# Patient Record
Sex: FEMALE | Race: AMERICAN INDIAN OR ALASKA NATIVE | NOT HISPANIC OR LATINO | Employment: OTHER | URBAN - METROPOLITAN AREA
[De-identification: names, ages, dates, MRNs, and addresses within clinical notes are randomized per-mention and may not be internally consistent; named-entity substitution may affect disease eponyms.]

---

## 2017-10-17 ENCOUNTER — HOSPITAL ENCOUNTER (OUTPATIENT)
Dept: RADIOLOGY | Facility: MEDICAL CENTER | Age: 72
End: 2017-10-17
Attending: INTERNAL MEDICINE
Payer: MEDICARE

## 2017-10-17 DIAGNOSIS — N18.30 CHRONIC KIDNEY DISEASE, STAGE III (MODERATE) (HCC): ICD-10-CM

## 2017-10-17 PROCEDURE — 76775 US EXAM ABDO BACK WALL LIM: CPT

## 2018-02-22 ENCOUNTER — HOSPITAL ENCOUNTER (OUTPATIENT)
Dept: RADIOLOGY | Facility: MEDICAL CENTER | Age: 73
End: 2018-02-22
Attending: FAMILY MEDICINE
Payer: MEDICARE

## 2018-02-22 DIAGNOSIS — M25.119: ICD-10-CM

## 2018-02-22 DIAGNOSIS — M54.2 CERVICALGIA: ICD-10-CM

## 2018-02-22 PROCEDURE — 73221 MRI JOINT UPR EXTREM W/O DYE: CPT | Mod: LT

## 2018-02-22 PROCEDURE — 72141 MRI NECK SPINE W/O DYE: CPT

## 2018-05-10 ENCOUNTER — HOSPITAL ENCOUNTER (OUTPATIENT)
Dept: RADIOLOGY | Facility: MEDICAL CENTER | Age: 73
End: 2018-05-10
Attending: FAMILY MEDICINE
Payer: MEDICARE

## 2018-05-10 DIAGNOSIS — R42 DIZZINESS AND GIDDINESS: ICD-10-CM

## 2018-05-10 PROCEDURE — 70551 MRI BRAIN STEM W/O DYE: CPT

## 2018-05-17 ENCOUNTER — PATIENT OUTREACH (OUTPATIENT)
Dept: HEALTH INFORMATION MANAGEMENT | Facility: OTHER | Age: 73
End: 2018-05-17

## 2018-05-17 NOTE — PROGRESS NOTES
Outcome: Patient has Non [facility] PCP    Please transfer to Patient Outreach Team at [phone] when patient returns call.    WebIZ Checked & Epic Updated:  yes    [facility] Verified: yes    Attempt # 1 / Final

## 2018-10-24 ENCOUNTER — PATIENT OUTREACH (OUTPATIENT)
Dept: HEALTH INFORMATION MANAGEMENT | Facility: OTHER | Age: 73
End: 2018-10-24

## 2018-11-07 ENCOUNTER — HOSPITAL ENCOUNTER (OUTPATIENT)
Dept: RADIOLOGY | Facility: MEDICAL CENTER | Age: 73
End: 2018-11-07
Payer: MEDICARE

## 2018-11-07 DIAGNOSIS — Z12.31 VISIT FOR SCREENING MAMMOGRAM: ICD-10-CM

## 2018-11-07 PROCEDURE — 77067 SCR MAMMO BI INCL CAD: CPT

## 2018-12-10 DIAGNOSIS — Z01.812 PRE-OPERATIVE LABORATORY EXAMINATION: ICD-10-CM

## 2018-12-10 DIAGNOSIS — Z01.810 PRE-OPERATIVE CARDIOVASCULAR EXAMINATION: ICD-10-CM

## 2018-12-10 LAB
ANION GAP SERPL CALC-SCNC: 6 MMOL/L (ref 0–11.9)
BUN SERPL-MCNC: 28 MG/DL (ref 8–22)
CALCIUM SERPL-MCNC: 9.4 MG/DL (ref 8.5–10.5)
CHLORIDE SERPL-SCNC: 109 MMOL/L (ref 96–112)
CO2 SERPL-SCNC: 24 MMOL/L (ref 20–33)
CREAT SERPL-MCNC: 1.67 MG/DL (ref 0.5–1.4)
EKG IMPRESSION: NORMAL
ERYTHROCYTE [DISTWIDTH] IN BLOOD BY AUTOMATED COUNT: 43.5 FL (ref 35.9–50)
GLUCOSE SERPL-MCNC: 81 MG/DL (ref 65–99)
HCT VFR BLD AUTO: 37.2 % (ref 37–47)
HGB BLD-MCNC: 12.5 G/DL (ref 12–16)
MCH RBC QN AUTO: 30.9 PG (ref 27–33)
MCHC RBC AUTO-ENTMCNC: 33.6 G/DL (ref 33.6–35)
MCV RBC AUTO: 91.9 FL (ref 81.4–97.8)
PLATELET # BLD AUTO: 160 K/UL (ref 164–446)
PMV BLD AUTO: 10.4 FL (ref 9–12.9)
POTASSIUM SERPL-SCNC: 4.8 MMOL/L (ref 3.6–5.5)
RBC # BLD AUTO: 4.05 M/UL (ref 4.2–5.4)
SODIUM SERPL-SCNC: 139 MMOL/L (ref 135–145)
WBC # BLD AUTO: 6.2 K/UL (ref 4.8–10.8)

## 2018-12-10 PROCEDURE — 80048 BASIC METABOLIC PNL TOTAL CA: CPT

## 2018-12-10 PROCEDURE — 36415 COLL VENOUS BLD VENIPUNCTURE: CPT

## 2018-12-10 PROCEDURE — 93010 ELECTROCARDIOGRAM REPORT: CPT | Performed by: INTERNAL MEDICINE

## 2018-12-10 PROCEDURE — 93005 ELECTROCARDIOGRAM TRACING: CPT

## 2018-12-10 PROCEDURE — 83036 HEMOGLOBIN GLYCOSYLATED A1C: CPT

## 2018-12-10 PROCEDURE — 85027 COMPLETE CBC AUTOMATED: CPT

## 2018-12-10 RX ORDER — FERROUS SULFATE 325(65) MG
162.5 TABLET ORAL DAILY
COMMUNITY

## 2018-12-10 RX ORDER — RANITIDINE 150 MG/1
150 TABLET ORAL 2 TIMES DAILY
COMMUNITY
End: 2022-09-09

## 2018-12-10 RX ORDER — LISINOPRIL 40 MG/1
40 TABLET ORAL EVERY MORNING
Status: ON HOLD | COMMUNITY
End: 2019-01-26

## 2018-12-10 RX ORDER — MECLIZINE HYDROCHLORIDE 25 MG/1
25 TABLET ORAL
COMMUNITY
End: 2019-04-10

## 2018-12-10 RX ORDER — GLIMEPIRIDE 2 MG/1
2 TABLET ORAL EVERY MORNING
Status: ON HOLD | COMMUNITY
End: 2019-01-26

## 2018-12-10 NOTE — PROGRESS NOTES
Outcome: not availabe    Please transfer to Patient Outreach Team at [phone] when patient returns call.    WebIZ Checked & Epic Updated:  no    [facility] Verified: yes    Attempt # 1

## 2018-12-11 ENCOUNTER — HOSPITAL ENCOUNTER (OUTPATIENT)
Facility: MEDICAL CENTER | Age: 73
End: 2018-12-11
Attending: OPHTHALMOLOGY | Admitting: OPHTHALMOLOGY
Payer: MEDICARE

## 2018-12-11 VITALS
TEMPERATURE: 97.2 F | HEIGHT: 65 IN | HEART RATE: 70 BPM | BODY MASS INDEX: 29.2 KG/M2 | SYSTOLIC BLOOD PRESSURE: 145 MMHG | RESPIRATION RATE: 16 BRPM | WEIGHT: 175.27 LBS | OXYGEN SATURATION: 94 % | DIASTOLIC BLOOD PRESSURE: 57 MMHG

## 2018-12-11 LAB
GLUCOSE BLD-MCNC: 105 MG/DL (ref 65–99)
GLUCOSE BLD-MCNC: 74 MG/DL (ref 65–99)
GLUCOSE BLD-MCNC: 85 MG/DL (ref 65–99)

## 2018-12-11 PROCEDURE — 160048 HCHG OR STATISTICAL LEVEL 1-5: Performed by: OPHTHALMOLOGY

## 2018-12-11 PROCEDURE — 501836 HCHG SUTURE EYE: Performed by: OPHTHALMOLOGY

## 2018-12-11 PROCEDURE — A6410 STERILE EYE PAD: HCPCS | Performed by: OPHTHALMOLOGY

## 2018-12-11 PROCEDURE — 700102 HCHG RX REV CODE 250 W/ 637 OVERRIDE(OP)

## 2018-12-11 PROCEDURE — 700111 HCHG RX REV CODE 636 W/ 250 OVERRIDE (IP)

## 2018-12-11 PROCEDURE — 160025 RECOVERY II MINUTES (STATS): Performed by: OPHTHALMOLOGY

## 2018-12-11 PROCEDURE — 500558 HCHG EYE SHIELD W/GARTER (FOX): Performed by: OPHTHALMOLOGY

## 2018-12-11 PROCEDURE — 700101 HCHG RX REV CODE 250

## 2018-12-11 PROCEDURE — A9270 NON-COVERED ITEM OR SERVICE: HCPCS

## 2018-12-11 PROCEDURE — 160035 HCHG PACU - 1ST 60 MINS PHASE I: Performed by: OPHTHALMOLOGY

## 2018-12-11 PROCEDURE — 160002 HCHG RECOVERY MINUTES (STAT): Performed by: OPHTHALMOLOGY

## 2018-12-11 PROCEDURE — 82962 GLUCOSE BLOOD TEST: CPT

## 2018-12-11 PROCEDURE — 160046 HCHG PACU - 1ST 60 MINS PHASE II: Performed by: OPHTHALMOLOGY

## 2018-12-11 PROCEDURE — 160029 HCHG SURGERY MINUTES - 1ST 30 MINS LEVEL 4: Performed by: OPHTHALMOLOGY

## 2018-12-11 PROCEDURE — 160009 HCHG ANES TIME/MIN: Performed by: OPHTHALMOLOGY

## 2018-12-11 PROCEDURE — 160041 HCHG SURGERY MINUTES - EA ADDL 1 MIN LEVEL 4: Performed by: OPHTHALMOLOGY

## 2018-12-11 RX ORDER — BALANCED SALT SOLUTION 6.4; .75; .48; .3; 3.9; 1.7 MG/ML; MG/ML; MG/ML; MG/ML; MG/ML; MG/ML
SOLUTION OPHTHALMIC
Status: DISCONTINUED | OUTPATIENT
Start: 2018-12-11 | End: 2018-12-11 | Stop reason: HOSPADM

## 2018-12-11 RX ORDER — ACETAMINOPHEN 325 MG/1
650 TABLET ORAL
Status: COMPLETED | OUTPATIENT
Start: 2018-12-11 | End: 2018-12-11

## 2018-12-11 RX ORDER — PHENYLEPHRINE HYDROCHLORIDE 25 MG/ML
SOLUTION/ DROPS OPHTHALMIC
Status: COMPLETED
Start: 2018-12-11 | End: 2018-12-11

## 2018-12-11 RX ORDER — PHENYLEPHRINE HYDROCHLORIDE 25 MG/ML
1 SOLUTION/ DROPS OPHTHALMIC
Status: COMPLETED | OUTPATIENT
Start: 2018-12-11 | End: 2018-12-11

## 2018-12-11 RX ORDER — BALANCED SALT SOLUTION ENRICHED WITH BICARBONATE, DEXTROSE, AND GLUTATHIONE
KIT INTRAOCULAR
Status: DISCONTINUED | OUTPATIENT
Start: 2018-12-11 | End: 2018-12-11 | Stop reason: HOSPADM

## 2018-12-11 RX ORDER — FLURBIPROFEN SODIUM 0.3 MG/ML
SOLUTION/ DROPS OPHTHALMIC
Status: COMPLETED
Start: 2018-12-11 | End: 2018-12-11

## 2018-12-11 RX ORDER — CYCLOPENTOLATE HYDROCHLORIDE 10 MG/ML
1 SOLUTION/ DROPS OPHTHALMIC ONCE
Status: COMPLETED | OUTPATIENT
Start: 2018-12-11 | End: 2018-12-11

## 2018-12-11 RX ORDER — ONDANSETRON 2 MG/ML
4 INJECTION INTRAMUSCULAR; INTRAVENOUS
Status: DISCONTINUED | OUTPATIENT
Start: 2018-12-11 | End: 2018-12-11 | Stop reason: HOSPADM

## 2018-12-11 RX ORDER — DEXTROSE MONOHYDRATE 50 MG/ML
INJECTION, SOLUTION INTRAVENOUS CONTINUOUS
Status: DISCONTINUED | OUTPATIENT
Start: 2018-12-11 | End: 2018-12-11 | Stop reason: HOSPADM

## 2018-12-11 RX ORDER — DEXTROSE MONOHYDRATE 50 MG/ML
1000 INJECTION, SOLUTION INTRAVENOUS
Status: COMPLETED | OUTPATIENT
Start: 2018-12-11 | End: 2018-12-11

## 2018-12-11 RX ORDER — MOXIFLOXACIN 5 MG/ML
SOLUTION/ DROPS OPHTHALMIC
Status: COMPLETED
Start: 2018-12-11 | End: 2018-12-11

## 2018-12-11 RX ORDER — SODIUM CHLORIDE, SODIUM LACTATE, POTASSIUM CHLORIDE, CALCIUM CHLORIDE 600; 310; 30; 20 MG/100ML; MG/100ML; MG/100ML; MG/100ML
INJECTION, SOLUTION INTRAVENOUS CONTINUOUS
Status: DISCONTINUED | OUTPATIENT
Start: 2018-12-11 | End: 2018-12-11 | Stop reason: HOSPADM

## 2018-12-11 RX ORDER — SODIUM CHLORIDE, SODIUM LACTATE, POTASSIUM CHLORIDE, CALCIUM CHLORIDE 600; 310; 30; 20 MG/100ML; MG/100ML; MG/100ML; MG/100ML
INJECTION, SOLUTION INTRAVENOUS ONCE
Status: COMPLETED | OUTPATIENT
Start: 2018-12-11 | End: 2018-12-11

## 2018-12-11 RX ORDER — DEXAMETHASONE SODIUM PHOSPHATE 4 MG/ML
INJECTION, SOLUTION INTRA-ARTICULAR; INTRALESIONAL; INTRAMUSCULAR; INTRAVENOUS; SOFT TISSUE
Status: DISCONTINUED
Start: 2018-12-11 | End: 2018-12-11 | Stop reason: HOSPADM

## 2018-12-11 RX ORDER — FLURBIPROFEN SODIUM 0.3 MG/ML
1 SOLUTION/ DROPS OPHTHALMIC ONCE
Status: COMPLETED | OUTPATIENT
Start: 2018-12-11 | End: 2018-12-11

## 2018-12-11 RX ORDER — MOXIFLOXACIN 5 MG/ML
1 SOLUTION/ DROPS OPHTHALMIC ONCE
Status: COMPLETED | OUTPATIENT
Start: 2018-12-11 | End: 2018-12-11

## 2018-12-11 RX ORDER — DEXAMETHASONE SODIUM PHOSPHATE 4 MG/ML
INJECTION, SOLUTION INTRA-ARTICULAR; INTRALESIONAL; INTRAMUSCULAR; INTRAVENOUS; SOFT TISSUE
Status: DISCONTINUED | OUTPATIENT
Start: 2018-12-11 | End: 2018-12-11 | Stop reason: HOSPADM

## 2018-12-11 RX ORDER — TROPICAMIDE 10 MG/ML
SOLUTION/ DROPS OPHTHALMIC
Status: COMPLETED
Start: 2018-12-11 | End: 2018-12-11

## 2018-12-11 RX ORDER — LABETALOL HYDROCHLORIDE 5 MG/ML
5 INJECTION, SOLUTION INTRAVENOUS
Status: DISCONTINUED | OUTPATIENT
Start: 2018-12-11 | End: 2018-12-11 | Stop reason: HOSPADM

## 2018-12-11 RX ORDER — BALANCED SALT SOLUTION ENRICHED WITH BICARBONATE, DEXTROSE, AND GLUTATHIONE
KIT INTRAOCULAR
Status: DISCONTINUED
Start: 2018-12-11 | End: 2018-12-11 | Stop reason: HOSPADM

## 2018-12-11 RX ORDER — TROPICAMIDE 10 MG/ML
1 SOLUTION/ DROPS OPHTHALMIC ONCE
Status: COMPLETED | OUTPATIENT
Start: 2018-12-11 | End: 2018-12-11

## 2018-12-11 RX ORDER — NEOMYCIN SULFATE, POLYMYXIN B SULFATE, AND DEXAMETHASONE 3.5; 10000; 1 MG/G; [USP'U]/G; MG/G
OINTMENT OPHTHALMIC
Status: DISCONTINUED | OUTPATIENT
Start: 2018-12-11 | End: 2018-12-11 | Stop reason: HOSPADM

## 2018-12-11 RX ORDER — CYCLOPENTOLATE HYDROCHLORIDE 10 MG/ML
SOLUTION/ DROPS OPHTHALMIC
Status: COMPLETED
Start: 2018-12-11 | End: 2018-12-11

## 2018-12-11 RX ORDER — DEXMEDETOMIDINE HYDROCHLORIDE 100 UG/ML
INJECTION, SOLUTION INTRAVENOUS
Status: DISCONTINUED
Start: 2018-12-11 | End: 2018-12-11 | Stop reason: HOSPADM

## 2018-12-11 RX ORDER — LIDOCAINE HYDROCHLORIDE 40 MG/ML
SOLUTION TOPICAL
Status: DISCONTINUED
Start: 2018-12-11 | End: 2018-12-11 | Stop reason: HOSPADM

## 2018-12-11 RX ADMIN — CYCLOPENTOLATE HYDROCHLORIDE 1 DROP: 10 SOLUTION/ DROPS OPHTHALMIC at 17:35

## 2018-12-11 RX ADMIN — CYCLOPENTOLATE HYDROCHLORIDE 1 DROP: 10 SOLUTION/ DROPS OPHTHALMIC at 11:30

## 2018-12-11 RX ADMIN — ACETAMINOPHEN 650 MG: 325 TABLET ORAL at 18:54

## 2018-12-11 RX ADMIN — TROPICAMIDE 1 DROP: 10 SOLUTION/ DROPS OPHTHALMIC at 17:36

## 2018-12-11 RX ADMIN — SODIUM CHLORIDE, SODIUM LACTATE, POTASSIUM CHLORIDE, CALCIUM CHLORIDE: 600; 310; 30; 20 INJECTION, SOLUTION INTRAVENOUS at 11:47

## 2018-12-11 RX ADMIN — DEXTROSE MONOHYDRATE 500 ML: 50 INJECTION, SOLUTION INTRAVENOUS at 17:35

## 2018-12-11 RX ADMIN — FLURBIPROFEN SODIUM 1 DROP: 0.3 SOLUTION/ DROPS OPHTHALMIC at 11:30

## 2018-12-11 RX ADMIN — FLURBIPROFEN SODIUM 1 DROP: 0.3 SOLUTION/ DROPS OPHTHALMIC at 17:36

## 2018-12-11 RX ADMIN — TROPICAMIDE 1 DROP: 10 SOLUTION/ DROPS OPHTHALMIC at 11:30

## 2018-12-11 RX ADMIN — MOXIFLOXACIN HYDROCHLORIDE 1 DROP: 5 SOLUTION/ DROPS OPHTHALMIC at 11:30

## 2018-12-11 RX ADMIN — PHENYLEPHRINE HYDROCHLORIDE 1 DROP: 25 SOLUTION/ DROPS OPHTHALMIC at 17:36

## 2018-12-11 RX ADMIN — MOXIFLOXACIN 1 DROP: 5 SOLUTION/ DROPS OPHTHALMIC at 17:36

## 2018-12-11 RX ADMIN — PHENYLEPHRINE HYDROCHLORIDE 1 DROP: 2.5 SOLUTION/ DROPS OPHTHALMIC at 11:30

## 2018-12-11 ASSESSMENT — PAIN SCALES - GENERAL
PAINLEVEL_OUTOF10: 0
PAINLEVEL_OUTOF10: 2
PAINLEVEL_OUTOF10: 0

## 2018-12-12 LAB
EST. AVERAGE GLUCOSE BLD GHB EST-MCNC: 200 MG/DL
HBA1C MFR BLD: 8.6 % (ref 0–5.6)

## 2018-12-12 NOTE — DISCHARGE INSTRUCTIONS
ACTIVITY: Rest and take it easy for the first 24 hours.  A responsible adult is recommended to remain with you during that time.  It is normal to feel sleepy.  We encourage you to not do anything that requires balance, judgment or coordination.    MILD FLU-LIKE SYMPTOMS ARE NORMAL. YOU MAY EXPERIENCE GENERALIZED MUSCLE ACHES, THROAT IRRITATION, HEADACHE AND/OR SOME NAUSEA.    FOR 24 HOURS DO NOT:  Drive, operate machinery or run household appliances.  Drink beer or alcoholic beverages.   Make important decisions or sign legal documents.    SPECIAL INSTRUCTIONS: Follow up with Dr. Bolaños tomorrow as scheduled. Call for worsening pain, fevers, discolored drainage.     DIET: To avoid nausea, slowly advance diet as tolerated, avoiding spicy or greasy foods for the first day.  Add more substantial food to your diet according to your physician's instructions.  Babies can be fed formula or breast milk as soon as they are hungry.  INCREASE FLUIDS AND FIBER TO AVOID CONSTIPATION.    SURGICAL DRESSING/BATHING: Ok to bathe, keep eye dressing clean and dry.    FOLLOW-UP APPOINTMENT:  A follow-up appointment should be arranged with your doctor tomorrow; call to schedule.    You should CALL YOUR PHYSICIAN if you develop:  Fever greater than 101 degrees F.  Pain not relieved by medication, or persistent nausea or vomiting.  Excessive bleeding (blood soaking through dressing) or unexpected drainage from the wound.  Extreme redness or swelling around the incision site, drainage of pus or foul smelling drainage.  Inability to urinate or empty your bladder within 8 hours.  Problems with breathing or chest pain.    You should call 911 if you develop problems with breathing or chest pain.  If you are unable to contact your doctor or surgical center, you should go to the nearest emergency room or urgent care center.  Physician's telephone #: Dr. Bolaños [phone]    If any questions arise, call your doctor.  If your doctor is not  available, please feel free to call the Surgical Center at [phone].  The Center is open Monday through Friday from 7AM to 7PM.  You can also call the HEALTH HOTLINE open 24 hours/day, 7 days/week and speak to a nurse at [phone], or toll free at [phone].    A registered nurse may call you a few days after your surgery to see how you are doing after your procedure.    MEDICATIONS: Resume taking daily medication.  Take prescribed pain medication with food.  If no medication is prescribed, you may take non-aspirin pain medication if needed.  PAIN MEDICATION CAN BE VERY CONSTIPATING.  Take a stool softener or laxative such as senokot, pericolace, or milk of magnesia if needed.     Last pain medication given at 6:55 pm (Tylenol).    If your physician has prescribed pain medication that includes Acetaminophen (Tylenol), do not take additional Acetaminophen (Tylenol) while taking the prescribed medication.    Depression / Suicide Risk    As you are discharged from this [facility] facility, it is important to learn how to keep safe from harming yourself.    Recognize the warning signs:  · Abrupt changes in personality, positive or negative- including increase in energy   · Giving away possessions  · Change in eating patterns- significant weight changes-  positive or negative  · Change in sleeping patterns- unable to sleep or sleeping all the time   · Unwillingness or inability to communicate  · Depression  · Unusual sadness, discouragement and loneliness  · Talk of wanting to die  · Neglect of personal appearance   · Rebelliousness- reckless behavior  · Withdrawal from people/activities they love  · Confusion- inability to concentrate     If you or a loved one observes any of these behaviors or has concerns about self-harm, here's what you can do:  · Talk about it- your feelings and reasons for harming yourself  · Remove any means that you might use to hurt yourself (examples: pills, rope, extension  cords, firearm)  · Get professional help from the community (Mental Health, Substance Abuse, psychological counseling)  · Do not be alone:Call your Safe Contact- someone whom you trust who will be there for you.  · Call your local CRISIS HOTLINE 601-7963 or 938-030-3738  · Call your local Children's Mobile Crisis Response Team [place] (053) 922-8122 or www.[employer]  · Call the toll free National Suicide Prevention Hotlines   · National Suicide Prevention Lifeline 296-070-JMHM (3778)  · National Hope Line Network 800-SUICIDE (880-7860)

## 2018-12-12 NOTE — OR NURSING
1830- Pt to PACU from OR. Report from anesthesia and OR RN. Eye shield in place, CDI. Respirations even and unlabored. VSS. FSBG 85, D5W infusing.

## 2018-12-12 NOTE — OR NURSING
1905- Medicated per MAR for 2/10 R eye pain. Sitting up in bed, tolerating orals, DC'd D5W per anesthesia order. Daughter at bs. No other needs at this time.     1945- Discharge orders received. Meets discharge criteria at this time. 2/10 surgical pain. No nausea. Tolerating PO. On RA. All lines and monitors discontinued. Reviewed discharge paperwork with pt and daugther. Discussed diet, activity, medications, follow up care and worsening symptoms. No questions at this time. Pt to be discharged to home via private vehicle. Escorted out to car in [place] with RN and daughter.

## 2018-12-12 NOTE — OR NURSING
1659 Pt's FSBG rechecked and 74. Dr. Carpenter notified. Order for Dextrose 5% at 30ml/ hr and started in preop, see MAR. Additional set of eye drops ordered by Surgeon, see MAR for administration. Report to MARINA Batres RN. Pt taken back to OR.

## 2018-12-17 NOTE — OP REPORT
DATE OF SERVICE:  12/11/2018    PREOPERATIVE DIAGNOSES:  1.  Nonclearing vitreous hemorrhage, right eye.  2.  Proliferative diabetic retinopathy, right eye.    POSTOPERATIVE DIAGNOSES:  1.  Nonclearing vitreous hemorrhage, right eye.  2.  Proliferative diabetic retinopathy, right eye.    PROCEDURE:  A 23-gauge pars plana vitrectomy, Endolaser, right eye.    SURGEON:  Katina Bolaños MD    ASSISTANT:  None.    INDICATIONS:  Patient with decreased vision due to vitreous hemorrhage.    Risks, benefits, and alternatives of surgery were discussed with the patient.    Patient consented for the procedure.    DETAILS OF THE PROCEDURE:  The correct eye was marked in the preop area.  The   patient was taken to the operating room.  General anesthesia was induced by   anesthesia.  Patient was prepped and draped in the usual sterile ophthalmic   fashion.  Site was marked 3 mm from the limbus in the inferotemporal quadrant.    A 23-gauge trocar was used in a beveled fashion to enter the vitreous   cavity.  Infusion was placed in the eye, visualized with the light pipe and   then turned on.  One site superonasally and another site superotemporally were   then marked 3 mm from the limbus.  A 23-gauge trocar was used in a beveled   fashion to enter the vitreous cavity.  Infusion was placed in the eye with a   light pipe and vitrector were inserted inside the eye.  We performed good core   and peripheral vitrectomy to remove the vitreous hemorrhage.  We noticed old   laser, but there were room for more laser, so we performed Endolaser.  Scleral   depression was then performed.  There were no iatrogenic breaks or tears.    The trocars were then removed.  Sclerotomy site was sutured with 7-0 Vicryl   suture.  Postop Ancef and dexamethasone were injected subconjunctivally.  The   drapes were then removed.  The patient's face was cleaned, ointment applied to   the eye.  Eye was patched and shielded.  The patient was taken to the    recovery room in good condition.  There were no complications.       ____________________________________     MD LONDON MARQUES / ADAM    DD:  12/16/2018 22:10:41  DT:  12/17/2018 00:50:42    D#:  [id number]  Job#:  [id number]

## 2019-01-02 ENCOUNTER — HOSPITAL ENCOUNTER (OUTPATIENT)
Facility: MEDICAL CENTER | Age: 74
End: 2019-01-02
Payer: MEDICARE

## 2019-01-02 PROCEDURE — 82274 ASSAY TEST FOR BLOOD FECAL: CPT

## 2019-01-09 ENCOUNTER — HOSPITAL ENCOUNTER (OUTPATIENT)
Dept: LAB | Facility: MEDICAL CENTER | Age: 74
End: 2019-01-09
Attending: PHYSICIAN ASSISTANT
Payer: MEDICARE

## 2019-01-09 PROCEDURE — 87186 SC STD MICRODIL/AGAR DIL: CPT

## 2019-01-09 PROCEDURE — 87077 CULTURE AEROBIC IDENTIFY: CPT

## 2019-01-09 PROCEDURE — 87086 URINE CULTURE/COLONY COUNT: CPT

## 2019-01-10 LAB
AMBIGUOUS DTTM AMBI4: NORMAL
SIGNIFICANT IND 70042: NORMAL
SITE SITE: NORMAL
SOURCE SOURCE: NORMAL

## 2019-01-11 LAB — HEMOCCULT STL QL IA: NEGATIVE

## 2019-01-12 LAB
BACTERIA UR CULT: ABNORMAL
BACTERIA UR CULT: ABNORMAL
SIGNIFICANT IND 70042: ABNORMAL
SITE SITE: ABNORMAL
SOURCE SOURCE: ABNORMAL

## 2019-01-22 ENCOUNTER — APPOINTMENT (OUTPATIENT)
Dept: RADIOLOGY | Facility: MEDICAL CENTER | Age: 74
DRG: 683 | End: 2019-01-22
Attending: EMERGENCY MEDICINE
Payer: MEDICARE

## 2019-01-22 ENCOUNTER — HOSPITAL ENCOUNTER (INPATIENT)
Facility: MEDICAL CENTER | Age: 74
LOS: 4 days | DRG: 683 | End: 2019-01-26
Attending: EMERGENCY MEDICINE | Admitting: INTERNAL MEDICINE
Payer: MEDICARE

## 2019-01-22 DIAGNOSIS — R10.9 FLANK PAIN: ICD-10-CM

## 2019-01-22 DIAGNOSIS — N17.9 AKI (ACUTE KIDNEY INJURY) (HCC): ICD-10-CM

## 2019-01-22 DIAGNOSIS — E87.5 HYPERKALEMIA: ICD-10-CM

## 2019-01-22 LAB
ALBUMIN SERPL BCP-MCNC: 4.1 G/DL (ref 3.2–4.9)
ALBUMIN/GLOB SERPL: 1.4 G/DL
ALP SERPL-CCNC: 93 U/L (ref 30–99)
ALT SERPL-CCNC: 9 U/L (ref 2–50)
ANION GAP SERPL CALC-SCNC: 6 MMOL/L (ref 0–11.9)
APPEARANCE UR: CLEAR
AST SERPL-CCNC: 24 U/L (ref 12–45)
BACTERIA #/AREA URNS HPF: NEGATIVE /HPF
BASOPHILS # BLD AUTO: 0.8 % (ref 0–1.8)
BASOPHILS # BLD: 0.05 K/UL (ref 0–0.12)
BILIRUB SERPL-MCNC: 0.3 MG/DL (ref 0.1–1.5)
BILIRUB UR QL STRIP.AUTO: NEGATIVE
BUN SERPL-MCNC: 30 MG/DL (ref 8–22)
CALCIUM SERPL-MCNC: 9.3 MG/DL (ref 8.5–10.5)
CHLORIDE SERPL-SCNC: 109 MMOL/L (ref 96–112)
CO2 SERPL-SCNC: 18 MMOL/L (ref 20–33)
COLOR UR: YELLOW
CREAT SERPL-MCNC: 2.09 MG/DL (ref 0.5–1.4)
EOSINOPHIL # BLD AUTO: 0.18 K/UL (ref 0–0.51)
EOSINOPHIL NFR BLD: 2.9 % (ref 0–6.9)
EPI CELLS #/AREA URNS HPF: NEGATIVE /HPF
ERYTHROCYTE [DISTWIDTH] IN BLOOD BY AUTOMATED COUNT: 43.8 FL (ref 35.9–50)
GLOBULIN SER CALC-MCNC: 2.9 G/DL (ref 1.9–3.5)
GLUCOSE SERPL-MCNC: 103 MG/DL (ref 65–99)
GLUCOSE UR STRIP.AUTO-MCNC: NEGATIVE MG/DL
HCT VFR BLD AUTO: 33.5 % (ref 37–47)
HGB BLD-MCNC: 11.2 G/DL (ref 12–16)
HYALINE CASTS #/AREA URNS LPF: ABNORMAL /LPF
IMM GRANULOCYTES # BLD AUTO: 0.01 K/UL (ref 0–0.11)
IMM GRANULOCYTES NFR BLD AUTO: 0.2 % (ref 0–0.9)
KETONES UR STRIP.AUTO-MCNC: NEGATIVE MG/DL
LEUKOCYTE ESTERASE UR QL STRIP.AUTO: ABNORMAL
LIPASE SERPL-CCNC: 120 U/L (ref 11–82)
LYMPHOCYTES # BLD AUTO: 2.11 K/UL (ref 1–4.8)
LYMPHOCYTES NFR BLD: 33.9 % (ref 22–41)
MCH RBC QN AUTO: 31.5 PG (ref 27–33)
MCHC RBC AUTO-ENTMCNC: 33.4 G/DL (ref 33.6–35)
MCV RBC AUTO: 94.1 FL (ref 81.4–97.8)
MICRO URNS: ABNORMAL
MONOCYTES # BLD AUTO: 0.46 K/UL (ref 0–0.85)
MONOCYTES NFR BLD AUTO: 7.4 % (ref 0–13.4)
NEUTROPHILS # BLD AUTO: 3.41 K/UL (ref 2–7.15)
NEUTROPHILS NFR BLD: 54.8 % (ref 44–72)
NITRITE UR QL STRIP.AUTO: NEGATIVE
NRBC # BLD AUTO: 0 K/UL
NRBC BLD-RTO: 0 /100 WBC
PH UR STRIP.AUTO: 5 [PH]
PLATELET # BLD AUTO: 163 K/UL (ref 164–446)
PMV BLD AUTO: 10.2 FL (ref 9–12.9)
POTASSIUM SERPL-SCNC: 6.1 MMOL/L (ref 3.6–5.5)
PROT SERPL-MCNC: 7 G/DL (ref 6–8.2)
PROT UR QL STRIP: NEGATIVE MG/DL
RBC # BLD AUTO: 3.56 M/UL (ref 4.2–5.4)
RBC # URNS HPF: ABNORMAL /HPF
RBC UR QL AUTO: NEGATIVE
SODIUM SERPL-SCNC: 133 MMOL/L (ref 135–145)
SP GR UR STRIP.AUTO: 1.01
UROBILINOGEN UR STRIP.AUTO-MCNC: 0.2 MG/DL
WBC # BLD AUTO: 6.2 K/UL (ref 4.8–10.8)
WBC #/AREA URNS HPF: ABNORMAL /HPF

## 2019-01-22 PROCEDURE — 83036 HEMOGLOBIN GLYCOSYLATED A1C: CPT

## 2019-01-22 PROCEDURE — 700111 HCHG RX REV CODE 636 W/ 250 OVERRIDE (IP): Performed by: EMERGENCY MEDICINE

## 2019-01-22 PROCEDURE — 93005 ELECTROCARDIOGRAM TRACING: CPT | Performed by: EMERGENCY MEDICINE

## 2019-01-22 PROCEDURE — 83690 ASSAY OF LIPASE: CPT

## 2019-01-22 PROCEDURE — 85025 COMPLETE CBC W/AUTO DIFF WBC: CPT

## 2019-01-22 PROCEDURE — 99223 1ST HOSP IP/OBS HIGH 75: CPT | Performed by: INTERNAL MEDICINE

## 2019-01-22 PROCEDURE — 80053 COMPREHEN METABOLIC PANEL: CPT

## 2019-01-22 PROCEDURE — 770020 HCHG ROOM/CARE - TELE (206)

## 2019-01-22 PROCEDURE — 74176 CT ABD & PELVIS W/O CONTRAST: CPT

## 2019-01-22 PROCEDURE — 700105 HCHG RX REV CODE 258: Performed by: EMERGENCY MEDICINE

## 2019-01-22 PROCEDURE — 96361 HYDRATE IV INFUSION ADD-ON: CPT

## 2019-01-22 PROCEDURE — 99285 EMERGENCY DEPT VISIT HI MDM: CPT

## 2019-01-22 PROCEDURE — 81001 URINALYSIS AUTO W/SCOPE: CPT

## 2019-01-22 PROCEDURE — 96375 TX/PRO/DX INJ NEW DRUG ADDON: CPT

## 2019-01-22 PROCEDURE — 36415 COLL VENOUS BLD VENIPUNCTURE: CPT

## 2019-01-22 RX ORDER — MORPHINE SULFATE 4 MG/ML
4 INJECTION, SOLUTION INTRAMUSCULAR; INTRAVENOUS ONCE
Status: COMPLETED | OUTPATIENT
Start: 2019-01-22 | End: 2019-01-22

## 2019-01-22 RX ORDER — CALCIUM CHLORIDE 100 MG/ML
1 INJECTION INTRAVENOUS; INTRAVENTRICULAR ONCE
Status: DISCONTINUED | OUTPATIENT
Start: 2019-01-23 | End: 2019-01-23

## 2019-01-22 RX ORDER — DEXTROSE MONOHYDRATE 25 G/50ML
25 INJECTION, SOLUTION INTRAVENOUS
Status: DISCONTINUED | OUTPATIENT
Start: 2019-01-22 | End: 2019-01-23

## 2019-01-22 RX ORDER — DEXTROSE MONOHYDRATE 25 G/50ML
25 INJECTION, SOLUTION INTRAVENOUS ONCE
Status: DISCONTINUED | OUTPATIENT
Start: 2019-01-23 | End: 2019-01-23

## 2019-01-22 RX ORDER — ONDANSETRON 2 MG/ML
4 INJECTION INTRAMUSCULAR; INTRAVENOUS ONCE
Status: COMPLETED | OUTPATIENT
Start: 2019-01-22 | End: 2019-01-22

## 2019-01-22 RX ORDER — ACETAMINOPHEN 325 MG/1
650 TABLET ORAL EVERY 6 HOURS PRN
Status: DISCONTINUED | OUTPATIENT
Start: 2019-01-22 | End: 2019-01-26 | Stop reason: HOSPADM

## 2019-01-22 RX ORDER — SODIUM POLYSTYRENE SULFONATE 15 G/60ML
30 SUSPENSION ORAL; RECTAL ONCE
Status: DISCONTINUED | OUTPATIENT
Start: 2019-01-23 | End: 2019-01-23

## 2019-01-22 RX ORDER — POLYETHYLENE GLYCOL 3350 17 G/17G
1 POWDER, FOR SOLUTION ORAL
Status: DISCONTINUED | OUTPATIENT
Start: 2019-01-22 | End: 2019-01-23

## 2019-01-22 RX ORDER — SODIUM CHLORIDE 9 MG/ML
INJECTION, SOLUTION INTRAVENOUS CONTINUOUS
Status: DISCONTINUED | OUTPATIENT
Start: 2019-01-23 | End: 2019-01-23

## 2019-01-22 RX ORDER — SODIUM CHLORIDE 9 MG/ML
1000 INJECTION, SOLUTION INTRAVENOUS ONCE
Status: COMPLETED | OUTPATIENT
Start: 2019-01-22 | End: 2019-01-23

## 2019-01-22 RX ORDER — BISACODYL 10 MG
10 SUPPOSITORY, RECTAL RECTAL
Status: DISCONTINUED | OUTPATIENT
Start: 2019-01-22 | End: 2019-01-23

## 2019-01-22 RX ORDER — AMOXICILLIN 250 MG
2 CAPSULE ORAL 2 TIMES DAILY
Status: DISCONTINUED | OUTPATIENT
Start: 2019-01-23 | End: 2019-01-23

## 2019-01-22 RX ADMIN — SODIUM CHLORIDE 1000 ML: 9 INJECTION, SOLUTION INTRAVENOUS at 19:39

## 2019-01-22 RX ADMIN — MORPHINE SULFATE 4 MG: 4 INJECTION INTRAVENOUS at 19:39

## 2019-01-22 RX ADMIN — ONDANSETRON 4 MG: 2 INJECTION INTRAMUSCULAR; INTRAVENOUS at 19:39

## 2019-01-22 ASSESSMENT — LIFESTYLE VARIABLES: DO YOU DRINK ALCOHOL: NO

## 2019-01-23 ENCOUNTER — APPOINTMENT (OUTPATIENT)
Dept: RADIOLOGY | Facility: MEDICAL CENTER | Age: 74
DRG: 683 | End: 2019-01-23
Attending: INTERNAL MEDICINE
Payer: MEDICARE

## 2019-01-23 PROBLEM — K83.1 PANCREATITIS DUE TO BILIARY OBSTRUCTION: Status: ACTIVE | Noted: 2019-01-23

## 2019-01-23 PROBLEM — K80.50 BILIARY COLIC: Status: ACTIVE | Noted: 2019-01-23

## 2019-01-23 PROBLEM — Z79.4 TYPE 2 DIABETES MELLITUS WITH HYPOGLYCEMIA, WITH LONG-TERM CURRENT USE OF INSULIN (HCC): Status: ACTIVE | Noted: 2019-01-23

## 2019-01-23 PROBLEM — N18.9 ACUTE ON CHRONIC KIDNEY FAILURE (HCC): Status: ACTIVE | Noted: 2019-01-23

## 2019-01-23 PROBLEM — E87.5 HYPERKALEMIA: Status: ACTIVE | Noted: 2019-01-23

## 2019-01-23 PROBLEM — R10.11 RUQ PAIN: Status: ACTIVE | Noted: 2019-01-23

## 2019-01-23 PROBLEM — I10 ESSENTIAL HYPERTENSION: Status: ACTIVE | Noted: 2019-01-23

## 2019-01-23 PROBLEM — N17.9 ACUTE ON CHRONIC KIDNEY FAILURE (HCC): Status: ACTIVE | Noted: 2019-01-23

## 2019-01-23 PROBLEM — K85.90 PANCREATITIS DUE TO BILIARY OBSTRUCTION: Status: ACTIVE | Noted: 2019-01-23

## 2019-01-23 PROBLEM — E87.1 HYPONATREMIA: Status: ACTIVE | Noted: 2019-01-23

## 2019-01-23 PROBLEM — E16.2 HYPOGLYCEMIA: Status: ACTIVE | Noted: 2019-01-23

## 2019-01-23 PROBLEM — E11.649 TYPE 2 DIABETES MELLITUS WITH HYPOGLYCEMIA, WITH LONG-TERM CURRENT USE OF INSULIN (HCC): Status: ACTIVE | Noted: 2019-01-23

## 2019-01-23 LAB
ANION GAP SERPL CALC-SCNC: 0 MMOL/L (ref 0–11.9)
ANION GAP SERPL CALC-SCNC: 2 MMOL/L (ref 0–11.9)
ANION GAP SERPL CALC-SCNC: 3 MMOL/L (ref 0–11.9)
ANION GAP SERPL CALC-SCNC: 4 MMOL/L (ref 0–11.9)
ANION GAP SERPL CALC-SCNC: 5 MMOL/L (ref 0–11.9)
ANION GAP SERPL CALC-SCNC: 6 MMOL/L (ref 0–11.9)
ANION GAP SERPL CALC-SCNC: 7 MMOL/L (ref 0–11.9)
APTT PPP: 35 SEC (ref 24.7–36)
BASOPHILS # BLD AUTO: 0.8 % (ref 0–1.8)
BASOPHILS # BLD: 0.04 K/UL (ref 0–0.12)
BUN SERPL-MCNC: 26 MG/DL (ref 8–22)
BUN SERPL-MCNC: 27 MG/DL (ref 8–22)
BUN SERPL-MCNC: 28 MG/DL (ref 8–22)
BUN SERPL-MCNC: 28 MG/DL (ref 8–22)
CALCIUM SERPL-MCNC: 10 MG/DL (ref 8.5–10.5)
CALCIUM SERPL-MCNC: 10 MG/DL (ref 8.5–10.5)
CALCIUM SERPL-MCNC: 8.8 MG/DL (ref 8.5–10.5)
CALCIUM SERPL-MCNC: 9.2 MG/DL (ref 8.5–10.5)
CALCIUM SERPL-MCNC: 9.3 MG/DL (ref 8.5–10.5)
CALCIUM SERPL-MCNC: 9.5 MG/DL (ref 8.5–10.5)
CALCIUM SERPL-MCNC: 9.7 MG/DL (ref 8.5–10.5)
CALCIUM SERPL-MCNC: 9.8 MG/DL (ref 8.5–10.5)
CALCIUM SERPL-MCNC: 9.9 MG/DL (ref 8.5–10.5)
CHLORIDE SERPL-SCNC: 100 MMOL/L (ref 96–112)
CHLORIDE SERPL-SCNC: 103 MMOL/L (ref 96–112)
CHLORIDE SERPL-SCNC: 105 MMOL/L (ref 96–112)
CHLORIDE SERPL-SCNC: 108 MMOL/L (ref 96–112)
CHLORIDE SERPL-SCNC: 109 MMOL/L (ref 96–112)
CHLORIDE SERPL-SCNC: 109 MMOL/L (ref 96–112)
CHLORIDE SERPL-SCNC: 97 MMOL/L (ref 96–112)
CHLORIDE SERPL-SCNC: 98 MMOL/L (ref 96–112)
CHLORIDE SERPL-SCNC: 98 MMOL/L (ref 96–112)
CO2 SERPL-SCNC: 21 MMOL/L (ref 20–33)
CO2 SERPL-SCNC: 22 MMOL/L (ref 20–33)
CO2 SERPL-SCNC: 23 MMOL/L (ref 20–33)
CO2 SERPL-SCNC: 23 MMOL/L (ref 20–33)
CO2 SERPL-SCNC: 24 MMOL/L (ref 20–33)
CO2 SERPL-SCNC: 25 MMOL/L (ref 20–33)
CO2 SERPL-SCNC: 27 MMOL/L (ref 20–33)
CORTIS SERPL-MCNC: 5.7 UG/DL (ref 0–23)
CREAT SERPL-MCNC: 1.91 MG/DL (ref 0.5–1.4)
CREAT SERPL-MCNC: 1.94 MG/DL (ref 0.5–1.4)
CREAT SERPL-MCNC: 1.95 MG/DL (ref 0.5–1.4)
CREAT SERPL-MCNC: 1.96 MG/DL (ref 0.5–1.4)
CREAT SERPL-MCNC: 2 MG/DL (ref 0.5–1.4)
CREAT SERPL-MCNC: 2.06 MG/DL (ref 0.5–1.4)
CREAT SERPL-MCNC: 2.06 MG/DL (ref 0.5–1.4)
CREAT SERPL-MCNC: 2.1 MG/DL (ref 0.5–1.4)
CREAT SERPL-MCNC: 2.1 MG/DL (ref 0.5–1.4)
EKG IMPRESSION: NORMAL
EOSINOPHIL # BLD AUTO: 0.14 K/UL (ref 0–0.51)
EOSINOPHIL NFR BLD: 2.8 % (ref 0–6.9)
ERYTHROCYTE [DISTWIDTH] IN BLOOD BY AUTOMATED COUNT: 45.3 FL (ref 35.9–50)
EST. AVERAGE GLUCOSE BLD GHB EST-MCNC: 192 MG/DL
GLUCOSE BLD-MCNC: 101 MG/DL (ref 65–99)
GLUCOSE BLD-MCNC: 106 MG/DL (ref 65–99)
GLUCOSE BLD-MCNC: 138 MG/DL (ref 65–99)
GLUCOSE BLD-MCNC: 211 MG/DL (ref 65–99)
GLUCOSE BLD-MCNC: 257 MG/DL (ref 65–99)
GLUCOSE BLD-MCNC: 340 MG/DL (ref 65–99)
GLUCOSE BLD-MCNC: 370 MG/DL (ref 65–99)
GLUCOSE BLD-MCNC: 46 MG/DL (ref 65–99)
GLUCOSE BLD-MCNC: 52 MG/DL (ref 65–99)
GLUCOSE BLD-MCNC: 76 MG/DL (ref 65–99)
GLUCOSE BLD-MCNC: 86 MG/DL (ref 65–99)
GLUCOSE SERPL-MCNC: 107 MG/DL (ref 65–99)
GLUCOSE SERPL-MCNC: 211 MG/DL (ref 65–99)
GLUCOSE SERPL-MCNC: 219 MG/DL (ref 65–99)
GLUCOSE SERPL-MCNC: 245 MG/DL (ref 65–99)
GLUCOSE SERPL-MCNC: 260 MG/DL (ref 65–99)
GLUCOSE SERPL-MCNC: 274 MG/DL (ref 65–99)
GLUCOSE SERPL-MCNC: 286 MG/DL (ref 65–99)
GLUCOSE SERPL-MCNC: 318 MG/DL (ref 65–99)
GLUCOSE SERPL-MCNC: 355 MG/DL (ref 65–99)
HBA1C MFR BLD: 8.3 % (ref 0–5.6)
HCT VFR BLD AUTO: 37.4 % (ref 37–47)
HGB BLD-MCNC: 11.9 G/DL (ref 12–16)
IMM GRANULOCYTES # BLD AUTO: 0.01 K/UL (ref 0–0.11)
IMM GRANULOCYTES NFR BLD AUTO: 0.2 % (ref 0–0.9)
INR PPP: 0.98 (ref 0.87–1.13)
LACTATE BLD-SCNC: 1.2 MMOL/L (ref 0.5–2)
LIPASE SERPL-CCNC: 56 U/L (ref 11–82)
LIPASE SERPL-CCNC: 65 U/L (ref 11–82)
LYMPHOCYTES # BLD AUTO: 1.26 K/UL (ref 1–4.8)
LYMPHOCYTES NFR BLD: 24.8 % (ref 22–41)
MAGNESIUM SERPL-MCNC: 1.5 MG/DL (ref 1.5–2.5)
MCH RBC QN AUTO: 30.6 PG (ref 27–33)
MCHC RBC AUTO-ENTMCNC: 31.8 G/DL (ref 33.6–35)
MCV RBC AUTO: 96.1 FL (ref 81.4–97.8)
MONOCYTES # BLD AUTO: 0.39 K/UL (ref 0–0.85)
MONOCYTES NFR BLD AUTO: 7.7 % (ref 0–13.4)
NEUTROPHILS # BLD AUTO: 3.24 K/UL (ref 2–7.15)
NEUTROPHILS NFR BLD: 63.7 % (ref 44–72)
NRBC # BLD AUTO: 0 K/UL
NRBC BLD-RTO: 0 /100 WBC
PHOSPHATE SERPL-MCNC: 4.3 MG/DL (ref 2.5–4.5)
PLATELET # BLD AUTO: 178 K/UL (ref 164–446)
PMV BLD AUTO: 10.1 FL (ref 9–12.9)
POTASSIUM SERPL-SCNC: 5.5 MMOL/L (ref 3.6–5.5)
POTASSIUM SERPL-SCNC: 5.7 MMOL/L (ref 3.6–5.5)
POTASSIUM SERPL-SCNC: 5.8 MMOL/L (ref 3.6–5.5)
POTASSIUM SERPL-SCNC: 6 MMOL/L (ref 3.6–5.5)
POTASSIUM SERPL-SCNC: 6.1 MMOL/L (ref 3.6–5.5)
POTASSIUM SERPL-SCNC: 6.7 MMOL/L (ref 3.6–5.5)
POTASSIUM SERPL-SCNC: 6.9 MMOL/L (ref 3.6–5.5)
PROTHROMBIN TIME: 13.1 SEC (ref 12–14.6)
RBC # BLD AUTO: 3.89 M/UL (ref 4.2–5.4)
SODIUM SERPL-SCNC: 127 MMOL/L (ref 135–145)
SODIUM SERPL-SCNC: 128 MMOL/L (ref 135–145)
SODIUM SERPL-SCNC: 129 MMOL/L (ref 135–145)
SODIUM SERPL-SCNC: 130 MMOL/L (ref 135–145)
SODIUM SERPL-SCNC: 131 MMOL/L (ref 135–145)
SODIUM SERPL-SCNC: 131 MMOL/L (ref 135–145)
SODIUM SERPL-SCNC: 133 MMOL/L (ref 135–145)
SODIUM SERPL-SCNC: 134 MMOL/L (ref 135–145)
SODIUM SERPL-SCNC: 138 MMOL/L (ref 135–145)
TSH SERPL DL<=0.005 MIU/L-ACNC: 9.19 UIU/ML (ref 0.38–5.33)
WBC # BLD AUTO: 5.1 K/UL (ref 4.8–10.8)

## 2019-01-23 PROCEDURE — 83735 ASSAY OF MAGNESIUM: CPT

## 2019-01-23 PROCEDURE — 36415 COLL VENOUS BLD VENIPUNCTURE: CPT

## 2019-01-23 PROCEDURE — 700101 HCHG RX REV CODE 250: Performed by: INTERNAL MEDICINE

## 2019-01-23 PROCEDURE — 85025 COMPLETE CBC W/AUTO DIFF WBC: CPT

## 2019-01-23 PROCEDURE — 770022 HCHG ROOM/CARE - ICU (200)

## 2019-01-23 PROCEDURE — 85730 THROMBOPLASTIN TIME PARTIAL: CPT

## 2019-01-23 PROCEDURE — 76705 ECHO EXAM OF ABDOMEN: CPT

## 2019-01-23 PROCEDURE — C1751 CATH, INF, PER/CENT/MIDLINE: HCPCS

## 2019-01-23 PROCEDURE — 93005 ELECTROCARDIOGRAM TRACING: CPT | Performed by: INTERNAL MEDICINE

## 2019-01-23 PROCEDURE — 82533 TOTAL CORTISOL: CPT

## 2019-01-23 PROCEDURE — 700105 HCHG RX REV CODE 258: Performed by: INTERNAL MEDICINE

## 2019-01-23 PROCEDURE — 700111 HCHG RX REV CODE 636 W/ 250 OVERRIDE (IP): Performed by: INTERNAL MEDICINE

## 2019-01-23 PROCEDURE — 74181 MRI ABDOMEN W/O CONTRAST: CPT

## 2019-01-23 PROCEDURE — 84100 ASSAY OF PHOSPHORUS: CPT

## 2019-01-23 PROCEDURE — 99291 CRITICAL CARE FIRST HOUR: CPT | Performed by: INTERNAL MEDICINE

## 2019-01-23 PROCEDURE — C9113 INJ PANTOPRAZOLE SODIUM, VIA: HCPCS | Performed by: INTERNAL MEDICINE

## 2019-01-23 PROCEDURE — 700102 HCHG RX REV CODE 250 W/ 637 OVERRIDE(OP): Performed by: INTERNAL MEDICINE

## 2019-01-23 PROCEDURE — 80048 BASIC METABOLIC PNL TOTAL CA: CPT | Mod: 91

## 2019-01-23 PROCEDURE — 71045 X-RAY EXAM CHEST 1 VIEW: CPT

## 2019-01-23 PROCEDURE — 83690 ASSAY OF LIPASE: CPT

## 2019-01-23 PROCEDURE — 83605 ASSAY OF LACTIC ACID: CPT

## 2019-01-23 PROCEDURE — A9270 NON-COVERED ITEM OR SERVICE: HCPCS | Performed by: INTERNAL MEDICINE

## 2019-01-23 PROCEDURE — 82962 GLUCOSE BLOOD TEST: CPT | Mod: 91

## 2019-01-23 PROCEDURE — 84443 ASSAY THYROID STIM HORMONE: CPT

## 2019-01-23 PROCEDURE — 93010 ELECTROCARDIOGRAM REPORT: CPT | Performed by: INTERNAL MEDICINE

## 2019-01-23 PROCEDURE — 85610 PROTHROMBIN TIME: CPT

## 2019-01-23 PROCEDURE — 96365 THER/PROPH/DIAG IV INF INIT: CPT

## 2019-01-23 PROCEDURE — 99223 1ST HOSP IP/OBS HIGH 75: CPT | Performed by: INTERNAL MEDICINE

## 2019-01-23 RX ORDER — HEPARIN SODIUM 5000 [USP'U]/ML
5000 INJECTION, SOLUTION INTRAVENOUS; SUBCUTANEOUS EVERY 8 HOURS
Status: DISCONTINUED | OUTPATIENT
Start: 2019-01-23 | End: 2019-01-26 | Stop reason: HOSPADM

## 2019-01-23 RX ORDER — GABAPENTIN 300 MG/1
300 CAPSULE ORAL 3 TIMES DAILY
Status: DISCONTINUED | OUTPATIENT
Start: 2019-01-23 | End: 2019-01-26 | Stop reason: HOSPADM

## 2019-01-23 RX ORDER — OXYCODONE HYDROCHLORIDE 5 MG/1
5 TABLET ORAL EVERY 4 HOURS PRN
Status: DISCONTINUED | OUTPATIENT
Start: 2019-01-23 | End: 2019-01-23

## 2019-01-23 RX ORDER — DEXTROSE MONOHYDRATE 25 G/50ML
25 INJECTION, SOLUTION INTRAVENOUS ONCE
Status: COMPLETED | OUTPATIENT
Start: 2019-01-23 | End: 2019-01-23

## 2019-01-23 RX ORDER — MECLIZINE HYDROCHLORIDE 25 MG/1
25 TABLET ORAL 3 TIMES DAILY PRN
Status: DISCONTINUED | OUTPATIENT
Start: 2019-01-23 | End: 2019-01-26 | Stop reason: HOSPADM

## 2019-01-23 RX ORDER — HYDRALAZINE HYDROCHLORIDE 20 MG/ML
10 INJECTION INTRAMUSCULAR; INTRAVENOUS EVERY 6 HOURS PRN
Status: DISCONTINUED | OUTPATIENT
Start: 2019-01-23 | End: 2019-01-24

## 2019-01-23 RX ORDER — MAGNESIUM SULFATE HEPTAHYDRATE 40 MG/ML
4 INJECTION, SOLUTION INTRAVENOUS ONCE
Status: COMPLETED | OUTPATIENT
Start: 2019-01-23 | End: 2019-01-23

## 2019-01-23 RX ORDER — OMEPRAZOLE 20 MG/1
20 CAPSULE, DELAYED RELEASE ORAL EVERY MORNING
Status: DISCONTINUED | OUTPATIENT
Start: 2019-01-23 | End: 2019-01-23

## 2019-01-23 RX ORDER — FUROSEMIDE 10 MG/ML
40 INJECTION INTRAMUSCULAR; INTRAVENOUS ONCE
Status: COMPLETED | OUTPATIENT
Start: 2019-01-23 | End: 2019-01-23

## 2019-01-23 RX ORDER — FERROUS SULFATE 325(65) MG
325 TABLET ORAL 2 TIMES DAILY
Status: DISCONTINUED | OUTPATIENT
Start: 2019-01-23 | End: 2019-01-23

## 2019-01-23 RX ORDER — PANTOPRAZOLE SODIUM 40 MG/10ML
40 INJECTION, POWDER, LYOPHILIZED, FOR SOLUTION INTRAVENOUS 2 TIMES DAILY
Status: DISCONTINUED | OUTPATIENT
Start: 2019-01-23 | End: 2019-01-24

## 2019-01-23 RX ORDER — LATANOPROST 50 UG/ML
1 SOLUTION/ DROPS OPHTHALMIC NIGHTLY
Status: DISCONTINUED | OUTPATIENT
Start: 2019-01-23 | End: 2019-01-26 | Stop reason: HOSPADM

## 2019-01-23 RX ORDER — DEXTROSE MONOHYDRATE 25 G/50ML
25 INJECTION, SOLUTION INTRAVENOUS
Status: DISCONTINUED | OUTPATIENT
Start: 2019-01-23 | End: 2019-01-25

## 2019-01-23 RX ORDER — DEXTROSE AND SODIUM CHLORIDE 5; .9 G/100ML; G/100ML
INJECTION, SOLUTION INTRAVENOUS CONTINUOUS
Status: DISCONTINUED | OUTPATIENT
Start: 2019-01-23 | End: 2019-01-23

## 2019-01-23 RX ADMIN — GABAPENTIN 300 MG: 300 CAPSULE ORAL at 11:40

## 2019-01-23 RX ADMIN — GABAPENTIN 300 MG: 300 CAPSULE ORAL at 06:02

## 2019-01-23 RX ADMIN — CALCIUM CHLORIDE 1 G: 100 INJECTION, SOLUTION INTRAVENOUS at 01:41

## 2019-01-23 RX ADMIN — DEXTROSE MONOHYDRATE 50 ML: 500 INJECTION PARENTERAL at 07:45

## 2019-01-23 RX ADMIN — OMEPRAZOLE 20 MG: 20 CAPSULE, DELAYED RELEASE ORAL at 06:02

## 2019-01-23 RX ADMIN — HEPARIN SODIUM 5000 UNITS: 5000 INJECTION, SOLUTION INTRAVENOUS; SUBCUTANEOUS at 06:02

## 2019-01-23 RX ADMIN — HYDRALAZINE HYDROCHLORIDE 10 MG: 20 INJECTION INTRAMUSCULAR; INTRAVENOUS at 14:58

## 2019-01-23 RX ADMIN — PANTOPRAZOLE SODIUM 40 MG: 40 INJECTION, POWDER, LYOPHILIZED, FOR SOLUTION INTRAVENOUS at 18:00

## 2019-01-23 RX ADMIN — SODIUM CHLORIDE: 9 INJECTION, SOLUTION INTRAVENOUS at 04:17

## 2019-01-23 RX ADMIN — PANTOPRAZOLE SODIUM 40 MG: 40 INJECTION, POWDER, LYOPHILIZED, FOR SOLUTION INTRAVENOUS at 09:13

## 2019-01-23 RX ADMIN — CALCIUM GLUCONATE 1 G: 98 INJECTION, SOLUTION INTRAVENOUS at 08:09

## 2019-01-23 RX ADMIN — INSULIN HUMAN 10 UNITS: 100 INJECTION, SOLUTION PARENTERAL at 09:05

## 2019-01-23 RX ADMIN — SODIUM ACETATE: 3.28 INJECTION, SOLUTION, CONCENTRATE INTRAVENOUS at 19:30

## 2019-01-23 RX ADMIN — HEPARIN SODIUM 5000 UNITS: 5000 INJECTION, SOLUTION INTRAVENOUS; SUBCUTANEOUS at 21:43

## 2019-01-23 RX ADMIN — PATIROMER 8.4 G: 8.4 POWDER, FOR SUSPENSION ORAL at 09:13

## 2019-01-23 RX ADMIN — DEXTROSE MONOHYDRATE 25 ML: 500 INJECTION PARENTERAL at 11:44

## 2019-01-23 RX ADMIN — Medication 2 TABLET: at 06:02

## 2019-01-23 RX ADMIN — Medication 325 MG: at 06:02

## 2019-01-23 RX ADMIN — HEPARIN SODIUM 5000 UNITS: 5000 INJECTION, SOLUTION INTRAVENOUS; SUBCUTANEOUS at 14:53

## 2019-01-23 RX ADMIN — SODIUM ACETATE: 3.28 INJECTION, SOLUTION, CONCENTRATE INTRAVENOUS at 08:28

## 2019-01-23 RX ADMIN — FUROSEMIDE 40 MG: 10 INJECTION, SOLUTION INTRAMUSCULAR; INTRAVENOUS at 06:02

## 2019-01-23 RX ADMIN — LATANOPROST 1 DROP: 50 SOLUTION OPHTHALMIC at 21:44

## 2019-01-23 RX ADMIN — OXYCODONE HYDROCHLORIDE 5 MG: 5 TABLET ORAL at 06:02

## 2019-01-23 RX ADMIN — GABAPENTIN 300 MG: 300 CAPSULE ORAL at 17:22

## 2019-01-23 RX ADMIN — FUROSEMIDE 40 MG: 10 INJECTION, SOLUTION INTRAMUSCULAR; INTRAVENOUS at 07:29

## 2019-01-23 RX ADMIN — MAGNESIUM SULFATE IN WATER 4 G: 40 INJECTION, SOLUTION INTRAVENOUS at 10:29

## 2019-01-23 ASSESSMENT — COGNITIVE AND FUNCTIONAL STATUS - GENERAL
MOBILITY SCORE: 24
SUGGESTED CMS G CODE MODIFIER DAILY ACTIVITY: CH
DAILY ACTIVITIY SCORE: 24
SUGGESTED CMS G CODE MODIFIER MOBILITY: CH

## 2019-01-23 ASSESSMENT — ENCOUNTER SYMPTOMS
FLANK PAIN: 1
BACK PAIN: 1
DEPRESSION: 0
FEVER: 0
CHEST TIGHTNESS: 0
APPETITE CHANGE: 0
MEMORY LOSS: 0
FALLS: 0
PALPITATIONS: 0
SEIZURES: 0
DIZZINESS: 0
NAUSEA: 1
HEADACHES: 0
NUMBNESS: 0
COUGH: 0
DIAPHORESIS: 0
ABDOMINAL DISTENTION: 1
BRUISES/BLEEDS EASILY: 0
INSOMNIA: 0
HALLUCINATIONS: 0
HEMOPTYSIS: 0
WHEEZING: 0
DIAPHORESIS: 1
VOMITING: 0
WEAKNESS: 1
CONFUSION: 0
SPUTUM PRODUCTION: 0
BLURRED VISION: 0
PND: 0
DIZZINESS: 1
ABDOMINAL PAIN: 0
HEARTBURN: 0
BACK PAIN: 0
NAUSEA: 0
MYALGIAS: 0
NERVOUS/ANXIOUS: 0
CLAUDICATION: 0
FOCAL WEAKNESS: 0
ORTHOPNEA: 0
FLANK PAIN: 0
NECK PAIN: 0
STRIDOR: 0
SINUS PAIN: 0
FATIGUE: 0
ABDOMINAL PAIN: 1
DIARRHEA: 1
ANAL BLEEDING: 0
CHILLS: 0
SORE THROAT: 0
SHORTNESS OF BREATH: 0
BLOOD IN STOOL: 0
CONSTIPATION: 1
DIARRHEA: 0
DOUBLE VISION: 0

## 2019-01-23 ASSESSMENT — PATIENT HEALTH QUESTIONNAIRE - PHQ9
SUM OF ALL RESPONSES TO PHQ9 QUESTIONS 1 AND 2: 0
2. FEELING DOWN, DEPRESSED, IRRITABLE, OR HOPELESS: NOT AT ALL
1. LITTLE INTEREST OR PLEASURE IN DOING THINGS: NOT AT ALL

## 2019-01-23 ASSESSMENT — LIFESTYLE VARIABLES
EVER_SMOKED: NEVER
ALCOHOL_USE: NO
EVER_SMOKED: NEVER
SUBSTANCE_ABUSE: 0

## 2019-01-23 ASSESSMENT — COPD QUESTIONNAIRES
DO YOU EVER COUGH UP ANY MUCUS OR PHLEGM?: NO/ONLY WITH OCCASIONAL COLDS OR INFECTIONS
DURING THE PAST 4 WEEKS HOW MUCH DID YOU FEEL SHORT OF BREATH: NONE/LITTLE OF THE TIME
HAVE YOU SMOKED AT LEAST 100 CIGARETTES IN YOUR ENTIRE LIFE: NO/DON'T KNOW
COPD SCREENING SCORE: 2

## 2019-01-23 NOTE — ED PROVIDER NOTES
ED Provider Note    ED Provider Note      Primary care provider: Pcp Not In Computer    CHIEF COMPLAINT  Chief Complaint   Patient presents with   • Flank Pain       HPI  Pam Hernandez is a 73 y.o. female who presents to the Emergency Department with chief complaint of right flank and pelvic pain.  Patient reports intermittent abdominal pain over the last few days saw primary care physician was put on antibiotics for urinary tract infection.  She is had no relief she reports the pain is now constant 8 out of 10 no alleviating or aggravating factors in the right flank with radiation in the right side of her pelvis.  Patient also reports moderate constipation she is had no dysuria she does have urinary hesitancy and urgency she is had no noted hematuria no melena no hematochezia no nausea no no cough congestion chest pain or shortness of breath.    REVIEW OF SYSTEMS  10 systems reviewed and otherwise negative, pertinent positives and negatives listed in the history of present illness.    PAST MEDICAL HISTORY   has a past medical history of Acid reflux; Anemia; Anesthesia; Arthritis; At risk for falling; Cataract; Dental disorder; Diabetes; Glaucoma; Hypertension; Hypothyroid (12/10/2018); Neuropathy (HCC); Pneumonia; Snoring; Urinary frequency; and Urinary incontinence.    SURGICAL HISTORY   has a past surgical history that includes us-needle core bx-breast panel; cataract extraction with iol (Bilateral); eye surgery (Left, 2018); and vitrectomy posterior (Right, 12/11/2018).    SOCIAL HISTORY  Social History   Substance Use Topics   • Smoking status: Never Smoker   • Smokeless tobacco: Never Used   • Alcohol use No      History   Drug Use No       FAMILY HISTORY  Non-Contributory    CURRENT MEDICATIONS  Home Medications    **Home medications have not yet been reviewed for this encounter**         ALLERGIES  No Known Allergies    PHYSICAL EXAM  VITAL SIGNS: BP (!) 168/76   Pulse 75   Temp 35.9 °C (96.6 °F)  (Temporal)   Resp 16   Wt 79.8 kg (175 lb 14.8 oz)   LMP  (LMP Unknown)   SpO2 97%   BMI 29.28 kg/m²   Pulse ox interpretation: I interpret this pulse ox as normal.  Constitutional: Alert and oriented x 3, minimal distress  HEENT: Atraumatic normocephalic, pupils are equal round reactive to light extraocular movements are intact. The nares is clear, external ears are normal, mouth shows moist mucous membranes  Neck: Supple, no JVD no tracheal deviation  Cardiovascular: Regular rate and rhythm no murmur rub or gallop 2+ pulses peripherally x4  Thorax & Lungs: No respiratory distress, no wheezes rales or rhonchi, No chest tenderness.   GI: Minimal right-sided flank tenderness with radiation of the right side pelvis no rebound no guarding positive bowel sounds nondistended  Skin: Warm dry no acute rash or lesion  Musculoskeletal: Moving all extremities with full range and 5 of 5 strength, no acute  deformity  Neurologic: Cranial nerves III through XII are grossly intact, no sensory deficit, no cerebellar dysfunction   Psychiatric: Appropriate affect for situation at this time      DIAGNOSTIC STUDIES / PROCEDURES  LABS      Results for orders placed or performed during the hospital encounter of 01/22/19   COMP METABOLIC PANEL   Result Value Ref Range    Sodium 133 (L) 135 - 145 mmol/L    Potassium 6.1 (H) 3.6 - 5.5 mmol/L    Chloride 109 96 - 112 mmol/L    Co2 18 (L) 20 - 33 mmol/L    Anion Gap 6.0 0.0 - 11.9    Glucose 103 (H) 65 - 99 mg/dL    Bun 30 (H) 8 - 22 mg/dL    Creatinine 2.09 (H) 0.50 - 1.40 mg/dL    Calcium 9.3 8.5 - 10.5 mg/dL    AST(SGOT) 24 12 - 45 U/L    ALT(SGPT) 9 2 - 50 U/L    Alkaline Phosphatase 93 30 - 99 U/L    Total Bilirubin 0.3 0.1 - 1.5 mg/dL    Albumin 4.1 3.2 - 4.9 g/dL    Total Protein 7.0 6.0 - 8.2 g/dL    Globulin 2.9 1.9 - 3.5 g/dL    A-G Ratio 1.4 g/dL   LIPASE   Result Value Ref Range    Lipase 120 (H) 11 - 82 U/L   URINALYSIS,CULTURE IF INDICATED   Result Value Ref Range    Color  Yellow     Character Clear     Specific Gravity 1.015 <1.035    Ph 5.0 5.0 - 8.0    Glucose Negative Negative mg/dL    Ketones Negative Negative mg/dL    Protein Negative Negative mg/dL    Bilirubin Negative Negative    Urobilinogen, Urine 0.2 Negative    Nitrite Negative Negative    Leukocyte Esterase Trace (A) Negative    Occult Blood Negative Negative    Micro Urine Req Microscopic    URINE MICROSCOPIC (W/UA)   Result Value Ref Range    WBC 5-10 (A) /hpf    RBC 2-5 (A) /hpf    Bacteria Negative None /hpf    Epithelial Cells Negative /hpf    Hyaline Cast 3-5 (A) /lpf   CBC WITH DIFFERENTIAL   Result Value Ref Range    WBC 6.2 4.8 - 10.8 K/uL    RBC 3.56 (L) 4.20 - 5.40 M/uL    Hemoglobin 11.2 (L) 12.0 - 16.0 g/dL    Hematocrit 33.5 (L) 37.0 - 47.0 %    MCV 94.1 81.4 - 97.8 fL    MCH 31.5 27.0 - 33.0 pg    MCHC 33.4 (L) 33.6 - 35.0 g/dL    RDW 43.8 35.9 - 50.0 fL    Platelet Count 163 (L) 164 - 446 K/uL    MPV 10.2 9.0 - 12.9 fL    Neutrophils-Polys 54.80 44.00 - 72.00 %    Lymphocytes 33.90 22.00 - 41.00 %    Monocytes 7.40 0.00 - 13.40 %    Eosinophils 2.90 0.00 - 6.90 %    Basophils 0.80 0.00 - 1.80 %    Immature Granulocytes 0.20 0.00 - 0.90 %    Nucleated RBC 0.00 /100 WBC    Neutrophils (Absolute) 3.41 2.00 - 7.15 K/uL    Lymphs (Absolute) 2.11 1.00 - 4.80 K/uL    Monos (Absolute) 0.46 0.00 - 0.85 K/uL    Eos (Absolute) 0.18 0.00 - 0.51 K/uL    Baso (Absolute) 0.05 0.00 - 0.12 K/uL    Immature Granulocytes (abs) 0.01 0.00 - 0.11 K/uL    NRBC (Absolute) 0.00 K/uL   ESTIMATED GFR   Result Value Ref Range    GFR If  28 (A) >60 mL/min/1.73 m 2    GFR If Non African American 23 (A) >60 mL/min/1.73 m 2       All labs reviewed by me.      RADIOLOGY  CT-RENAL COLIC EVALUATION(A/P W/O)   Final Result      1.  No renal stone or hydronephrosis.   2.  Normal appendix.   3.  Cholelithiasis.   4.  Mildly prominent aortocaval lymph node in the retroperitoneum, nonspecific and new from prior exam.  Neoplasm  is not excluded.        The radiologist's interpretation of all radiological studies have been reviewed by me.    COURSE & MEDICAL DECISION MAKING  Pertinent Labs & Imaging studies reviewed. (See chart for details)    6:37 PM - Patient seen and examined at bedside.           Patient noted to have slightly elevated blood pressure likely circumstantial secondary to presenting complaint. Referred to primary care physician for further evaluation.     Patient was given IV fluids based on hyperkalemia and acute renal insufficiency, oral hydration was not attempted due to inability to tolerate p.o. and insufficiency for hydration, after fluids had improvement of symptoms    Medical Decision Makin-year-old female presents with right-sided flank right-sided pelvic pain labs as above demonstrate some hyperkalemia acute renal insufficiency slight elevation in lipase.  CT of the abdomen pelvis without contrast demonstrates cholelithiasis and slight increased prominent aortocaval lymph node otherwise no acute focal abnormalities.  Patient's exam does not raise concern for pancreatitis however there is concern for gallstone pancreatitis with cholelithiasis on CAT scan elevated lipase patient was given 1 L of NS due to the renal insufficiency hyperkalemia she is had improvement of symptoms at this point should be admitted to the hospitalist service for further evaluation and treatment.  I discussed case Dr. Hawkins his help with this patient's greatly appreciate admitted in guarded condition.    BP (!) 168/76   Pulse 75   Temp 35.9 °C (96.6 °F) (Temporal)   Resp 16   Wt 79.8 kg (175 lb 14.8 oz)   LMP  (LMP Unknown)   SpO2 97%   BMI 29.28 kg/m²             FINAL IMPRESSION  1. Flank pain Active   2. Hyperkalemia Active   3. PRAMOD (acute kidney injury) (HCC) Active   4.  Elevated lipase  5.  Possibility of gallstone pancreatitis      This dictation has been created using voice recognition software and/or scribes. The  accuracy of the dictation is limited by the abilities of the software and the expertise of the scribes. I expect there may be some errors of grammar and possibly content. I made every attempt to manually correct the errors within my dictation. However, errors related to voice recognition software and/or scribes may still exist and should be interpreted within the appropriate context.

## 2019-01-23 NOTE — PROGRESS NOTES
2 RN Skin assessment     - No areas of pressure concern, appropriate prophylactic interventions in place

## 2019-01-23 NOTE — CARE PLAN
Problem: Communication  Goal: The ability to communicate needs accurately and effectively will improve  Outcome: PROGRESSING AS EXPECTED  Pt is able to communicate effectively; Rn is there to answer any questions or conserns    Problem: Safety  Goal: Will remain free from injury  Outcome: PROGRESSING AS EXPECTED  Pt safety precautions in place; bed in lowest lock position, 2 side rails up, non slip socks on, call light within reach- educated on when and how to use call light.

## 2019-01-23 NOTE — ED NOTES
Pt is currently taking an unkown antibiotic. Prescribed by nephrologistElieser and Urologist Shaye.

## 2019-01-23 NOTE — PROGRESS NOTES
Bedside report received from night nurse. Assumed care of pt. Pt awake, laying in bed. A/Ox4, VSS. No complaints at this time. POC reviewed and white board updated. Tele box on. Call light in reach. Bed locked in lowest position with 2 upper bed rails up. Bed alarm on. NS infusion running at 125 ml/hour.

## 2019-01-23 NOTE — ASSESSMENT & PLAN NOTE
Improved/resolved  ?constipation, ?passed gallstone  Monitor for recurrence of symptoms  Constipation relief

## 2019-01-23 NOTE — ED TRIAGE NOTES
"Pt ambulatory to triage c/o right sided flank pain. Pt states \"my doctor thinks its a kidney stone\" ..Pt given urine specimen cup and instructed on clean catch technique pt verbalizes understanding. Shown restroom.   "

## 2019-01-23 NOTE — PROGRESS NOTES
Patient transported to MRI via wheelchair with ACLS RN and transport tech, on transport monitor. VSS.

## 2019-01-23 NOTE — PROGRESS NOTES
Lab called with critical result of K 6.7 at 0810. Critical lab result read back to .   This critical lab result is within parameters established by  for this patient

## 2019-01-23 NOTE — PROGRESS NOTES
Lab called with critical result of potassium at 6.9. Critical lab result read back to lab.   Dr. Garcia notified of critical lab result at 0715.  Critical lab result read back by Dr. Garcia.

## 2019-01-23 NOTE — CONSULTS
Critical Care Consultation    Date of consult: 1/23/2019    Referring Physician  Olegario Garcia M.D.    Reason for Consultation  Hyperkalemia    History of Presenting Illness  73 y.o. female who presented 1/22/2019 with CKD, DM, HTN takes lisinopril that presented with one day of right flank pain worse with movement and bending that was admitted yesterday for hyperkalemia and cholethiasis seen on CT abd and RUQ US. She mac fever, chills, or appetite changes or pain worsening with food. She describes some distention, and constipation she had diarrhea so she took imodium now feels constipated. She describes no changes in urine output of change of oral intake. She does describe taking Motrin ? But seems a bit confused of this matter. She was started on Na Acetate gtt and given calcium, insulin and glucose, and IV lasix a total of 80 mg, no charting on urine output recorded but she describes she has been voiding. Mac chest pain, cough or shortness of breath.     Code Status  Full Code    Review of Systems  Review of Systems   Constitutional: Negative for appetite change, chills, fatigue and fever.   Respiratory: Negative for chest tightness and shortness of breath.    Cardiovascular: Negative for chest pain.   Gastrointestinal: Positive for abdominal distention, constipation and diarrhea. Negative for abdominal pain, anal bleeding, blood in stool, nausea and vomiting.   Genitourinary: Positive for flank pain. Negative for difficulty urinating and dysuria.   Musculoskeletal: Positive for back pain.   Neurological: Positive for dizziness. Negative for numbness and headaches.   Psychiatric/Behavioral: Negative for confusion.   All other systems reviewed and are negative.      Past Medical History   has a past medical history of Acid reflux; Anemia; Anesthesia; Arthritis; At risk for falling; Cataract; Dental disorder; Diabetes; Glaucoma; Hypertension; Hypothyroid (12/10/2018); Neuropathy (HCC); Pneumonia; Snoring;  Urinary frequency; and Urinary incontinence.    Surgical History   has a past surgical history that includes us-needle core bx-breast panel; cataract extraction with iol (Bilateral); eye surgery (Left, 2018); and vitrectomy posterior (Right, 12/11/2018).    Family History  family history is not on file.    Social History   reports that she has never smoked. She has never used smokeless tobacco. She reports that she does not drink alcohol or use drugs.    Medications  Home Medications     Reviewed by Ita Rasheed R.N. (Registered Nurse) on 01/23/19 at 0155  Med List Status: Partial   Medication Last Dose Status   aspirin EC (ECOTRIN) 81 MG TBEC 1/22/2019 Active   calcium carbonate-vitamin D ([employer] [facility] SHE[facility] CALCIUM-VIT D) 500-200 MG-UNIT Tab 1/22/2019 Active   Cholecalciferol (VITAMIN D) 2000 UNIT TABS 1/22/2019 Active   ferrous sulfate 325 (65 Fe) MG tablet 1/22/2019 Active   gabapentin (NEURONTIN) 300 MG CAPS 1/22/2019 Active   glimepiride (AMARYL) 2 MG Tab 1/22/2019 Active   insulin aspart (NOVOLOG) 100 UNIT/ML SOLN 1/22/2019 Active   insulin detemir (LEVEMIR) 100 UNIT/ML SOLN 1/22/2019 Active   latanoprost (XALATAN) 0.005 % SOLN 1/22/2019 Active   lisinopril (PRINIVIL, ZESTRIL) 40 MG tablet 1/22/2019 Active   meclizine (ANTIVERT) 25 MG Tab  Active   omeprazole (PRILOSEC) 20 MG delayed-release capsule 1/22/2019 Active   raNITidine (ZANTAC) 150 MG Tab  Active              Current Facility-Administered Medications   Medication Dose Route Frequency Provider Last Rate Last Dose   • gabapentin (NEURONTIN) capsule 300 mg  300 mg Oral TID Trent Hawkins M.D.   300 mg at 01/23/19 0602   • latanoprost (XALATAN) 0.005 % ophthalmic solution 1 Drop  1 Drop Both Eyes Nightly Trent Hawkins M.D.       • meclizine (ANTIVERT) tablet 25 mg  25 mg Oral TID PRN Trent Hawkins M.D.       • heparin injection 5,000 Units  5,000 Units Subcutaneous Q8HRS Trent Hawkins M.D.   5,000 Units at 01/23/19 0602   • sodium acetate 150 mEq in D5W  1,000 mL   Intravenous Continuous Olegario Garcia M.D. 150 mL/hr at 01/23/19 0828     • patiromer (VELTASSA) powder for oral suspension 8.4 g  8.4 g Oral DAILY Olegario Garcia M.D.   8.4 g at 01/23/19 0913   • pantoprazole (PROTONIX) injection 40 mg  40 mg Intravenous BID Olegario Garcia M.D.   40 mg at 01/23/19 0913   • fentaNYL (SUBLIMAZE) injection 25 mcg  25 mcg Intravenous Q HOUR PRN Olegario Garcia M.D.       • albuterol (PROVENTIL) 2.5mg/0.5ml nebulizer solution 2.5 mg  2.5 mg Nebulization Once Olegario Garcia M.D.   Stopped at 01/23/19 0830   • hydrALAZINE (APRESOLINE) injection 10 mg  10 mg Intravenous Q6HRS PRN Olegario Garcia M.D.       • magnesium sulfate IVPB premix 4 g  4 g Intravenous Once Olegario Garcia M.D. 25 mL/hr at 01/23/19 1029 4 g at 01/23/19 1029   • Pharmacy Consult Request  1 Each Other PHARMACY TO DOSE Trent Hawkins M.D.       • Respiratory Care per Protocol   Nebulization Continuous RT Trent Hawkins M.D.       • acetaminophen (TYLENOL) tablet 650 mg  650 mg Oral Q6HRS PRN Trent Hawkins M.D.       • insulin regular (HUMULIN R) injection 1-6 Units  1-6 Units Subcutaneous 4X/DAY CINDY Hawkins M.D.   Stopped at 01/23/19 0900    And   • glucose 4 g chewable tablet 16 g  16 g Oral Q15 MIN PRN Trent Hawkins M.D.        And   • dextrose 50% (D50W) injection 25 mL  25 mL Intravenous Q15 MIN PRN Trent Hawkins M.D.           Allergies  No Known Allergies    Vital Signs last 24 hours  Temp:  [35.9 °C (96.6 °F)-36.4 °C (97.5 °F)] 36.4 °C (97.5 °F)  Pulse:  [] 74  Resp:  [16-17] 17  BP: (156-183)/(66-76) 183/73  SpO2:  [74 %-99 %] 91 %    Physical Exam  Physical Exam   Constitutional: She is oriented to person, place, and time. She appears well-developed and well-nourished. No distress.   HENT:   Head: Normocephalic and atraumatic.   Eyes: Pupils are equal, round, and reactive to light. EOM are normal.   Neck: No JVD present. No tracheal deviation present.   Cardiovascular: Normal rate, regular rhythm and normal  heart sounds.    No murmur heard.  Pulmonary/Chest: No respiratory distress. She has no wheezes. She has no rales.   Abdominal: Soft. Bowel sounds are normal. She exhibits distension. There is no tenderness. There is no rebound and no guarding.   No mallory signs or RUQ tenderness on exam   Musculoskeletal: She exhibits no edema or tenderness.   Neurological: She is alert and oriented to person, place, and time. No cranial nerve deficit.   Moving all extremities  No facial asymmetry   Skin: Skin is warm and dry.   Psychiatric: She has a normal mood and affect.       Fluids    Intake/Output Summary (Last 24 hours) at 01/23/19 1110  Last data filed at 01/23/19 0400   Gross per 24 hour   Intake             1500 ml   Output                0 ml   Net             1500 ml       Laboratory  Recent Results (from the past 48 hour(s))   URINALYSIS,CULTURE IF INDICATED    Collection Time: 01/22/19  6:05 PM   Result Value Ref Range    Color Yellow     Character Clear     Specific Gravity 1.015 <1.035    Ph 5.0 5.0 - 8.0    Glucose Negative Negative mg/dL    Ketones Negative Negative mg/dL    Protein Negative Negative mg/dL    Bilirubin Negative Negative    Urobilinogen, Urine 0.2 Negative    Nitrite Negative Negative    Leukocyte Esterase Trace (A) Negative    Occult Blood Negative Negative    Micro Urine Req Microscopic    URINE MICROSCOPIC (W/UA)    Collection Time: 01/22/19  6:05 PM   Result Value Ref Range    WBC 5-10 (A) /hpf    RBC 2-5 (A) /hpf    Bacteria Negative None /hpf    Epithelial Cells Negative /hpf    Hyaline Cast 3-5 (A) /lpf   COMP METABOLIC PANEL    Collection Time: 01/22/19  6:24 PM   Result Value Ref Range    Sodium 133 (L) 135 - 145 mmol/L    Potassium 6.1 (H) 3.6 - 5.5 mmol/L    Chloride 109 96 - 112 mmol/L    Co2 18 (L) 20 - 33 mmol/L    Anion Gap 6.0 0.0 - 11.9    Glucose 103 (H) 65 - 99 mg/dL    Bun 30 (H) 8 - 22 mg/dL    Creatinine 2.09 (H) 0.50 - 1.40 mg/dL    Calcium 9.3 8.5 - 10.5 mg/dL    AST(SGOT)  24 12 - 45 U/L    ALT(SGPT) 9 2 - 50 U/L    Alkaline Phosphatase 93 30 - 99 U/L    Total Bilirubin 0.3 0.1 - 1.5 mg/dL    Albumin 4.1 3.2 - 4.9 g/dL    Total Protein 7.0 6.0 - 8.2 g/dL    Globulin 2.9 1.9 - 3.5 g/dL    A-G Ratio 1.4 g/dL   LIPASE    Collection Time: 19  6:24 PM   Result Value Ref Range    Lipase 120 (H) 11 - 82 U/L   ESTIMATED GFR    Collection Time: 19  6:24 PM   Result Value Ref Range    GFR If  28 (A) >60 mL/min/1.73 m 2    GFR If Non African American 23 (A) >60 mL/min/1.73 m 2   CBC WITH DIFFERENTIAL    Collection Time: 19  6:54 PM   Result Value Ref Range    WBC 6.2 4.8 - 10.8 K/uL    RBC 3.56 (L) 4.20 - 5.40 M/uL    Hemoglobin 11.2 (L) 12.0 - 16.0 g/dL    Hematocrit 33.5 (L) 37.0 - 47.0 %    MCV 94.1 81.4 - 97.8 fL    MCH 31.5 27.0 - 33.0 pg    MCHC 33.4 (L) 33.6 - 35.0 g/dL    RDW 43.8 35.9 - 50.0 fL    Platelet Count 163 (L) 164 - 446 K/uL    MPV 10.2 9.0 - 12.9 fL    Neutrophils-Polys 54.80 44.00 - 72.00 %    Lymphocytes 33.90 22.00 - 41.00 %    Monocytes 7.40 0.00 - 13.40 %    Eosinophils 2.90 0.00 - 6.90 %    Basophils 0.80 0.00 - 1.80 %    Immature Granulocytes 0.20 0.00 - 0.90 %    Nucleated RBC 0.00 /100 WBC    Neutrophils (Absolute) 3.41 2.00 - 7.15 K/uL    Lymphs (Absolute) 2.11 1.00 - 4.80 K/uL    Monos (Absolute) 0.46 0.00 - 0.85 K/uL    Eos (Absolute) 0.18 0.00 - 0.51 K/uL    Baso (Absolute) 0.05 0.00 - 0.12 K/uL    Immature Granulocytes (abs) 0.01 0.00 - 0.11 K/uL    NRBC (Absolute) 0.00 K/uL   EKG    Collection Time: 19  7:25 PM   Result Value Ref Range    Report       [facility]    Test Date:  2019  Pt Name:    OLIVIA JOYCE                  Department: ER  MRN:        [id number]                      Room:       [facility]  Gender:     Female                       Technician: [id number]  :        1945                   Requested By:JV CLOUD  Order #:    [id number]                    Reading  MD:    Measurements  Intervals                                Axis  Rate:       56                           P:          45  NE:         176                          QRS:        50  QRSD:       88                           T:          56  QT:         412  QTc:        398    Interpretive Statements  SINUS BRADYCARDIA  BORDERLINE LOW VOLTAGE IN FRONTAL LEADS  Compared to ECG 12/10/2018 12:19:16  Sinus rhythm no longer present     ACCU-CHEK GLUCOSE    Collection Time: 19 12:03 AM   Result Value Ref Range    Glucose - Accu-Ck 46 (L) 65 - 99 mg/dL   ACCU-CHEK GLUCOSE    Collection Time: 19 12:29 AM   Result Value Ref Range    Glucose - Accu-Ck 52 (L) 65 - 99 mg/dL   ACCU-CHEK GLUCOSE    Collection Time: 19  1:00 AM   Result Value Ref Range    Glucose - Accu-Ck 101 (H) 65 - 99 mg/dL   BASIC METABOLIC PANEL    Collection Time: 19  5:47 AM   Result Value Ref Range    Sodium 131 (L) 135 - 145 mmol/L    Potassium 6.9 (HH) 3.6 - 5.5 mmol/L    Chloride 109 96 - 112 mmol/L    Co2 22 20 - 33 mmol/L    Glucose 318 (H) 65 - 99 mg/dL    Bun 27 (H) 8 - 22 mg/dL    Creatinine 2.10 (H) 0.50 - 1.40 mg/dL    Calcium 9.5 8.5 - 10.5 mg/dL    Anion Gap 0.0 0.0 - 11.9   ESTIMATED GFR    Collection Time: 19  5:47 AM   Result Value Ref Range    GFR If  28 (A) >60 mL/min/1.73 m 2    GFR If Non African American 23 (A) >60 mL/min/1.73 m 2   EKG    Collection Time: 19  7:27 AM   Result Value Ref Range    Report       [facility]    Test Date:  2019  Pt Name:    OLIVIA JOYCE                  Department: 171  MRN:        [id number]                      Room:       S122  Gender:     Female                       Technician: [facility]  :        1945                   Requested By:REJI MOSLEY  Order #:    [id number]                    Reading MD: Seferino Siu MD    Measurements  Intervals                                Axis  Rate:       70                           P:          46  NE:          168                          QRS:        44  QRSD:       90                           T:          40  QT:         360  QTc:        389    Interpretive Statements  SINUS RHYTHM  LOW VOLTAGE IN FRONTAL LEADS  Compared to ECG 01/22/2019 19:25:29  Sinus bradycardia no longer present    Electronically Signed On 1- 11:03:04 PST by Seferino Siu MD     BASIC METABOLIC PANEL    Collection Time: 01/23/19  7:46 AM   Result Value Ref Range    Sodium 131 (L) 135 - 145 mmol/L    Potassium 6.7 (HH) 3.6 - 5.5 mmol/L    Chloride 108 96 - 112 mmol/L    Co2 21 20 - 33 mmol/L    Glucose 355 (H) 65 - 99 mg/dL    Bun 27 (H) 8 - 22 mg/dL    Creatinine 1.95 (H) 0.50 - 1.40 mg/dL    Calcium 9.2 8.5 - 10.5 mg/dL    Anion Gap 2.0 0.0 - 11.9   ESTIMATED GFR    Collection Time: 01/23/19  7:46 AM   Result Value Ref Range    GFR If African American 30 (A) >60 mL/min/1.73 m 2    GFR If Non African American 25 (A) >60 mL/min/1.73 m 2   TSH    Collection Time: 01/23/19  8:00 AM   Result Value Ref Range    TSH 9.190 (H) 0.380 - 5.330 uIU/mL   CORTISOL    Collection Time: 01/23/19  8:00 AM   Result Value Ref Range    Cortisol 5.7 0.0 - 23.0 ug/dL   Prothrombin Time    Collection Time: 01/23/19  8:00 AM   Result Value Ref Range    PT 13.1 12.0 - 14.6 sec    INR 0.98 0.87 - 1.13   APTT    Collection Time: 01/23/19  8:00 AM   Result Value Ref Range    APTT 35.0 24.7 - 36.0 sec   CBC WITH DIFFERENTIAL    Collection Time: 01/23/19  8:00 AM   Result Value Ref Range    WBC 5.1 4.8 - 10.8 K/uL    RBC 3.89 (L) 4.20 - 5.40 M/uL    Hemoglobin 11.9 (L) 12.0 - 16.0 g/dL    Hematocrit 37.4 37.0 - 47.0 %    MCV 96.1 81.4 - 97.8 fL    MCH 30.6 27.0 - 33.0 pg    MCHC 31.8 (L) 33.6 - 35.0 g/dL    RDW 45.3 35.9 - 50.0 fL    Platelet Count 178 164 - 446 K/uL    MPV 10.1 9.0 - 12.9 fL    Neutrophils-Polys 63.70 44.00 - 72.00 %    Lymphocytes 24.80 22.00 - 41.00 %    Monocytes 7.70 0.00 - 13.40 %    Eosinophils 2.80 0.00 - 6.90 %    Basophils 0.80 0.00 - 1.80 %     Immature Granulocytes 0.20 0.00 - 0.90 %    Nucleated RBC 0.00 /100 WBC    Neutrophils (Absolute) 3.24 2.00 - 7.15 K/uL    Lymphs (Absolute) 1.26 1.00 - 4.80 K/uL    Monos (Absolute) 0.39 0.00 - 0.85 K/uL    Eos (Absolute) 0.14 0.00 - 0.51 K/uL    Baso (Absolute) 0.04 0.00 - 0.12 K/uL    Immature Granulocytes (abs) 0.01 0.00 - 0.11 K/uL    NRBC (Absolute) 0.00 K/uL   MAGNESIUM    Collection Time: 01/23/19  8:01 AM   Result Value Ref Range    Magnesium 1.5 1.5 - 2.5 mg/dL   PHOSPHORUS    Collection Time: 01/23/19  8:01 AM   Result Value Ref Range    Phosphorus 4.3 2.5 - 4.5 mg/dL   LACTIC ACID    Collection Time: 01/23/19  8:01 AM   Result Value Ref Range    Lactic Acid 1.2 0.5 - 2.0 mmol/L   ACCU-CHEK GLUCOSE    Collection Time: 01/23/19  8:22 AM   Result Value Ref Range    Glucose - Accu-Ck 340 (H) 65 - 99 mg/dL   ACCU-CHEK GLUCOSE    Collection Time: 01/23/19  9:04 AM   Result Value Ref Range    Glucose - Accu-Ck 370 (H) 65 - 99 mg/dL   BASIC METABOLIC PANEL    Collection Time: 01/23/19 10:11 AM   Result Value Ref Range    Sodium 138 135 - 145 mmol/L    Potassium 5.7 (H) 3.6 - 5.5 mmol/L    Chloride 109 96 - 112 mmol/L    Co2 23 20 - 33 mmol/L    Glucose 107 (H) 65 - 99 mg/dL    Bun 27 (H) 8 - 22 mg/dL    Creatinine 2.06 (H) 0.50 - 1.40 mg/dL    Calcium 9.7 8.5 - 10.5 mg/dL    Anion Gap 6.0 0.0 - 11.9   LIPASE    Collection Time: 01/23/19 10:11 AM   Result Value Ref Range    Lipase 65 11 - 82 U/L   ESTIMATED GFR    Collection Time: 01/23/19 10:11 AM   Result Value Ref Range    GFR If  29 (A) >60 mL/min/1.73 m 2    GFR If Non  24 (A) >60 mL/min/1.73 m 2       Imaging  DX-CHEST-PORTABLE (1 VIEW)   Final Result      Hypoinflation. No focal consolidation or pleural effusions.      IR-MIDLINE CATHETER INSERTION W/ GUIDANCE > AGE 5   Final Result                  Ultrasound-guided midline placement performed by qualified nursing staff    as above.          US-RUQ   Final Result      1.   Cholelithiasis.   2.  No evidence for acute cholecystitis or biliary obstruction.   3.  Limited evaluation of pancreas.   4.  Cortical thinning of RIGHT kidney.      CT-RENAL COLIC EVALUATION(A/P W/O)   Final Result      1.  No renal stone or hydronephrosis.   2.  Normal appendix.   3.  Cholelithiasis.   4.  Mildly prominent aortocaval lymph node in the retroperitoneum, nonspecific and new from prior exam.  Neoplasm is not excluded.      [id number]    (Results Pending)       Assessment/Plan  Hyperkalemia- (present on admission)   Assessment & Plan    Continue to monitor serial chemistry  Dextrose, calcium, insulin, lasix given  Hold Ace and NSAiDS   Hydrate with non acidotic fluids  Ekg unremarkable  Monitor I&O's       RUQ pain- (present on admission)   Assessment & Plan    Likely related to back radicular follow up on MRI  Trend labs, monitor symptoms with oral intake     Pancreatitis due to biliary obstruction- (present on admission)   Assessment & Plan    This is on the admission H&P but lipase normal, RUQ us no dilation of bile duct, CT ABD negative for pancreatitis lipase 120 needs to be 3x upper limit 500-900 of normal, T Bili 0.3  Patient does have chololithiasis but abdomen is soft, no fever, or wbc  Doubt acute pancreatic biliary disease  Plan is to get MRI        Acute on chronic kidney failure (HCC)- (present on admission)   Assessment & Plan    BP control avoid ACE  Fluid hydrate with bicarb rich fluid    Maintain euvolemia and monitor fluid responsiveness (avoid NaCL and renal congestion)  MAP > 65 uses pressors or inotropic trial  Monitor urine output and I&O's  Avoid and review nephrotoxin medication  Rule out post obstruction  Consider renal U/S  U/a and CPK  FENA no longer recommend in ICU    No signs of volume overload or uremia     Hyponatremia- (present on admission)   Assessment & Plan    Resolved with fluids hydration     Type 2 diabetes mellitus with hypoglycemia, with long-term current  use of insulin (HCC)- (present on admission)   Assessment & Plan    -180  Sliding scale         Discussed patient condition and risk of morbidity and/or mortality with Family, RN and Patient.    The patient remains critically ill from life threatening hyperkalemia requiring insulin, calcium and lasix and ICU monitoring.  Critical care time = 45 minutes in directly providing and coordinating critical care and extensive data review.  No time overlap and excludes procedures.

## 2019-01-23 NOTE — PROGRESS NOTES
Discussed with Dr Maldonado, he is discussing with intensivist.     Olegario Garcia M.D.  01/23/19  8:13 AM

## 2019-01-23 NOTE — ASSESSMENT & PLAN NOTE
Continue to monitor serial chemistry  Dextrose, calcium, insulin, lasix given  Hold Ace and NSAiDS   Hydrate with non acidotic fluids  Ekg unremarkable  Monitor I&O's

## 2019-01-23 NOTE — PROGRESS NOTES
Patient transported from ED. Patient has all belongings in posesion. Plan of care discussed with patient. No Family at bedside. Patient oriented to floor and surroundings. Patient currently on room air. VSS. Patient currently resting comfortably in bed. Patient has no complaints of pain. Tele box placed. Monitor room notified. 2 rn skin check performed. Patient educated to call for assistance before ambulating. Patient education regarding fall risk. Call light within reach. Fall precautions in place.

## 2019-01-23 NOTE — ASSESSMENT & PLAN NOTE
This is on the admission H&P but lipase normal, RUQ us no dilation of bile duct, CT ABD negative for pancreatitis lipase 120 needs to be 3x upper limit 500-900 of normal, T Bili 0.3  Patient does have chololithiasis but abdomen is soft, no fever, or wbc  Doubt acute pancreatic biliary disease  Plan is to get MRI

## 2019-01-23 NOTE — ASSESSMENT & PLAN NOTE
BP control avoid ACE  Fluid hydrate with bicarb rich fluid    Maintain euvolemia and monitor fluid responsiveness (avoid NaCL and renal congestion)  MAP > 65 uses pressors or inotropic trial  Monitor urine output and I&O's  Avoid and review nephrotoxin medication  Rule out post obstruction  Consider renal U/S  U/a and CPK  FENA no longer recommend in ICU    No signs of volume overload or uremia

## 2019-01-23 NOTE — ASSESSMENT & PLAN NOTE
CKD stage III at baseline  No hydronephrosis on CT renal  Potassium improved, no need for dialysis  Repeat labs AM  Nephrology following

## 2019-01-23 NOTE — ASSESSMENT & PLAN NOTE
Clinical picture not consistent with pancreatitis  Non specific lipase elevation of undetermined significance  Pain improved

## 2019-01-23 NOTE — H&P
Hospital Medicine History & Physical Note    Date of Service  1/22/2019    Primary Care Physician  Pcp Not In Computer    Consultants  None    Code Status  Full code    Chief Complaint  Abdominal pain    History of Presenting Illness  73 y.o. female with a past medical history of CKD, insulin-dependent diabetes mellitus and hypertension who presented 1/22/2019 with right upper quadrant abdominal pain that started this morning at 9 AM.  The patient reported intermittent right upper quadrant abdominal pain with radiation down to her right lower quadrant.  She denied any relieving or exacerbating factors.  She denied any nausea, vomiting, chest pain, shortness of breath, fevers or chills.  She denies any diarrhea or constipation.  Patient reports a history of chronic kidney disease.  She states that she has been eating 3-4 bananas daily.  She denies taking any potassium supplements.    EKG interpreted by me reveals sinus bradycardia.  No evidence of acute ischemia or peaked T waves noted    Review of Systems  Review of Systems   Constitutional: Negative for chills, diaphoresis and fever.   HENT: Negative for hearing loss and sore throat.    Eyes: Negative for blurred vision.   Respiratory: Negative for cough, sputum production, shortness of breath and wheezing.    Cardiovascular: Negative for chest pain, palpitations and leg swelling.   Gastrointestinal: Positive for abdominal pain. Negative for blood in stool, diarrhea, nausea and vomiting.   Genitourinary: Negative for dysuria, flank pain and urgency.   Musculoskeletal: Negative for back pain, joint pain, myalgias and neck pain.   Skin: Negative for rash.   Neurological: Negative for dizziness, focal weakness, seizures and headaches.   Endo/Heme/Allergies: Does not bruise/bleed easily.   Psychiatric/Behavioral: Negative for suicidal ideas.   All other systems reviewed and are negative.      Past Medical History   has a past medical history of Acid reflux; Anemia;  Anesthesia; Arthritis; At risk for falling; Cataract; Dental disorder; Diabetes; Glaucoma; Hypertension; Hypothyroid (12/10/2018); Neuropathy (HCC); Pneumonia; Snoring; Urinary frequency; and Urinary incontinence.    Surgical History   has a past surgical history that includes us-needle core bx-breast panel; cataract extraction with iol (Bilateral); eye surgery (Left, 2018); and vitrectomy posterior (Right, 12/11/2018).     Family History  No pertinent family history    Social History   reports that she has never smoked. She has never used smokeless tobacco. She reports that she does not drink alcohol or use drugs.    Allergies  No Known Allergies    Medications  Prior to Admission Medications   Prescriptions Last Dose Informant Patient Reported? Taking?   Cholecalciferol (VITAMIN D) 2000 UNIT TABS  Patient Yes No   Sig: Take 2,000 Units by mouth every day.   aspirin EC (ECOTRIN) 81 MG TBEC  Patient Yes No   Sig: Take 81 mg by mouth every day.   calcium carbonate-vitamin D ([employer] [facility] CALCIUM-VIT D) 500-200 MG-UNIT Tab  Patient Yes No   Sig: Take 1 Tab by mouth 2 Times a Day.   ferrous sulfate 325 (65 Fe) MG tablet  Patient Yes No   Sig: Take 325 mg by mouth 2 Times a Day.   gabapentin (NEURONTIN) 300 MG CAPS  Patient Yes No   Sig: Take 300 mg by mouth 3 times a day.   glimepiride (AMARYL) 2 MG Tab  Patient Yes No   Sig: Take 2 mg by mouth every morning.   insulin aspart (NOVOLOG) 100 UNIT/ML SOLN  Patient Yes No   Sig: Inject 5-10 Units as instructed 3 times a day before meals. Depending on blood sugar   insulin detemir (LEVEMIR) 100 UNIT/ML SOLN  Patient Yes No   Sig: Inject  as instructed every morning. Depends on blood sugar   latanoprost (XALATAN) 0.005 % SOLN  Patient Yes No   Sig: Place 1 Drop in both eyes every evening.   lisinopril (PRINIVIL, ZESTRIL) 40 MG tablet  Patient Yes No   Sig: Take 40 mg by mouth every morning.   meclizine (ANTIVERT) 25 MG Tab  Patient Yes No   Sig: Take 25 mg by mouth 3  times a day.   omeprazole (PRILOSEC) 20 MG delayed-release capsule  Patient Yes No   Sig: Take 20 mg by mouth every morning.   raNITidine (ZANTAC) 150 MG Tab  Patient Yes No   Sig: Take 150 mg by mouth 2 times a day.      Facility-Administered Medications: None       Physical Exam  Temp:  [35.9 °C (96.6 °F)] 35.9 °C (96.6 °F)  Pulse:  [59-75] 74  Resp:  [16] 16  BP: (168)/(76) 168/76  SpO2:  [93 %-99 %] 93 %    Physical Exam   Constitutional: She is oriented to person, place, and time. She appears well-developed and well-nourished. No distress.   HENT:   Head: Normocephalic and atraumatic.   Mouth/Throat: Oropharynx is clear and moist.   Eyes: Pupils are equal, round, and reactive to light. Conjunctivae are normal.   Neck: Neck supple. No thyromegaly present.   Cardiovascular: Normal rate, regular rhythm and normal heart sounds.    Pulmonary/Chest: Effort normal and breath sounds normal. No respiratory distress. She has no wheezes. She has no rales.   Abdominal: Soft. Bowel sounds are normal. She exhibits no distension. There is no tenderness. There is no rebound.   Musculoskeletal: Normal range of motion. She exhibits no edema or tenderness.   Neurological: She is alert and oriented to person, place, and time. No cranial nerve deficit. Coordination normal.   Skin: Skin is warm and dry.   Psychiatric: She has a normal mood and affect. Her behavior is normal.   Nursing note and vitals reviewed.      Laboratory:  Recent Labs      01/22/19   1854   WBC  6.2   RBC  3.56*   HEMOGLOBIN  11.2*   HEMATOCRIT  33.5*   MCV  94.1   MCH  31.5   MCHC  33.4*   RDW  43.8   PLATELETCT  163*   MPV  10.2     Recent Labs      01/22/19   1824   SODIUM  133*   POTASSIUM  6.1*   CHLORIDE  109   CO2  18*   GLUCOSE  103*   BUN  30*   CREATININE  2.09*   CALCIUM  9.3     Recent Labs      01/22/19   1824   ALTSGPT  9   ASTSGOT  24   ALKPHOSPHAT  93   TBILIRUBIN  0.3   LIPASE  120*   GLUCOSE  103*                 No results for input(s):  TROPONINI in the last 72 hours.    Urinalysis:    Recent Labs      01/22/19   1805   SPECGRAVITY  1.015   GLUCOSEUR  Negative   KETONES  Negative   NITRITE  Negative   LEUKESTERAS  Trace*   WBCURINE  5-10*   RBCURINE  2-5*   BACTERIA  Negative   EPITHELCELL  Negative        Imaging:  CT-RENAL COLIC EVALUATION(A/P W/O)   Final Result      1.  No renal stone or hydronephrosis.   2.  Normal appendix.   3.  Cholelithiasis.   4.  Mildly prominent aortocaval lymph node in the retroperitoneum, nonspecific and new from prior exam.  Neoplasm is not excluded.      US-RUQ    (Results Pending)         Assessment/Plan:  I anticipate this patient will require at least two midnights for appropriate medical management, necessitating inpatient admission.    Hyperkalemia- (present on admission)   Assessment & Plan    Continuous cardiac monitoring  Started on IV fluids and will give 1 dose of IV Lasix  Patient given IV calcium  Unable to give IV insulin due to hypoglycemia  Unable to give Kayexalate due to national shortage  Monitor BMP  If persistent hyperkalemia we will consider consulting nephrology in the morning     Pancreatitis due to biliary obstruction- (present on admission)   Assessment & Plan    IV fluid hydration with normal saline  Clear liquid diet  Pain control with oxycodone  Check right upper quadrant ultrasound  We will consider consulting surgery in the morning     Acute on chronic kidney failure (HCC)- (present on admission)   Assessment & Plan    Likely prerenal  IV fluid hydration with normal saline  Monitor BMP and assess response  Avoid IV contrast/nephrotoxins/NSAIDs  Dose adjust meds for decreased GFR         Type 2 diabetes mellitus with hypoglycemia, with long-term current use of insulin (HCC)- (present on admission)   Assessment & Plan    Hold insulin Levemir  Patient placed on hypoglycemic protocol  Start on insulin sliding scale with serial Accu-Checks  Check hemoglobin A1c  Hypoglycemic protocol in  place         Biliary colic- (present on admission)   Assessment & Plan    We will consider consulting surgery in the morning     Hyponatremia- (present on admission)   Assessment & Plan    Hypovolemic hyponatremia  IV fluid hydration with normal saline  Monitor BMP and assess response           VTE prophylaxis: heparin

## 2019-01-23 NOTE — CARE PLAN
Problem: Safety  Goal: Will remain free from injury  Outcome: PROGRESSING AS EXPECTED  Bed is in lowest and locked position, call light within reach, bed alarm is on.     Problem: Fluid Volume:  Goal: Will maintain balanced intake and output  Outcome: PROGRESSING AS EXPECTED  Strict I/O's while receiving lasix, encourage oral hydration, appropriate IVMF on MAR

## 2019-01-23 NOTE — PROGRESS NOTES
Dr. Dobbs at bedside for rounds. Updates given, all questions and concerns addressed. New orders received.

## 2019-01-23 NOTE — PROGRESS NOTES
Hospital Medicine Daily Progress Note    Date of Service  1/23/2019    Chief Complaint  73 y.o. female admitted 1/22/2019 with Abdominal pain    Hospital Course    Patient With underlying history of CKD stage III, reports that she was getting evaluated and sent to a urologist for probably bladder/kidney infection who gave her an antibiotic, she is unable to remember the name of this antibiotic.  She presented to the hospital with right upper quadrant abdominal pain.  She was noted to have hyperkalemia with PRAMOD on CKD stage III.  CT renal evaluation was obtained which was negative.  Cholelithiasis was noted.  Ultrasound right upper quadrant was obtained which is negative for any concerns, meanwhile patient with worsening hyperkalemia without any medical treatment apart from calcium chloride, potassium level of 6.9 now requiring transfer to the intensive care unit      Interval Problem Update  Patient seen and evaluated on rounds  Discussed with nursing staff  Started bedside  Potassium is critical, 6.9  On presentation 6.1  Did not receive any insulin because she was hypoglycemic  Did not receive Kayexalate because of national shortage  Only calcium chloride received in the emergency department  Patient complaining of right upper quadrant abdominal pain/epigastric pain  Otherwise no complaints, no chest pain, shortness of breath  Stat EKG requested  Start IV Lasix 40 mg  Insulin 10 units after administration of D50  Transferred to ICU, every hour Accu-Cheks  Start patient on acetate drip, unavailability of bicarb push per pharmacy  Continues albuterol nebulization  IV calcium gluconate ordered  Discussed with Dr. Mccann from nephrology  [facility]   We will discuss case with ICU excepting doctors    Consultants/Specialty  Nephrology  CC upon arrival to the ICU     Code Status  FULL CODE     Disposition  Critically ill, transfer to ICU     Review of Systems  Review of Systems   Constitutional: Positive  for diaphoresis and malaise/fatigue. Negative for chills and fever.   HENT: Negative for congestion, ear discharge, ear pain, hearing loss, nosebleeds, sinus pain, sore throat and tinnitus.    Eyes: Negative for blurred vision and double vision.   Respiratory: Negative for cough, hemoptysis, sputum production, shortness of breath, wheezing and stridor.    Cardiovascular: Negative for chest pain, palpitations, orthopnea, claudication, leg swelling and PND.   Gastrointestinal: Positive for abdominal pain, constipation and nausea. Negative for blood in stool, diarrhea, heartburn, melena and vomiting.   Genitourinary: Negative for dysuria, flank pain, frequency, hematuria and urgency.   Musculoskeletal: Negative for back pain, falls, joint pain, myalgias and neck pain.   Skin: Negative for itching and rash.   Neurological: Positive for weakness. Negative for dizziness and headaches.   Psychiatric/Behavioral: Negative for depression, hallucinations, memory loss, substance abuse and suicidal ideas. The patient is not nervous/anxious and does not have insomnia.         Physical Exam  Temp:  [35.9 °C (96.6 °F)-36.4 °C (97.5 °F)] 36.4 °C (97.5 °F)  Pulse:  [] 74  Resp:  [16-17] 17  BP: (156-183)/(66-76) 183/73  SpO2:  [74 %-99 %] 91 %    Physical Exam   Constitutional: She is oriented to person, place, and time. She appears well-developed and well-nourished. No distress.   Body mass index is 30.35 kg/m².   HENT:   Head: Normocephalic.   Mouth/Throat: Oropharynx is clear and moist. No oropharyngeal exudate.   Eyes: Pupils are equal, round, and reactive to light. Conjunctivae are normal. No scleral icterus.   Neck: No JVD present.   Cardiovascular: Normal rate, regular rhythm and normal heart sounds.  Exam reveals no gallop and no friction rub.    No murmur heard.  Pulses:       Posterior tibial pulses are 2+ on the right side, and 2+ on the left side.   Cap refill < 3s   Pulmonary/Chest: Breath sounds normal. No  stridor. No respiratory distress. She has no wheezes. She has no rales.   Abdominal: Soft. Bowel sounds are normal. She exhibits no distension. There is tenderness. There is no rebound and no guarding.   Patient has right upper quadrant tenderness, positive Crenshaw sign.  Otherwise no rebound.  No peritoneal signs.  No costovertebral angle tenderness.   Musculoskeletal: Normal range of motion. She exhibits edema (Minimal). She exhibits no tenderness or deformity.   Neurological: She is alert and oriented to person, place, and time. No cranial nerve deficit.   Skin: Skin is warm and dry. She is not diaphoretic.   Psychiatric: She has a normal mood and affect. Her behavior is normal. Judgment and thought content normal.       Fluids    Intake/Output Summary (Last 24 hours) at 01/23/19 0751  Last data filed at 01/23/19 0400   Gross per 24 hour   Intake             1500 ml   Output                0 ml   Net             1500 ml       Laboratory  Recent Labs      01/22/19   1854   WBC  6.2   RBC  3.56*   HEMOGLOBIN  11.2*   HEMATOCRIT  33.5*   MCV  94.1   MCH  31.5   MCHC  33.4*   RDW  43.8   PLATELETCT  163*   MPV  10.2     Recent Labs      01/22/19   1824  01/23/19   0547   SODIUM  133*  131*   POTASSIUM  6.1*  6.9*   CHLORIDE  109  109   CO2  18*  22   GLUCOSE  103*  318*   BUN  30*  27*   CREATININE  2.09*  2.10*   CALCIUM  9.3  9.5                   Imaging  US-RUQ   Final Result      1.  Cholelithiasis.   2.  No evidence for acute cholecystitis or biliary obstruction.   3.  Limited evaluation of pancreas.   4.  Cortical thinning of RIGHT kidney.      CT-RENAL COLIC EVALUATION(A/P W/O)   Final Result      1.  No renal stone or hydronephrosis.   2.  Normal appendix.   3.  Cholelithiasis.   4.  Mildly prominent aortocaval lymph node in the retroperitoneum, nonspecific and new from prior exam.  Neoplasm is not excluded.      [id number]-O/O    (Results Pending)        Assessment/Plan  Hyperkalemia- (present on admission)    Assessment & Plan    Only calcium chloride given thus far  Presentation 6.1, now 6.9  She believes she got an antibiotic from urologist (Suspect bactrim)  His CKD - Making urine    Notified by nursing staff, at bedside  Stat EKG requested  Transfer to ICU  Stat Lasix 40 mg IV  Stat IV calcium gluconate 1 g  Stat dextrose 50 mL, 50% followed by 10 units of IV insulin  IV bicarbonate is not available, national shortage, discussed with pharmacy acetate drip with D5 at 150 mL's per hour  Kayexalate is not available per pharmacy, national shortage  Start continuous albuterol nebulization  Close monitoring on telemetry  Accu-Cheks every 1 hour  BMP every 2 hours x3 occurrences  Nephrology consultation requested, Discussed with Dr Mccann  Recommend Velt[place] - pharmacy consult for this stat placed  Avoid medications which can cause hyperkalemia      RUQ pain- (present on admission)   Assessment & Plan    RUQ US is negative, CT evaluation is negative - could be biliary colic   I suspect this is hyperkalemia related gastritis   Protonix IV BID  Obtain MRCP   If K stable, obtain HIDA  Check lactic acid levels   Surgical evaluation if persistent and no other etiology evident   Fentanyl for pain control      Pancreatitis due to biliary obstruction- (present on admission)   Assessment & Plan    Lipase elevation not significant enough to call this pancreatitis  Repeat Lipase in 4 hours     Acute on chronic kidney failure (HCC)- (present on admission)   Assessment & Plan    CKD stage III at baseline  No hydronephrosis on CT renal  Nephrology consulted, Dr. Mccann to evaluate patient  Monitor renal function / Avoid nephrotoxins and dose medications renally     Essential hypertension- (present on admission)   Assessment & Plan    Holding lisinopril      Hyponatremia- (present on admission)   Assessment & Plan    Persistent, diuresis now with hyperkalemia  Monitor      Type 2 diabetes mellitus with hypoglycemia, with long-term current  use of insulin (HCC)- (present on admission)   Assessment & Plan    Holding Insulin therapy for now  D50 / Insulin 10 units IV as discussed above  SSI ordered  Accucheck q 1 hourly for now  Further recommendations based on blood sugar changes  For now hypoglycemia, on D5 containing fluid, administering insulin IV for hyperkalemia           VTE prophylaxis: Heparin SC      This patient is critically ill with a life threatening illness as noted above requiring my direct presence, involvement and maximal preparedness. Patient with critical hyperkalemia, requiring my direct presence at bedside, reversal with medications and critical decision making without which high risk of developing cardiac arrest / death. I have personally spend 33 minutes providing critical care to this patient. Time spend on critical care excludes time spend on procedures.

## 2019-01-23 NOTE — ASSESSMENT & PLAN NOTE
Suspected that the patient was taking bactrim prior to admission, she also has chronic kidney disease  Monitor  Constipation relief  Nephrology following

## 2019-01-24 PROBLEM — R79.89 ELEVATED TSH: Status: ACTIVE | Noted: 2019-01-24

## 2019-01-24 PROBLEM — R74.8 ELEVATED LIPASE: Status: ACTIVE | Noted: 2019-01-23

## 2019-01-24 LAB
ALBUMIN SERPL BCP-MCNC: 3.3 G/DL (ref 3.2–4.9)
ALBUMIN/GLOB SERPL: 1.4 G/DL
ALP SERPL-CCNC: 70 U/L (ref 30–99)
ALT SERPL-CCNC: 8 U/L (ref 2–50)
ANION GAP SERPL CALC-SCNC: 5 MMOL/L (ref 0–11.9)
ANION GAP SERPL CALC-SCNC: 7 MMOL/L (ref 0–11.9)
AST SERPL-CCNC: 14 U/L (ref 12–45)
BASOPHILS # BLD AUTO: 0.5 % (ref 0–1.8)
BASOPHILS # BLD: 0.03 K/UL (ref 0–0.12)
BILIRUB SERPL-MCNC: 0.5 MG/DL (ref 0.1–1.5)
BUN SERPL-MCNC: 27 MG/DL (ref 8–22)
CALCIUM SERPL-MCNC: 9 MG/DL (ref 8.5–10.5)
CALCIUM SERPL-MCNC: 9.1 MG/DL (ref 8.5–10.5)
CALCIUM SERPL-MCNC: 9.1 MG/DL (ref 8.5–10.5)
CALCIUM SERPL-MCNC: 9.2 MG/DL (ref 8.5–10.5)
CHLORIDE SERPL-SCNC: 97 MMOL/L (ref 96–112)
CHLORIDE SERPL-SCNC: 98 MMOL/L (ref 96–112)
CO2 SERPL-SCNC: 26 MMOL/L (ref 20–33)
CO2 SERPL-SCNC: 28 MMOL/L (ref 20–33)
CO2 SERPL-SCNC: 29 MMOL/L (ref 20–33)
CO2 SERPL-SCNC: 30 MMOL/L (ref 20–33)
CREAT SERPL-MCNC: 1.92 MG/DL (ref 0.5–1.4)
CREAT SERPL-MCNC: 1.94 MG/DL (ref 0.5–1.4)
CREAT SERPL-MCNC: 1.97 MG/DL (ref 0.5–1.4)
CREAT SERPL-MCNC: 2.08 MG/DL (ref 0.5–1.4)
EOSINOPHIL # BLD AUTO: 0.15 K/UL (ref 0–0.51)
EOSINOPHIL NFR BLD: 2.3 % (ref 0–6.9)
ERYTHROCYTE [DISTWIDTH] IN BLOOD BY AUTOMATED COUNT: 42.5 FL (ref 35.9–50)
GLOBULIN SER CALC-MCNC: 2.4 G/DL (ref 1.9–3.5)
GLUCOSE BLD-MCNC: 110 MG/DL (ref 65–99)
GLUCOSE BLD-MCNC: 131 MG/DL (ref 65–99)
GLUCOSE BLD-MCNC: 160 MG/DL (ref 65–99)
GLUCOSE BLD-MCNC: 58 MG/DL (ref 65–99)
GLUCOSE BLD-MCNC: 64 MG/DL (ref 65–99)
GLUCOSE BLD-MCNC: 92 MG/DL (ref 65–99)
GLUCOSE SERPL-MCNC: 119 MG/DL (ref 65–99)
GLUCOSE SERPL-MCNC: 177 MG/DL (ref 65–99)
GLUCOSE SERPL-MCNC: 62 MG/DL (ref 65–99)
GLUCOSE SERPL-MCNC: 76 MG/DL (ref 65–99)
HCT VFR BLD AUTO: 30.3 % (ref 37–47)
HGB BLD-MCNC: 10 G/DL (ref 12–16)
IMM GRANULOCYTES # BLD AUTO: 0.02 K/UL (ref 0–0.11)
IMM GRANULOCYTES NFR BLD AUTO: 0.3 % (ref 0–0.9)
LYMPHOCYTES # BLD AUTO: 1.98 K/UL (ref 1–4.8)
LYMPHOCYTES NFR BLD: 29.8 % (ref 22–41)
MAGNESIUM SERPL-MCNC: 2.1 MG/DL (ref 1.5–2.5)
MCH RBC QN AUTO: 30.6 PG (ref 27–33)
MCHC RBC AUTO-ENTMCNC: 33 G/DL (ref 33.6–35)
MCV RBC AUTO: 92.7 FL (ref 81.4–97.8)
MONOCYTES # BLD AUTO: 0.49 K/UL (ref 0–0.85)
MONOCYTES NFR BLD AUTO: 7.4 % (ref 0–13.4)
NEUTROPHILS # BLD AUTO: 3.98 K/UL (ref 2–7.15)
NEUTROPHILS NFR BLD: 59.7 % (ref 44–72)
NRBC # BLD AUTO: 0 K/UL
NRBC BLD-RTO: 0 /100 WBC
PHOSPHATE SERPL-MCNC: 4.1 MG/DL (ref 2.5–4.5)
PLATELET # BLD AUTO: 158 K/UL (ref 164–446)
PMV BLD AUTO: 10.1 FL (ref 9–12.9)
POTASSIUM SERPL-SCNC: 4.4 MMOL/L (ref 3.6–5.5)
POTASSIUM SERPL-SCNC: 4.6 MMOL/L (ref 3.6–5.5)
POTASSIUM SERPL-SCNC: 4.6 MMOL/L (ref 3.6–5.5)
POTASSIUM SERPL-SCNC: 5.1 MMOL/L (ref 3.6–5.5)
PROT SERPL-MCNC: 5.7 G/DL (ref 6–8.2)
RBC # BLD AUTO: 3.27 M/UL (ref 4.2–5.4)
SODIUM SERPL-SCNC: 128 MMOL/L (ref 135–145)
SODIUM SERPL-SCNC: 132 MMOL/L (ref 135–145)
SODIUM SERPL-SCNC: 133 MMOL/L (ref 135–145)
SODIUM SERPL-SCNC: 133 MMOL/L (ref 135–145)
T4 FREE SERPL-MCNC: 0.9 NG/DL (ref 0.53–1.43)
WBC # BLD AUTO: 6.7 K/UL (ref 4.8–10.8)

## 2019-01-24 PROCEDURE — 82962 GLUCOSE BLOOD TEST: CPT

## 2019-01-24 PROCEDURE — 36415 COLL VENOUS BLD VENIPUNCTURE: CPT

## 2019-01-24 PROCEDURE — 99233 SBSQ HOSP IP/OBS HIGH 50: CPT | Performed by: INTERNAL MEDICINE

## 2019-01-24 PROCEDURE — 99232 SBSQ HOSP IP/OBS MODERATE 35: CPT | Performed by: HOSPITALIST

## 2019-01-24 PROCEDURE — 770020 HCHG ROOM/CARE - TELE (206)

## 2019-01-24 PROCEDURE — 80048 BASIC METABOLIC PNL TOTAL CA: CPT

## 2019-01-24 PROCEDURE — 700102 HCHG RX REV CODE 250 W/ 637 OVERRIDE(OP): Performed by: INTERNAL MEDICINE

## 2019-01-24 PROCEDURE — A9270 NON-COVERED ITEM OR SERVICE: HCPCS | Performed by: HOSPITALIST

## 2019-01-24 PROCEDURE — 80053 COMPREHEN METABOLIC PANEL: CPT

## 2019-01-24 PROCEDURE — 700111 HCHG RX REV CODE 636 W/ 250 OVERRIDE (IP): Performed by: INTERNAL MEDICINE

## 2019-01-24 PROCEDURE — 83735 ASSAY OF MAGNESIUM: CPT

## 2019-01-24 PROCEDURE — A9270 NON-COVERED ITEM OR SERVICE: HCPCS | Performed by: INTERNAL MEDICINE

## 2019-01-24 PROCEDURE — 700102 HCHG RX REV CODE 250 W/ 637 OVERRIDE(OP): Performed by: HOSPITALIST

## 2019-01-24 PROCEDURE — 85025 COMPLETE CBC W/AUTO DIFF WBC: CPT

## 2019-01-24 PROCEDURE — 84439 ASSAY OF FREE THYROXINE: CPT

## 2019-01-24 PROCEDURE — 84100 ASSAY OF PHOSPHORUS: CPT

## 2019-01-24 PROCEDURE — C9113 INJ PANTOPRAZOLE SODIUM, VIA: HCPCS | Performed by: INTERNAL MEDICINE

## 2019-01-24 RX ORDER — AMOXICILLIN 250 MG
1 CAPSULE ORAL DAILY
Status: DISCONTINUED | OUTPATIENT
Start: 2019-01-24 | End: 2019-01-26 | Stop reason: HOSPADM

## 2019-01-24 RX ORDER — HYDRALAZINE HYDROCHLORIDE 20 MG/ML
20 INJECTION INTRAMUSCULAR; INTRAVENOUS EVERY 4 HOURS PRN
Status: DISCONTINUED | OUTPATIENT
Start: 2019-01-24 | End: 2019-01-26 | Stop reason: HOSPADM

## 2019-01-24 RX ORDER — AMLODIPINE BESYLATE 10 MG/1
10 TABLET ORAL
Status: DISCONTINUED | OUTPATIENT
Start: 2019-01-24 | End: 2019-01-26 | Stop reason: HOSPADM

## 2019-01-24 RX ORDER — OMEPRAZOLE 20 MG/1
20 CAPSULE, DELAYED RELEASE ORAL DAILY
Status: DISCONTINUED | OUTPATIENT
Start: 2019-01-25 | End: 2019-01-26 | Stop reason: HOSPADM

## 2019-01-24 RX ORDER — POLYETHYLENE GLYCOL 3350 17 G/17G
1 POWDER, FOR SOLUTION ORAL DAILY
Status: DISCONTINUED | OUTPATIENT
Start: 2019-01-24 | End: 2019-01-25

## 2019-01-24 RX ADMIN — GABAPENTIN 300 MG: 300 CAPSULE ORAL at 05:56

## 2019-01-24 RX ADMIN — HEPARIN SODIUM 5000 UNITS: 5000 INJECTION, SOLUTION INTRAVENOUS; SUBCUTANEOUS at 15:00

## 2019-01-24 RX ADMIN — GABAPENTIN 300 MG: 300 CAPSULE ORAL at 17:15

## 2019-01-24 RX ADMIN — HEPARIN SODIUM 5000 UNITS: 5000 INJECTION, SOLUTION INTRAVENOUS; SUBCUTANEOUS at 05:56

## 2019-01-24 RX ADMIN — GABAPENTIN 300 MG: 300 CAPSULE ORAL at 12:30

## 2019-01-24 RX ADMIN — PANTOPRAZOLE SODIUM 40 MG: 40 INJECTION, POWDER, LYOPHILIZED, FOR SOLUTION INTRAVENOUS at 05:57

## 2019-01-24 RX ADMIN — AMLODIPINE BESYLATE 10 MG: 10 TABLET ORAL at 08:15

## 2019-01-24 RX ADMIN — HEPARIN SODIUM 5000 UNITS: 5000 INJECTION, SOLUTION INTRAVENOUS; SUBCUTANEOUS at 21:49

## 2019-01-24 RX ADMIN — Medication 1 TABLET: at 17:19

## 2019-01-24 ASSESSMENT — PATIENT HEALTH QUESTIONNAIRE - PHQ9
1. LITTLE INTEREST OR PLEASURE IN DOING THINGS: NOT AT ALL
2. FEELING DOWN, DEPRESSED, IRRITABLE, OR HOPELESS: NOT AT ALL
SUM OF ALL RESPONSES TO PHQ9 QUESTIONS 1 AND 2: 0

## 2019-01-24 ASSESSMENT — ENCOUNTER SYMPTOMS
CHILLS: 0
COUGH: 0
FLANK PAIN: 0
NAUSEA: 0
SPUTUM PRODUCTION: 0
WHEEZING: 0
ABDOMINAL PAIN: 0
SHORTNESS OF BREATH: 0
HEMOPTYSIS: 0
FEVER: 0
DIZZINESS: 0
PALPITATIONS: 0
ORTHOPNEA: 0
VOMITING: 0
DIARRHEA: 0

## 2019-01-24 NOTE — CONSULTS
DATE OF SERVICE:  01/23/2019    NEPHROLOGY CONSULTATION    REQUESTING PHYSICIAN:  Olegario Garcia MD    REASON FOR CONSULTATION:  To evaluate patient with acute kidney injury,   hyperkalemia.    HISTORY OF PRESENT ILLNESS:  The patient is a 73-year-old female with history   of hypertension, diabetes mellitus type 2, chronic kidney disease stage III   with a baseline creatinine level of 1.6, who was admitted to the hospital on   01/22/2019, with complaints of abdominal pain that started suddenly without   other symptoms like nausea, vomiting or diarrhea.  No fever or chills.  No   recent sick contacts.  Upon evaluation, found elevated creatinine level from   her baseline up to 2.09, also hyperkalemia at 6.9.  With treatment, potassium   improved to 6.0.  Currently, patient complains of distention, bloating,   abdominal discomfort, especially on the right upper and lower quadrant.  No   nausea or vomiting.  No difficulties to urinate.  No dysuria or hematuria.  No   flank pain.    REVIEW OF SYSTEMS:  GENERAL:  Positive for fatigue.  No fever or chills.  HEENT:  No sinus pain, no nosebleeds, no sore throat.  NECK:  No pain, no stiffness.  RESPIRATORY:  No shortness of breath, no cough, no hemoptysis.  CARDIOVASCULAR:  No chest pain, no dyspnea, no palpitations.  GASTROINTESTINAL:  As per history of present illness.  GENITOURINARY:  No dysuria or hematuria.  No flank pain.  All other systems reviewed, all negative.    PAST MEDICAL HISTORY:  Chronic kidney disease, anemia, arthritis, pneumonia,   neuropathy, urinary incontinence, hypertension, and diabetes mellitus type 2.    PAST SURGICAL HISTORY:  Breast biopsy and cataract surgery.    FAMILY HISTORY:  Positive for hypertension.    SOCIAL HISTORY:  No tobacco, alcohol or drug use.    ALLERGIES:  No known drug allergies.    OUTPATIENT MEDICATIONS:  Reviewed.    PHYSICAL EXAMINATION:  VITAL SIGNS:  Blood pressure 127/74, heart rate of 95, temperature 36.4    Celsius.  GENERAL APPEARANCE:  Well-developed, well-nourished female, in no acute   distress.  HEENT:  Normocephalic, atraumatic.  Pupils equal, round, reactive to light.    Extraocular movements intact.  Nares patent.  Oropharynx, moist mucosa, no   erythema or exudates.  NECK:  Supple, no lymphadenopathy, no thyromegaly appreciated.  LUNGS:  Clear to auscultation bilaterally.  No wheezes, no rhonchi.  HEART:  Regular rate.  No rub or gallop.  ABDOMEN:  Distended.  Bowel sounds present.  Tenderness to palpation in right   upper and lower quadrant.  No rebound tenderness.  EXTREMITIES:  No cyanosis, no clubbing, no edema.  NEUROLOGIC:  Alert, oriented x3, no focal deficits.    LABORATORY RESULTS:  Reviewed, revealed hemoglobin 11.9.  Sodium 134,   potassium 6.0, BUN 20 and creatinine 2.0.    Urinalysis, no proteinuria, no hematuria.    CT of renal, no renal stones or hydronephrosis, normal appendix.    MRI of the abdomen without contrast, positive for chololithiasis.    ASSESSMENT AND PLAN:  The patient is a 73-year-old female who was admitted   with abdominal pain, acute kidney injury, and hyperkalemia.  1.  Acute kidney injury, nonoliguric.  Continue to monitor closely, continue   IV fluids.  2.  Hyperkalemia, improved.  Continue bicarbonate drip.  3.  Hypertension.  Blood pressure remains well controlled.  4.  Anemia.  To monitor hemoglobin level.    RECOMMENDATIONS:  Continue current treatment.  Continue bicarbonate drip,   close monitoring of basic metabolic panel.  VELTASSA is needed for   hyperkalemia control and to provide low potassium diet once diet resumes.  We   will follow up patient closely.  No need for emergent dialysis; however, if   renal function and hyperkalemia worse, we will consider to start renal   replacement therapy.       ____________________________________     MD CHITO COOPER / ADAM    DD:  01/23/2019 16:01:00  DT:  01/23/2019 16:41:29    D#:  [id number]  Job#:  [id number]

## 2019-01-24 NOTE — PROGRESS NOTES
2 RN skin check with Pam:    Indentation over bridge of nose from glasses, blanching  Bilateral elbows dry, blanching  Bilateral heels dry, calloused, blanching   Sacrum intact

## 2019-01-24 NOTE — PROGRESS NOTES
Nephrology Daily Progress Note    Date of Service  1/24/2019    Chief Complaint  73 y.o. female admitted 1/22/2019 with abdominal pain, PRAMOD, hyperkalemia    Interval Problem Update  Doing much better  Good UOP  Creat slowly improving  Hyperkalemia corrected  No N/V  Abdominal pain resolved    Review of Systems  Review of Systems   Constitutional: Positive for malaise/fatigue. Negative for chills and fever.   HENT: Negative.    Respiratory: Negative for cough, hemoptysis, shortness of breath and wheezing.    Cardiovascular: Negative for chest pain, palpitations, orthopnea and leg swelling.   Gastrointestinal: Negative for abdominal pain, diarrhea, nausea and vomiting.   Genitourinary: Negative for dysuria, flank pain, frequency, hematuria and urgency.   Skin: Negative.    All other systems reviewed and are negative.       Physical Exam  Temp:  [36.6 °C (97.9 °F)-37.1 °C (98.7 °F)] 36.8 °C (98.2 °F)  Pulse:  [48-99] 71  Resp:  [15-39] 19  SpO2:  [92 %-100 %] 92 %    Physical Exam   Constitutional: She is oriented to person, place, and time. She appears well-developed and well-nourished. No distress.   HENT:   Head: Normocephalic and atraumatic.   Nose: Nose normal.   Mouth/Throat: Oropharynx is clear and moist.   Eyes: Pupils are equal, round, and reactive to light. Conjunctivae and EOM are normal. No scleral icterus.   Neck: Normal range of motion. Neck supple. No thyromegaly present.   Cardiovascular: Normal rate and regular rhythm.  Exam reveals no gallop and no friction rub.    Pulmonary/Chest: Effort normal and breath sounds normal. No respiratory distress. She has no wheezes. She has no rales.   Abdominal: Soft. Bowel sounds are normal. She exhibits no distension. There is no tenderness.   Musculoskeletal: She exhibits no edema.   Lymphadenopathy:     She has no cervical adenopathy.   Neurological: She is alert and oriented to person, place, and time. No cranial nerve deficit.   Skin: Skin is warm. No rash  noted. No erythema.   Nursing note and vitals reviewed.      Fluids    Intake/Output Summary (Last 24 hours) at 01/24/19 1452  Last data filed at 01/24/19 0800   Gross per 24 hour   Intake          2479.58 ml   Output              950 ml   Net          1529.58 ml       Laboratory  Recent Labs      01/22/19   1854  01/23/19   0800  01/24/19   0155   WBC  6.2  5.1  6.7   RBC  3.56*  3.89*  3.27*   HEMOGLOBIN  11.2*  11.9*  10.0*   HEMATOCRIT  33.5*  37.4  30.3*   MCV  94.1  96.1  92.7   MCH  31.5  30.6  30.6   MCHC  33.4*  31.8*  33.0*   RDW  43.8  45.3  42.5   PLATELETCT  163*  178  158*   MPV  10.2  10.1  10.1     Recent Labs      01/24/19   0155  01/24/19   0400  01/24/19   0540   SODIUM  133*  132*  133*   POTASSIUM  4.6  4.4  4.6   CHLORIDE  98  98  98   CO2  28  29  30   GLUCOSE  119*  76  62*   BUN  27*  27*  27*   CREATININE  2.08*  1.97*  1.94*   CALCIUM  9.2  9.0  9.1     Recent Labs      01/23/19   0800   APTT  35.0   INR  0.98               Imaging  reviewed    Assessment/Plan  1.PRAMOD -improving, good UOP -to monitor  2.HTN: slightly elevated BP -to monitor  3.Electrolytes: hyperkalemia corrected  4.Anemia: drop in Hb level -to monitor    Recs:  D/c bicarb gtt  Daily BMP  Avoid nephrotoxic agents  Keep well hydrated  No need for dialysis

## 2019-01-24 NOTE — PROGRESS NOTES
Hospital Medicine Daily Progress Note    Date of Service  1/24/2019    Chief Complaint  73 y.o. female admitted 1/22/2019 with Abdominal pain    Hospital Course    Ms Hernandez has a history of hypertension and chronic kidney disease.  Prior to the mid admission the patient had been treated with antibiotics for a urinary tract infection.  She developed abdominal pain and was came to the emergency room on 1/20/2019.  Evaluation included labs showing hyperkalemia, worsened renal failure, and elevated lipase.  She was admitted to the hospital.  Repeat labs demonstrated worsening hyperkalemia, she was transferred to the ICU on 1/23/2019.  Patient was seen in consultation by nephrology, she was treated with a bicarb drip and Veltassa.  Potassium was improved, her abdominal pain is also improving, MRCP does not show any evidence of biliary obstruction.      Interval Problem Update  Previously described severe abdominal pain has resolved, she still has a twinge of pain in the right flank hurts worse when she coughs, tolerating diet, no nausea or vomiting.  2.6 L of urine over the last 24 hours  Blood pressure uncontrolled with systolic blood pressures to the 180s, she denies headache, no chest pain    Consultants/Specialty  Nephrology  Critical Care, I discussed the patient's condition with Dr. Dobbs today on ICU Rounds     Code Status  Full Code    Disposition  OK to transfer out of the ICU, orders written on 1/24/19    Review of Systems  Review of Systems   Constitutional: Negative for chills and malaise/fatigue.   Respiratory: Negative for cough, hemoptysis and sputum production.    Cardiovascular: Negative for chest pain, palpitations and orthopnea.   Gastrointestinal: Negative for nausea and vomiting.   Skin: Negative for itching and rash.   Neurological: Negative for dizziness.   All other systems reviewed and are negative.       Physical Exam  Temp:  [36.4 °C (97.6 °F)-37.2 °C (98.9 °F)] 36.7 °C (98.1 °F)  Pulse:   [] 66  Resp:  [15-39] 21  SpO2:  [90 %-100 %] 100 %    Physical Exam   Constitutional: She is oriented to person, place, and time. She appears well-developed and well-nourished.   HENT:   Head: Normocephalic and atraumatic.   Eyes: Conjunctivae and EOM are normal. Right eye exhibits no discharge. Left eye exhibits no discharge.   Neck: Normal range of motion. Neck supple.   Cardiovascular: Normal rate, regular rhythm and intact distal pulses.    No murmur heard.  2+ Radial Pulses  Brisk Capillary Refill   Pulmonary/Chest: Effort normal and breath sounds normal. No respiratory distress. She has no wheezes.   Abdominal: Soft. Bowel sounds are normal. She exhibits no distension. There is no tenderness. There is no rebound and no guarding.   Musculoskeletal: Normal range of motion. She exhibits no edema.   Neurological: She is alert and oriented to person, place, and time. No cranial nerve deficit.   Skin: Skin is warm and dry. She is not diaphoretic. No erythema.   Skin is warm and well perfused   Psychiatric: She has a normal mood and affect.       Fluids    Intake/Output Summary (Last 24 hours) at 01/24/19 0809  Last data filed at 01/24/19 0600   Gross per 24 hour   Intake           3731.5 ml   Output             1950 ml   Net           1781.5 ml       Laboratory  Recent Labs      01/22/19   1854  01/23/19   0800  01/24/19   0155   WBC  6.2  5.1  6.7   RBC  3.56*  3.89*  3.27*   HEMOGLOBIN  11.2*  11.9*  10.0*   HEMATOCRIT  33.5*  37.4  30.3*   MCV  94.1  96.1  92.7   MCH  31.5  30.6  30.6   MCHC  33.4*  31.8*  33.0*   RDW  43.8  45.3  42.5   PLATELETCT  163*  178  158*   MPV  10.2  10.1  10.1     Recent Labs      01/24/19   0155  01/24/19   0400  01/24/19   0540   SODIUM  133*  132*  133*   POTASSIUM  4.6  4.4  4.6   CHLORIDE  98  98  98   CO2  28  29  30   GLUCOSE  119*  76  62*   BUN  27*  27*  27*   CREATININE  2.08*  1.97*  1.94*   CALCIUM  9.2  9.0  9.1     Recent Labs      01/23/19   0800   APTT  35.0    INR  0.98               Imaging  [id number]   Final Result      1.  There is cholelithiasis. There is no wall thickening or pericholecystic fluid.   2.  Biliary tree is normal.   3.  Pancreas and pancreatic duct are normal.      DX-CHEST-PORTABLE (1 VIEW)   Final Result      Hypoinflation. No focal consolidation or pleural effusions.      IR-MIDLINE CATHETER INSERTION W/ GUIDANCE > AGE 5   Final Result                  Ultrasound-guided midline placement performed by qualified nursing staff    as above.          US-RUQ   Final Result      1.  Cholelithiasis.   2.  No evidence for acute cholecystitis or biliary obstruction.   3.  Limited evaluation of pancreas.   4.  Cortical thinning of RIGHT kidney.      CT-RENAL COLIC EVALUATION(A/P W/O)   Final Result      1.  No renal stone or hydronephrosis.   2.  Normal appendix.   3.  Cholelithiasis.   4.  Mildly prominent aortocaval lymph node in the retroperitoneum, nonspecific and new from prior exam.  Neoplasm is not excluded.           Assessment/Plan  Hyperkalemia- (present on admission)   Assessment & Plan    Suspected that the patient was taking bactrim prior to admission, she also has chronic kidney disease  Hyperkalemia has improved, OK to transfer out of the ICU  Appreciate nephrology consultation, discontinue bicarb drip  Continue Veltassa     Elevated TSH- (present on admission)   Assessment & Plan    Sigificant elevation, check T4     RUQ pain- (present on admission)   Assessment & Plan    Improved/resolved  ?constipation, ?passed gallstone  Monitor for recurrence of symptoms    MRCP reviewed:  1.  There is cholelithiasis. There is no wall thickening or pericholecystic fluid.  2.  Biliary tree is normal.  3.  Pancreas and pancreatic duct are normal.     Elevated lipase- (present on admission)   Assessment & Plan    Clinical picture not consistent with pancreatitis  Non specific lipase elevation of undetermined significance  Pain improved     Acute on  chronic kidney failure (HCC)- (present on admission)   Assessment & Plan    CKD stage III at baseline  No hydronephrosis on CT renal  Potassium improved, no need for dialysis  Repeat labs AM       Essential hypertension- (present on admission)   Assessment & Plan    ACE contraindicated with hyperkalemia/renal failure  Blood pressure is uncontrolled with SBP's to 180's, add norvasc     Hyponatremia- (present on admission)   Assessment & Plan    Mild hyponatremia  Repeat labs AM     Type 2 diabetes mellitus with hypoglycemia, with long-term current use of insulin ([facility])- (present on admission)   Assessment & Plan    The patient is normoglycemic, hold off on any insulin for now          VTE prophylaxis: Heparin

## 2019-01-24 NOTE — PROGRESS NOTES
Dr. Mccann at bedside, OK to D/C sodium acetate after this liter finishes and to switch to NS, also OK to D/C jenniffer

## 2019-01-24 NOTE — PROGRESS NOTES
Patient transported to T733/1 on monitor with ACLS RN and CCT. All belongings and chart with patient

## 2019-01-24 NOTE — PROGRESS NOTES
2 RN skin check complete. No areas of concern noted. Appropriate interventions in place to prevent skin breakdown.

## 2019-01-24 NOTE — PROGRESS NOTES
Will sign off please reconsult if we can be of service.     Alex Dbobs MD  Critical Care Medicine

## 2019-01-24 NOTE — PROGRESS NOTES
"Patient's fsbs 58mg/dL. Patient provided 4 ounces of orange juice. Pt states she \"feels fine\" and \"has been low before but I [she] don't feel low right now\". Pt A&Ox4. Pt is not diaphoretic, dizzy or light headed.     Will re-assess fsbs in 15 minutes per order.  "

## 2019-01-24 NOTE — PROGRESS NOTES
Attempted to call report to tele RN. Unit clerk said she was on the phone and will call me back at [phone].

## 2019-01-24 NOTE — PROGRESS NOTES
Pt fsbs taken 15 minutes after previous. FSBS 92mg/dL up from 64mg/dL. Patient provided 4 ounces of orange juice. Dr. Dobbs aware of hypoglycemia. Pt asymptomatic. Will reassess fsbs in 15 minutes.

## 2019-01-24 NOTE — CARE PLAN
Problem: Safety  Goal: Will remain free from falls  Outcome: PROGRESSING AS EXPECTED  Bed is in lowest and locked position, call light within reach, bed alarm is on. Patient oriented to room and plan of care for the day. Patient educated on how to use call light and to call when needed assistance

## 2019-01-24 NOTE — CARE PLAN
Problem: Infection  Goal: Will remain free from infection    Intervention: Implement standard precautions and perform hand washing before and after patient contact  Standard precautions implemented to prevent infection. Hand hygiene performed before and after patient contact. Hand hygiene performed in front of patient.      Problem: Venous Thromboembolism (VTW)/Deep Vein Thrombosis (DVT) Prevention:  Goal: Patient will participate in Venous Thrombosis (VTE)/Deep Vein Thrombosis (DVT)Prevention Measures    Intervention: Ensure patient wears graduated elastic stockings (MITCH hose) and/or SCDs, if ordered, when in bed or chair (Remove at least once per shift for skin check)  Pt wearing SCDs. SCDs turned on and applied appropriately. Skin check performed under SCDs.

## 2019-01-24 NOTE — PROGRESS NOTES
Received report from Shahla KLEIN. Assumed care of patient at 1530. Patient A&Ox4. On room air, no signs of respiratory distress. Patient denies pain at this time. POC discussed and agreed upon with patient. Call light and belongings within reach. Bed in lowest locked position. Upper side rails raised. Hourly rounding in place. Will continue to monitor.

## 2019-01-24 NOTE — PROGRESS NOTES
FSBS taken 15 minutes after previous. FSBS 64mg/dL at 0605 up from 58mg/dL. Patient provided 4 ounces of orange juice. Patient asymptomatic. Dr. Dobbs notified. Dr. Dobbs ordered to discontinue sliding scale in MAR. Order repeated back to Dr. Dobbs.

## 2019-01-25 LAB
ANION GAP SERPL CALC-SCNC: 6 MMOL/L (ref 0–11.9)
BUN SERPL-MCNC: 26 MG/DL (ref 8–22)
CALCIUM SERPL-MCNC: 8.2 MG/DL (ref 8.5–10.5)
CHLORIDE SERPL-SCNC: 98 MMOL/L (ref 96–112)
CO2 SERPL-SCNC: 29 MMOL/L (ref 20–33)
CREAT SERPL-MCNC: 1.93 MG/DL (ref 0.5–1.4)
GLUCOSE BLD-MCNC: 243 MG/DL (ref 65–99)
GLUCOSE BLD-MCNC: 271 MG/DL (ref 65–99)
GLUCOSE BLD-MCNC: 295 MG/DL (ref 65–99)
GLUCOSE BLD-MCNC: 299 MG/DL (ref 65–99)
GLUCOSE SERPL-MCNC: 184 MG/DL (ref 65–99)
POTASSIUM SERPL-SCNC: 5 MMOL/L (ref 3.6–5.5)
SODIUM SERPL-SCNC: 133 MMOL/L (ref 135–145)

## 2019-01-25 PROCEDURE — A9270 NON-COVERED ITEM OR SERVICE: HCPCS | Performed by: INTERNAL MEDICINE

## 2019-01-25 PROCEDURE — 700111 HCHG RX REV CODE 636 W/ 250 OVERRIDE (IP): Performed by: INTERNAL MEDICINE

## 2019-01-25 PROCEDURE — 36415 COLL VENOUS BLD VENIPUNCTURE: CPT

## 2019-01-25 PROCEDURE — 99232 SBSQ HOSP IP/OBS MODERATE 35: CPT | Performed by: INTERNAL MEDICINE

## 2019-01-25 PROCEDURE — A9270 NON-COVERED ITEM OR SERVICE: HCPCS | Performed by: HOSPITALIST

## 2019-01-25 PROCEDURE — 770020 HCHG ROOM/CARE - TELE (206)

## 2019-01-25 PROCEDURE — 80048 BASIC METABOLIC PNL TOTAL CA: CPT

## 2019-01-25 PROCEDURE — 700102 HCHG RX REV CODE 250 W/ 637 OVERRIDE(OP): Performed by: INTERNAL MEDICINE

## 2019-01-25 PROCEDURE — 82962 GLUCOSE BLOOD TEST: CPT

## 2019-01-25 PROCEDURE — 700102 HCHG RX REV CODE 250 W/ 637 OVERRIDE(OP): Performed by: HOSPITALIST

## 2019-01-25 RX ORDER — ENEMA 19; 7 G/133ML; G/133ML
1 ENEMA RECTAL ONCE
Status: DISCONTINUED | OUTPATIENT
Start: 2019-01-25 | End: 2019-01-25

## 2019-01-25 RX ORDER — INSULIN GLARGINE 100 [IU]/ML
5 INJECTION, SOLUTION SUBCUTANEOUS EVERY EVENING
Status: DISCONTINUED | OUTPATIENT
Start: 2019-01-25 | End: 2019-01-26 | Stop reason: HOSPADM

## 2019-01-25 RX ORDER — DEXTROSE MONOHYDRATE 25 G/50ML
25 INJECTION, SOLUTION INTRAVENOUS
Status: DISCONTINUED | OUTPATIENT
Start: 2019-01-25 | End: 2019-01-25

## 2019-01-25 RX ORDER — POLYETHYLENE GLYCOL 3350 17 G/17G
1 POWDER, FOR SOLUTION ORAL 2 TIMES DAILY
Status: DISCONTINUED | OUTPATIENT
Start: 2019-01-26 | End: 2019-01-26 | Stop reason: HOSPADM

## 2019-01-25 RX ORDER — DEXTROSE MONOHYDRATE 25 G/50ML
25 INJECTION, SOLUTION INTRAVENOUS
Status: DISCONTINUED | OUTPATIENT
Start: 2019-01-25 | End: 2019-01-26 | Stop reason: HOSPADM

## 2019-01-25 RX ADMIN — POLYETHYLENE GLYCOL 3350 1 PACKET: 17 POWDER, FOR SOLUTION ORAL at 05:08

## 2019-01-25 RX ADMIN — OMEPRAZOLE 20 MG: 20 CAPSULE, DELAYED RELEASE ORAL at 05:08

## 2019-01-25 RX ADMIN — GABAPENTIN 300 MG: 300 CAPSULE ORAL at 17:13

## 2019-01-25 RX ADMIN — INSULIN GLARGINE 5 UNITS: 100 INJECTION, SOLUTION SUBCUTANEOUS at 20:28

## 2019-01-25 RX ADMIN — GABAPENTIN 300 MG: 300 CAPSULE ORAL at 05:08

## 2019-01-25 RX ADMIN — AMLODIPINE BESYLATE 10 MG: 10 TABLET ORAL at 05:08

## 2019-01-25 RX ADMIN — Medication 1 TABLET: at 05:07

## 2019-01-25 RX ADMIN — MAGNESIUM CITRATE 296 ML: 1.75 LIQUID ORAL at 11:18

## 2019-01-25 RX ADMIN — HEPARIN SODIUM 5000 UNITS: 5000 INJECTION, SOLUTION INTRAVENOUS; SUBCUTANEOUS at 05:08

## 2019-01-25 RX ADMIN — LATANOPROST 1 DROP: 50 SOLUTION OPHTHALMIC at 20:33

## 2019-01-25 RX ADMIN — HEPARIN SODIUM 5000 UNITS: 5000 INJECTION, SOLUTION INTRAVENOUS; SUBCUTANEOUS at 13:37

## 2019-01-25 RX ADMIN — PATIROMER 8.4 G: 8.4 POWDER, FOR SUSPENSION ORAL at 05:07

## 2019-01-25 RX ADMIN — HEPARIN SODIUM 5000 UNITS: 5000 INJECTION, SOLUTION INTRAVENOUS; SUBCUTANEOUS at 22:08

## 2019-01-25 RX ADMIN — GABAPENTIN 300 MG: 300 CAPSULE ORAL at 11:28

## 2019-01-25 ASSESSMENT — ENCOUNTER SYMPTOMS
FLANK PAIN: 0
DEPRESSION: 0
VOMITING: 0
CONSTIPATION: 1
COUGH: 0
BLURRED VISION: 0
WHEEZING: 0
SEIZURES: 0
BACK PAIN: 0
MYALGIAS: 0
PALPITATIONS: 0
HEADACHES: 0
HALLUCINATIONS: 0
ORTHOPNEA: 0
ABDOMINAL PAIN: 0
SENSORY CHANGE: 0
LOSS OF CONSCIOUSNESS: 0
PND: 0
CHILLS: 0
BLOOD IN STOOL: 0
MEMORY LOSS: 0
FEVER: 0
SPEECH CHANGE: 0
DIARRHEA: 0
NERVOUS/ANXIOUS: 0
SHORTNESS OF BREATH: 0
TREMORS: 0
DOUBLE VISION: 0
DIZZINESS: 0
HEARTBURN: 0
HEMOPTYSIS: 0
WEAKNESS: 0
TINGLING: 0
SPUTUM PRODUCTION: 0
FOCAL WEAKNESS: 0
INSOMNIA: 0
NAUSEA: 0
FALLS: 0
DIAPHORESIS: 0
NECK PAIN: 0

## 2019-01-25 ASSESSMENT — LIFESTYLE VARIABLES: SUBSTANCE_ABUSE: 0

## 2019-01-25 NOTE — CONSULTS
[facility] Nephrology Consult Note    I have seen and examined this patient. Please see my dictated consult note for full details.   73yoF with PMH significant for CKD Stage III, HTN, DM II, Hyperlipidemia admitted with right flank pain/RUQ pain and was found to have hyperkalemia K 6.1 and PRAMOD with Cr 2.1 and imaging revealed cholelithiasis but no acute cholecystitis.   Assessment/Plan:  1. PRAMOD on CKD Stage III--followed by Dr. Mon as outpatient, baseline Cr 1.6, now with PRAMOD possibly pre-renal etiology or related to recent outpt abx (pt may have been on bactrim for UTI)  --Cr has improved but not back to baseline  --Encourage PO fluids  --Avoid IV contrast/nephrotoxins/NSAIDs  --Dose adjust meds for decreased GFR  2. Hyperkalemia--K 6.1 on admit and then trended up to 6.9, pt was treated medically with sodium acetate drip and valtessa. Pt may have been on bactrim as outpt, also some increased dietary K  --K now stable  --Low K diet  --Monitor  3. HTN--BP had been high, now under fair control  --Monitor  4. DM II--management per primary svc  5. Hyponatremia--mild, monitor for now  6. Abdominal Pain--now resolved, imaging showed cholelithiasis but no acute cholecystitis  --supportive care  7. Metabolic Alkalosis--monitor  8. Anemia--check iron studies  9. Frequent UTI's--UA on admission not suggestive of UTI  --Pt has been referred to urology as outpatient  10. Constipation--pt did have a BM earlier today  --Avoid Fleets Enemas in this CKD patient, other enemas ok if needed    Report Confirmation #[id number]

## 2019-01-25 NOTE — PROGRESS NOTES
Received report from NOC RN. Assumed care of patient at 0700. Patient A&Ox4. On room air, no signs of respiratory distress. Patient denies pain at this time. POC discussed and agreed upon with patient. Call light and belongings within reach. Bed in lowest locked position. Upper side rails raised. Fall risk precautions in place. All questions answered at this time. Will continue to monitor.

## 2019-01-25 NOTE — PROGRESS NOTES
Patient states she had a medium bowel movement, not seen by this RN. Dr. Garcia updated on patient's bowel movement, and wants the magnesium citrate given and the fleet enema held. Order received via telephone with read back.

## 2019-01-25 NOTE — CARE PLAN
Problem: Communication  Goal: The ability to communicate needs accurately and effectively will improve    Intervention: Educate patient and significant other/support system about the plan of care, procedures, treatments, medications and allow for questions  Will encourage pt to voice concerns and or feelings.

## 2019-01-25 NOTE — PROGRESS NOTES
Dr. Garcia at bedside, order received via verbal with read back to start accu-cheks and insulin sliding scale now instead of 1700.

## 2019-01-26 ENCOUNTER — PATIENT OUTREACH (OUTPATIENT)
Dept: HEALTH INFORMATION MANAGEMENT | Facility: OTHER | Age: 74
End: 2019-01-26

## 2019-01-26 VITALS
WEIGHT: 176.59 LBS | HEIGHT: 64 IN | TEMPERATURE: 97.8 F | SYSTOLIC BLOOD PRESSURE: 128 MMHG | DIASTOLIC BLOOD PRESSURE: 64 MMHG | HEART RATE: 63 BPM | OXYGEN SATURATION: 91 % | BODY MASS INDEX: 30.15 KG/M2 | RESPIRATION RATE: 17 BRPM

## 2019-01-26 LAB
ANION GAP SERPL CALC-SCNC: 7 MMOL/L (ref 0–11.9)
BUN SERPL-MCNC: 27 MG/DL (ref 8–22)
CALCIUM SERPL-MCNC: 8.5 MG/DL (ref 8.5–10.5)
CHLORIDE SERPL-SCNC: 100 MMOL/L (ref 96–112)
CO2 SERPL-SCNC: 28 MMOL/L (ref 20–33)
CREAT SERPL-MCNC: 1.82 MG/DL (ref 0.5–1.4)
GLUCOSE BLD-MCNC: 126 MG/DL (ref 65–99)
GLUCOSE BLD-MCNC: 136 MG/DL (ref 65–99)
GLUCOSE BLD-MCNC: 72 MG/DL (ref 65–99)
GLUCOSE SERPL-MCNC: 69 MG/DL (ref 65–99)
POTASSIUM SERPL-SCNC: 4.4 MMOL/L (ref 3.6–5.5)
SODIUM SERPL-SCNC: 135 MMOL/L (ref 135–145)

## 2019-01-26 PROCEDURE — 80048 BASIC METABOLIC PNL TOTAL CA: CPT

## 2019-01-26 PROCEDURE — 99239 HOSP IP/OBS DSCHRG MGMT >30: CPT | Performed by: INTERNAL MEDICINE

## 2019-01-26 PROCEDURE — 700102 HCHG RX REV CODE 250 W/ 637 OVERRIDE(OP): Performed by: HOSPITALIST

## 2019-01-26 PROCEDURE — A9270 NON-COVERED ITEM OR SERVICE: HCPCS | Performed by: HOSPITALIST

## 2019-01-26 PROCEDURE — 700102 HCHG RX REV CODE 250 W/ 637 OVERRIDE(OP): Performed by: INTERNAL MEDICINE

## 2019-01-26 PROCEDURE — A9270 NON-COVERED ITEM OR SERVICE: HCPCS | Performed by: INTERNAL MEDICINE

## 2019-01-26 PROCEDURE — 700111 HCHG RX REV CODE 636 W/ 250 OVERRIDE (IP): Performed by: INTERNAL MEDICINE

## 2019-01-26 PROCEDURE — 36415 COLL VENOUS BLD VENIPUNCTURE: CPT

## 2019-01-26 PROCEDURE — 82962 GLUCOSE BLOOD TEST: CPT | Mod: 91

## 2019-01-26 RX ORDER — POLYETHYLENE GLYCOL 3350 17 G/17G
17 POWDER, FOR SOLUTION ORAL 2 TIMES DAILY
Qty: 60 EACH | Refills: 0 | Status: SHIPPED | OUTPATIENT
Start: 2019-01-26 | End: 2019-04-10

## 2019-01-26 RX ORDER — AMLODIPINE BESYLATE 10 MG/1
10 TABLET ORAL DAILY
Qty: 30 TAB | Refills: 0 | Status: SHIPPED | OUTPATIENT
Start: 2019-01-27 | End: 2022-09-09

## 2019-01-26 RX ADMIN — HEPARIN SODIUM 5000 UNITS: 5000 INJECTION, SOLUTION INTRAVENOUS; SUBCUTANEOUS at 05:41

## 2019-01-26 RX ADMIN — GABAPENTIN 300 MG: 300 CAPSULE ORAL at 05:41

## 2019-01-26 RX ADMIN — AMLODIPINE BESYLATE 10 MG: 10 TABLET ORAL at 05:41

## 2019-01-26 RX ADMIN — ACETAMINOPHEN 650 MG: 325 TABLET, FILM COATED ORAL at 00:05

## 2019-01-26 RX ADMIN — OMEPRAZOLE 20 MG: 20 CAPSULE, DELAYED RELEASE ORAL at 05:40

## 2019-01-26 NOTE — PROGRESS NOTES
Chem panel showed BG at 69.    Went into pts room and had her drink a carton of apple juice.     Rechecked BG with accucheck and BG was 126.

## 2019-01-26 NOTE — CONSULTS
DATE OF SERVICE:  01/25/2019    REQUESTING PHYSICIAN:  Rissa Mccann MD (nephrologist).    REASON FOR CONSULTATION:  Evaluation and management of chronic kidney disease.    HISTORY OF PRESENT ILLNESS:  This is a 73-year-old female with past medical   history significant for chronic kidney disease stage III, followed by [facility], Dr. Mon, hypertension, diabetes mellitus type 2,   hyperlipidemia, was admitted with complaints of right flank pain/right upper   quadrant pain and was found to have hyperkalemia with a potassium of 6.1 and   acute renal failure with a creatinine of 2.1 and imaging revealed   cholelithiasis, but no acute cholecystitis.  Patient was originally admitted   on 01/22/2019, at which time medical management was started for her   hyperkalemia; however, her potassium trended up from 6.1-6.9 and thus   nephrology was called.  Patient was initially seen by [facility]ccann ([facility] and she was treated medically for her hyperkalemia with Veltassa   as well as a sodium acetate drip.  Her potassium did eventually come under   control; however, then revealed patient normally follows with Dr. Mon of   [facility] and so Dr. Mccann transferred the patient's care over   to [facility].  Patient reports that at home she had been eating   some high potassium foods lately, namely bananas and some orange juice.  She   also reports that she was recently treated for a urinary tract infection with   an antibiotic that was possibly Bactrim.  She is no longer on antibiotics and   her UA this admission did not seem suggestive of a UTI.  Patient reports that   she is feeling better.  She is no longer having any abdominal pain.  Imaging   did reveal cholelithiasis, but no cholecystitis.  Her baseline creatinine in   the office ranges about 1.6.  She was last seen in 12/2018 by [facility], at which time her creatinine was 1.6.  Etiology of her CKD is    thought to be secondary to hypertension, diabetes mellitus and age atrophy.    Patient denied any recent NSAIDs.  Her creatinine was 2.1 on admission, it is   now improved to 1.9 and has been staying around 1.9 lately.  She reports good   urine output.    REVIEW OF SYSTEMS:  Significant for right upper quadrant/right flank pain that   has now resolved.  She denies any nausea or vomiting.  No lightheadedness or   dizziness.  She denies any lower extremity edema and the rest of the 12-point   review of systems is negative.    PAST MEDICAL HISTORY:  1.  Chronic kidney disease stage III.  Patient is followed by [facility], Dr. Mon.  Baseline creatinine ranges about 1.6.  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  2.  Hypertension.  3.  Diabetes mellitus type 2.  Patient also has neuropathy.  4.  Hyperlipidemia.  5.  Arthritis.  6.  Vitamin D deficiency.    PAST SURGICAL HISTORY:  1.  Back surgery.  2.  Foot surgery.    ALLERGIES:  No known medical allergies.    SOCIAL HISTORY:  Patient denies any history of smoking.  She denies any   alcohol or illicit drug use.    FAMILY HISTORY:  There is no family history of kidney disease or relatives on   dialysis.  She does have a  on dialysis.    CURRENT MEDICATIONS:  1.  Amlodipine 10 mg daily.  2.  Neurontin 300 mg 3 times daily.  3.  Heparin 5000 units subQ every 8 hours.  4.  Lantus insulin.  5.  Humulin regular insulin sliding scale.  6.  Xalatan eye drops.  7.  Prilosec 20 mg daily.  8.  MiraLax.  9.  Senokot.  10.  Veltassa 8.4 g daily has been discontinued today.    PHYSICAL EXAMINATION:  VITAL SIGNS:  Temperature is 36.2 degrees Celsius, pulse 86, respirations 18,   blood pressure 122/61, and satting 90% on room air.  GENERAL:  Patient is alert and oriented x3, in no acute distress.  She does   appear well developed and well nourished.  HEENT:  Pupils are equal, round and  reactive to light.  Extraocular muscles   are intact.  Mucous membranes appear moist.  NECK:  Exam reveals no JVD.  Trachea is midline.  There is no thyromegaly.    CARDIOVASCULAR:  Heart is regular rate and rhythm.  No murmurs, rubs or   gallops.  RESPIRATORY:  Lungs are generally clear to auscultation bilaterally.  No   wheezes, rales or rhonchi.  There is no tachypnea.  There is no increased work   of breathing.  GASTROINTESTINAL:  Abdomen is soft, nontender, nondistended.  Bowel sounds are   present.  EXTREMITIES:  Reveals no clubbing, cyanosis or edema.  SKIN:  Reveals no rashes or ulcerations.  There is normal skin turgor.  NEUROLOGIC:  Reveals no focal motor deficits.  Sensation is grossly intact to   light touch.  LYMPHATIC:  Exam reveals no cervical lymphadenopathy, no axillary   lymphadenopathy.  PSYCHIATRIC:  Patient is alert and oriented x3.  She has an appropriate mood   and affect.    LABORATORY DATA:  Labs reveal a white blood cell count of 6.7, hemoglobin   10.0, and platelet count is 158.  Differential reveals 59% neutrophils, 29%   lymphocytes.  Chemistries reveal a sodium of 133, potassium 5.0, chloride is   98, bicarbonate is 29, BUN is 26, creatinine is 1.93, and calcium is 8.2.  INR   is 0.98.  Urinalysis on 01/22/2019 reveals no occult blood, no protein.    Microscopic exam reveals 5-10 white blood cells per high power field and   negative bacteria.    IMAGING:  CT renal colic on 01/22/2019 reveals no renal stone or   hydronephrosis.  There is normal appendix.  There is cholelithiasis.  There is   mildly prominent aortocaval lymph node in the retroperitoneum, which is   nonspecific and new from prior exam, neoplasm is not excluded.  Right upper   quadrant ultrasound on 01/23/2019 reveals cholelithiasis.  There is no   evidence for acute cholecystitis or biliary obstruction.  There is limited   evaluation of the pancreas.  There is cortical thinning of the right kidney.    Chest x-ray on  01/23/2019 reveals hypoinflation, no focal consolidation or   pleural effusions.  MRI of the abdomen on 01/23/2019 without IV contrast   reveals that there is cholelithiasis.  There is no wall thickening or   pericholecystic fluid, biliary tree is normal.  Pancreas and pancreatic duct   are normal.    ASSESSMENT AND PLAN:  1.  Acute renal failure on chronic kidney disease stage III.  Patient is   followed by Dr. Mon of [facility] as an outpatient.  She was   seen earlier in this hospital course by Dr. Rissa Mccann who then transferred   her care back to [facility].  Patient's baseline creatinine as   an outpatient ranges about 1.6.  She now presented with acute renal failure,   possibly prerenal in etiology or possibly related to recent outpatient   antibiotics (patient may have been on Bactrim recently for urinary tract   infection).  At this time, creatinine has improved, but has not returned back   to baseline.  Encourage p.o. fluids, avoid any IV contrast, nephrotoxins, or   NSAIDs, and dose adjust medications for her decreased glomerular filtration   rate.  2.  Hyperkalemia.  Potassium was 6.1 on admission and then did trend up to   6.9.  Patient was treated medically with sodium acetate drip as well as   Veltassa.  Patient may have been on Bactrim as an outpatient recently and she   also had some increased dietary potassium intake.  Potassium level is now   stable, Veltassa has been discontinued, recommend a low potassium diet.  This   was discussed with the patient by myself.  Continue to monitor.  3.  Hypertension.  Blood pressure had been high, it is now under fair control.    Continue to monitor.  Avoid any ACE inhibitor or ARB at this time.  4.  Diabetes mellitus type 2.  Management per the primary service.  5.  Hyponatremia.  This is mild.  Monitor for now.  6.  Abdominal pain.  This is now resolved.  Imaging showed cholelithiasis, but   no acute cholecystitis.  Continue  supportive care.  7.  Metabolic alkalosis.  Continue to monitor.  Sodium acetate drip which was   ordered earlier on her hospital course has been discontinued.  8.  Anemia.  Recommend checking iron stores.  9.  Frequent urinary tract infections.  UA on this admission is not suggestive   of urinary tract infection.  Patient has been referred to urology as an   outpatient.  10.  Constipation.  Patient did have a bowel movement earlier today.  Veltassa   may contributed to the constipation.  At this time, avoid Fleets enemas in   this chronic kidney disease patient.  Other enemas are okay if needed.    Veltassa has been discontinued.  We will monitor.    Thank you for this very interesting consult and thank you for involving me in   the care of this very pleasant patient.  If you have any questions or need any   further information, please do not hesitate to contact me.       ____________________________________     MD TANIA NOLAN / ADAM    DD:  01/25/2019 22:02:46  DT:  01/26/2019 03:01:57    D#:  [id number]  Job#:  [id number]

## 2019-01-26 NOTE — CARE PLAN
Problem: Bowel/Gastric:  Goal: Normal bowel function is maintained or improved  Outcome: PROGRESSING AS EXPECTED  Patient had BM last night per patient. Will continue to monitor.     Problem: Mobility  Goal: Risk for activity intolerance will decrease  Outcome: PROGRESSING AS EXPECTED  Patient able to ambulate to restroom without any issues, will continue to monitor.

## 2019-01-26 NOTE — CARE PLAN
Problem: Safety  Goal: Will remain free from injury  Outcome: PROGRESSING AS EXPECTED    Intervention: Provide assistance with mobility   01/25/19 2100   OTHER   Assistance / Tolerance Standby Assist;Tolerates Well     Intervention: Collaborate with Interdisciplinary Team for safe transfer and mobilization techniques   01/25/19 2100   OTHER   Assistive Devices Hand held assist   Pt calls appropriately, call light within reach. Bed in lowest and locked position, with hourly rounding in place. Bed alarm on and treaded slipper socks on. Mobility assessed with appropriate signs in place. Hourly rounding in place.          Problem: Mobility  Goal: Risk for activity intolerance will decrease  Outcome: PROGRESSING AS EXPECTED

## 2019-01-26 NOTE — CARE PLAN
Problem: Infection  Goal: Will remain free from infection    Intervention: Implement standard precautions and perform hand washing before and after patient contact  Patient educated on hand hygiene and importance of cleanliness. Patient verbalizes understanding. RN performs appropriate hand hygiene and maintains aseptic technique when in contact with patient.      Problem: Knowledge Deficit  Goal: Knowledge of disease process/condition, treatment plan, diagnostic tests, and medications will improve    Intervention: Explain information regarding disease process/condition, treatment plan, diagnostic tests, and medications and document in education  Patient and family educated on POC, treatment plan, diagnostics tests, medications, diet, and encouraged to ask questions regarding care. Patient and family verbalizes understanding. Reoriented to call light for assistance. Hourly rounding in place.

## 2019-01-26 NOTE — PROGRESS NOTES
Bedside report received from day shift RN, assumed pt care. Pt assessment complete. Pt alert and oriented. Reviewed plan of care with pt. Tele box on and rhythm verified.  Chart and labs reviewed.  Bed in lowest position, call light within reach. Hourly rounding in place. Educated about calling for assistance.

## 2019-01-26 NOTE — PROGRESS NOTES
[facility] Medicine Daily Progress Note    Date of Service  1/25/2019    Chief Complaint  73 y.o. female admitted 1/22/2019 with Abdominal pain    Hospital Course    Ms Hernandez has a history of hypertension and chronic kidney disease.  Prior to the mid admission the patient had been treated with antibiotics for a urinary tract infection.  She developed abdominal pain and was came to the emergency room on 1/20/2019.  Evaluation included labs showing hyperkalemia, worsened renal failure, and elevated lipase.  She was admitted to the hospital.  Repeat labs demonstrated worsening hyperkalemia, she was transferred to the ICU on 1/23/2019.  Patient was seen in consultation by nephrology, she was treated with a bicarb drip and Veltassa.  Potassium was improved, her abdominal pain is also improving, MRCP does not show any evidence of biliary obstruction.      Interval Problem Update  Seen and evaluated on rounds  She has been transferred out of the ICU  No complaints apart from constipation  Magnesium citrate advised    Consultants/Specialty  Nephrology  Pulmonology / Critical      Code Status  Full Code    Disposition  Anticipate discharge home tomorrow     Review of Systems  Review of Systems   Constitutional: Negative for chills, diaphoresis, fever and malaise/fatigue.   HENT: Negative for ear pain, hearing loss and tinnitus.    Eyes: Negative for blurred vision and double vision.   Respiratory: Negative for cough, hemoptysis, sputum production, shortness of breath and wheezing.    Cardiovascular: Negative for chest pain, palpitations, orthopnea and PND.   Gastrointestinal: Positive for constipation. Negative for abdominal pain, blood in stool, diarrhea, heartburn, melena, nausea and vomiting.   Genitourinary: Negative for dysuria, flank pain, frequency, hematuria and urgency.   Musculoskeletal: Negative for back pain, falls, joint pain, myalgias and neck pain.   Skin: Negative for itching and rash.   Neurological: Negative  for dizziness, tingling, tremors, sensory change, speech change, focal weakness, seizures, loss of consciousness, weakness and headaches.   Psychiatric/Behavioral: Negative for depression, hallucinations, memory loss, substance abuse and suicidal ideas. The patient is not nervous/anxious and does not have insomnia.         Physical Exam  Temp:  [36.1 °C (97 °F)-37.4 °C (99.3 °F)] 36.2 °C (97.2 °F)  Pulse:  [76-95] 80  Resp:  [16-19] 16  BP: (112-155)/(49-72) 144/72  SpO2:  [90 %-95 %] 92 %    Physical Exam   Constitutional: She is oriented to person, place, and time. She appears well-developed and well-nourished.   HENT:   Head: Normocephalic and atraumatic.   Eyes: Conjunctivae and EOM are normal. Right eye exhibits no discharge. Left eye exhibits no discharge.   Neck: Normal range of motion. Neck supple.   Cardiovascular: Normal rate, regular rhythm and intact distal pulses.    No murmur heard.  2+ Radial Pulses  Brisk Capillary Refill   Pulmonary/Chest: Effort normal and breath sounds normal. No respiratory distress. She has no wheezes.   Abdominal: Soft. Bowel sounds are normal. She exhibits no distension. There is no tenderness. There is no rebound and no guarding.   Musculoskeletal: Normal range of motion. She exhibits no edema.   Neurological: She is alert and oriented to person, place, and time. No cranial nerve deficit.   Skin: Skin is warm and dry. She is not diaphoretic. No erythema.   Skin is warm and well perfused   Psychiatric: She has a normal mood and affect.       Fluids    Intake/Output Summary (Last 24 hours) at 01/25/19 1825  Last data filed at 01/25/19 1300   Gross per 24 hour   Intake              600 ml   Output              950 ml   Net             -350 ml       Laboratory  Recent Labs      01/22/19   1854  01/23/19   0800  01/24/19   0155   WBC  6.2  5.1  6.7   RBC  3.56*  3.89*  3.27*   HEMOGLOBIN  11.2*  11.9*  10.0*   HEMATOCRIT  33.5*  37.4  30.3*   MCV  94.1  96.1  92.7   MCH  31.5   30.6  30.6   MCHC  33.4*  31.8*  33.0*   RDW  43.8  45.3  42.5   PLATELETCT  163*  178  158*   MPV  10.2  10.1  10.1     Recent Labs      01/24/19   0400  01/24/19   0540  01/25/19   0316   SODIUM  132*  133*  133*   POTASSIUM  4.4  4.6  5.0   CHLORIDE  98  98  98   CO2  29  30  29   GLUCOSE  76  62*  184*   BUN  27*  27*  26*   CREATININE  1.97*  1.94*  1.93*   CALCIUM  9.0  9.1  8.2*     Recent Labs      01/23/19   0800   APTT  35.0   INR  0.98               Imaging  [id number]   Final Result      1.  There is cholelithiasis. There is no wall thickening or pericholecystic fluid.   2.  Biliary tree is normal.   3.  Pancreas and pancreatic duct are normal.      DX-CHEST-PORTABLE (1 VIEW)   Final Result      Hypoinflation. No focal consolidation or pleural effusions.      IR-MIDLINE CATHETER INSERTION W/ GUIDANCE > AGE 5   Final Result                  Ultrasound-guided midline placement performed by qualified nursing staff    as above.          US-RUQ   Final Result      1.  Cholelithiasis.   2.  No evidence for acute cholecystitis or biliary obstruction.   3.  Limited evaluation of pancreas.   4.  Cortical thinning of RIGHT kidney.      CT-RENAL COLIC EVALUATION(A/P W/O)   Final Result      1.  No renal stone or hydronephrosis.   2.  Normal appendix.   3.  Cholelithiasis.   4.  Mildly prominent aortocaval lymph node in the retroperitoneum, nonspecific and new from prior exam.  Neoplasm is not excluded.           Assessment/Plan  Hyperkalemia- (present on admission)   Assessment & Plan    Suspected that the patient was taking bactrim prior to admission, she also has chronic kidney disease  Monitor  Constipation relief  Nephrology following      Acute on chronic kidney failure (HCC)- (present on admission)   Assessment & Plan    CKD stage III at baseline  No hydronephrosis on CT renal  Potassium improved, no need for dialysis  Repeat labs AM  Nephrology following      Elevated TSH- (present on admission)    Assessment & Plan    Repeat TSH in 2 weeks with PCP     Essential hypertension- (present on admission)   Assessment & Plan    Started on amlodipine, avoid ACE inhibitor/ARB     RUQ pain- (present on admission)   Assessment & Plan    Improved/resolved  ?constipation, ?passed gallstone  Monitor for recurrence of symptoms  Constipation relief     Hyponatremia- (present on admission)   Assessment & Plan    Stable, monitor      Elevated lipase- (present on admission)   Assessment & Plan    Clinical picture not consistent with pancreatitis  Non specific lipase elevation of undetermined significance  Pain improved     Type 2 diabetes mellitus with hypoglycemia, with long-term current use of insulin (HCC)- (present on admission)   Assessment & Plan    Lantus 5   SSI 2  Check 2 am blood sugars  Hypoglycemic protocol          VTE prophylaxis: Heparin

## 2019-01-26 NOTE — DISCHARGE SUMMARY
Discharge Summary    CHIEF COMPLAINT ON ADMISSION  Chief Complaint   Patient presents with   • Flank Pain       Reason for Admission  Back Pain     Admission Date  1/22/2019    CODE STATUS  Full Code    HPI & HOSPITAL COURSE  This is a 73 y.o. female here with flank pain.     Patient has underlying history known for hypertension, chronic kidney disease for which she follows with [facility] nephrology.  She also has underlying history of gastroesophageal reflux disease, diabetes mellitus type 2, vertigo, anemia of renal disease and glaucoma.  Presented with abdominal pain, prior to presentation it appears that she was treated with Bactrim and on presentation she was noted to have hyperkalemia with potassium of 6.1.  CT renal was obtained which was negative for any acute concerns.  Ultrasound right upper quadrant did not reveal any concerns either.  She was subsequently noted to have worsening hyperkalemia, potassium levels jumped up to 6.9 requiring emergent medical treatment and transferred to the ICU for close monitoring.  Her potassium levels improved with ongoing medical management.  Regarding her abdominal pain, MRCP was obtained which was negative but revealed cholelithiasis and no other acute concerns.  During her hospitalization, her abdominal pain completely resolved.  She was noted to have constipation, with constipation relief she further had significant resolution of her hyperkalemia.  She was seen by nephrology during her hospitalization.  She is on lisinopril, she is advised to stop lisinopril and advised to discuss with her outpatient providers complications of hyperkalemia with medications.  She started on amlodipine for blood pressure control.  She is very eager to discharge home at this point in time, advise close outpatient follow-up with her primary care physician and nephrologist.  Advised blood pressure control and type 2 diabetes mellitus controlled per her primary care physician.   Schedulers informed to arrange outpatient follow-up with primary care physician.  She is discharged home in stable condition.       Therefore, she is discharged in good and stable condition to home with close outpatient follow-up.    The patient met 2-midnight criteria for an inpatient stay at the time of discharge.    Discharge Date  01/26/19    FOLLOW UP ITEMS POST DISCHARGE  Follow-up with your PCP and your nephrologist.  You are noted to have increased potassium levels.  Discussed with your outpatient providers, this finding and tell them to avoid using medication which can increase your potassium levels.  Lisinopril can cause increased potassium levels, this is stopped.  For blood pressure control you are started on amlodipine.  Decrease your potassium intake in diet.  Avoid constipation, use MiraLAX daily.  Discuss blood pressure control and blood sugar control with your primary care physician.  Medication changes as reviewed below.  Monitor your blood pressure and blood sugars at home regularly and share the results with your primary care physician.  Have your primary care physician monitor kidney function/electrolyte levels in 3 days of hospital discharge.    DISCHARGE DIAGNOSES  Active Problems:    Hyperkalemia POA: Yes    Acute on chronic kidney failure (HCC) POA: Yes    Type 2 diabetes mellitus with hypoglycemia, with long-term current use of insulin (HCC) POA: Yes    Elevated lipase POA: Yes    Hyponatremia POA: Yes    RUQ pain POA: Yes    Essential hypertension POA: Yes    Elevated TSH POA: Yes  Resolved Problems:    * No resolved hospital problems. *      FOLLOW UP  No future appointments.  No follow-up provider specified.    MEDICATIONS ON DISCHARGE     Medication List      START taking these medications      Instructions   amLODIPine 10 MG Tabs  Start taking on:  1/27/2019  Commonly known as:  NORVASC   Take 1 Tab by mouth every day.  Dose:  10 mg     polyethylene glycol/lytes Pack  Commonly known  as:  MIRALAX   Take 1 Packet by mouth 2 Times a Day.  Dose:  17 g        CONTINUE taking these medications      Instructions   aspirin EC 81 MG Tbec  Commonly known as:  ECOTRIN   Take 81 mg by mouth every day.  Dose:  81 mg     [employer] OYSTER SHELL CALCIUM-VIT D 500-200 MG-UNIT Tabs  Generic drug:  calcium carbonate-vitamin D   Take 1 Tab by mouth 2 Times a Day.  Dose:  1 Tab     ferrous sulfate 325 (65 Fe) MG tablet   Take 325 mg by mouth 2 Times a Day.  Dose:  325 mg     gabapentin 300 MG Caps  Commonly known as:  NEURONTIN   Take 300 mg by mouth 3 times a day.  Dose:  300 mg     insulin aspart 100 UNIT/ML Soln  Commonly known as:  NOVOLOG   Inject 5-10 Units as instructed 3 times a day before meals. Depending on blood sugar  Dose:  5-10 Units     insulin detemir 100 UNIT/ML Soln  Commonly known as:  LEVEMIR   Inject  as instructed every morning. Depends on blood sugar     meclizine 25 MG Tabs  Commonly known as:  ANTIVERT   Take 25 mg by mouth 3 times a day.  Dose:  25 mg     omeprazole 20 MG delayed-release capsule  Commonly known as:  PRILOSEC   Take 20 mg by mouth every morning.  Dose:  20 mg     raNITidine 150 MG Tabs  Commonly known as:  ZANTAC   Take 150 mg by mouth as needed.  Dose:  150 mg     vitamin D 2000 UNIT Tabs   Take 2,000 Units by mouth every day.  Dose:  2000 Units     XALATAN 0.005 % Soln  Generic drug:  latanoprost   Place 1 Drop in both eyes every evening.  Dose:  1 Drop        STOP taking these medications    glimepiride 2 MG Tabs  Commonly known as:  AMARYL     lisinopril 40 MG tablet  Commonly known as:  PRINIVIL, ZESTRIL            Allergies  No Known Allergies    DIET  Orders Placed This Encounter   Procedures   • Diet Order Diabetic, Regular     Standing Status:   Standing     Number of Occurrences:   1     Order Specific Question:   Diet:     Answer:   Diabetic [3]     Order Specific Question:   Diet:     Answer:   Regular [1]       ACTIVITY  As tolerated.  Weight bearing as  tolerated    CONSULTATIONS  Nephrology  Pulmonology / Critical Care     PROCEDURES  None    LABORATORY  Lab Results   Component Value Date    SODIUM 135 01/26/2019    POTASSIUM 4.4 01/26/2019    CHLORIDE 100 01/26/2019    CO2 28 01/26/2019    GLUCOSE 69 01/26/2019    BUN 27 (H) 01/26/2019    CREATININE 1.82 (H) 01/26/2019    CREATININE 0.9 08/09/2007        Lab Results   Component Value Date    WBC 6.7 01/24/2019    HEMOGLOBIN 10.0 (L) 01/24/2019    HEMATOCRIT 30.3 (L) 01/24/2019    PLATELETCT 158 (L) 01/24/2019        Total time of the discharge process exceeds 33 minutes.

## 2019-01-26 NOTE — PROGRESS NOTES
0715: Report received from LATOYA John. Patient resting in bed at this time. Vitals wnl, fall precautions in place, call light within reach. Bed alarm on. Will continue to monitor.     Discharge instructions given and discussed, Signed copy in chart. Patient verbalizes understanding with all questions answered. Two prescriptions sent to patients pharmacy and pt instructed to . Pt discharged to home. In stable condition, via wheelchair. Escorted by CNA/ granddaughter. Personal belongings returned to patient. IV removed and tolerated well. Telemetry box removed and monitor room notified.

## 2019-01-26 NOTE — DISCHARGE INSTRUCTIONS
Follow-up with your PCP and your nephrologist.  You are noted to have increased potassium levels.  Discussed with your outpatient providers, this finding and tell them to avoid using medication which can increase your potassium levels.  Lisinopril can cause increased potassium levels, this is stopped.  For blood pressure control you are started on amlodipine.  Decrease your potassium intake in diet.  Avoid constipation, use MiraLAX daily.  Discuss blood pressure control and blood sugar control with your primary care physician.  Medication changes as reviewed below.  Monitor your blood pressure and blood sugars at home regularly and share the results with your primary care physician.  Have your primary care physician monitor kidney function/electrolyte levels in 3 days of hospital discharge.  Discharge Instructions    Discharged to home by car with relative. Discharged via wheelchair, hospital escort: Yes.  Special equipment needed: Not Applicable    Be sure to schedule a follow-up appointment with your primary care doctor or any specialists as instructed.     Discharge Plan:   Diet Plan: Discussed  Activity Level: Discussed  Confirmed Follow up Appointment: Appointment Scheduled  Confirmed Symptoms Management: Discussed  Medication Reconciliation Updated: Yes  Influenza Vaccine Indication: Not indicated: Previously immunized this influenza season and > 8 years of age    I understand that a diet low in cholesterol, fat, and sodium is recommended for good health. Unless I have been given specific instructions below for another diet, I accept this instruction as my diet prescription.   Other diet: renal, low potassium     Special Instructions: None    · Is patient discharged on Warfarin / Coumadin?   No       Hyperkalemia  Introduction  Hyperkalemia is when you have too much potassium in your blood. Potassium is normally removed (excreted) from your body by your kidneys. If there is too much potassium in your blood, it  can affect how your heart works.  Follow these instructions at home:  · Take medicines only as told by your doctor.  · Do not take any supplements, natural products, herbs, or vitamins unless your doctor says it is okay.  · Limit your alcohol intake as told by your doctor.  · Stop illegal drug use. If you need help quitting, ask your doctor.  · Keep all follow-up visits as told by your doctor. This is important.  · If you have kidney disease, you may need to follow a low potassium diet. A  (dietitian) can help you.  Contact a doctor if:  · Your heartbeat is not regular or very slow.  · You feel dizzy (light-headed).  · You feel weak.  · You feel sick to your stomach (nauseous).  · You have tingling in your hands or feet.  · You cannot feel your hands or feet.  Get help right away if:  · You are short of breath.  · You have chest pain.  · You pass out (faint).  · You cannot move your muscles.  This information is not intended to replace advice given to you by your health care provider. Make sure you discuss any questions you have with your health care provider.  Document Released: 12/18/2006 Document Revised: 05/25/2017 Document Reviewed: 03/25/2015  © 2017 [employer]    Acute Kidney Injury, Adult  Acute kidney injury (PRAMOD) occurs when there is sudden (acute) damage to the kidneys. A small amount of kidney damage may not cause problems, but a large amount of damage may make it difficult or impossible for the kidneys to work the way they should. PRAMOD may develop into long-lasting (chronic) kidney disease. Early detection and treatment of PRAMOD may prevent kidney damage from becoming permanent or getting worse.  What are the causes?  Common causes of this condition include:  · A problem with blood flow to the kidneys. This may be caused by:  ¨ Blood loss.  ¨ Heart and blood vessel (cardiovascular) disease.  ¨ Severe burns.  ¨ Liver disease.  · Direct damage to the kidneys. This may be caused by:  ¨ Some  medicines.  ¨ A kidney infection.  ¨ Poisoning.  ¨ Being around or in contact with poisonous (toxic) substances.  ¨ A surgical wound.  ¨ A hard, direct force to the kidney area.  · A sudden blockage of urine flow. This may be caused by:  ¨ Cancer.  ¨ Kidney stones.  ¨ Enlarged prostate in males.  What are the signs or symptoms?  Symptoms develop slowly and may not be obvious until the kidney damage becomes severe. It is possible to have PRAMOD for years without showing any symptoms. Symptoms of this condition can include:  · Swelling (edema) of the face, legs, ankles, or feet.  · Numbness, tingling, or loss of feeling (sensation) in the hands or feet.  · Tiredness (lethargy).  · Nausea or vomiting.  · Confusion or trouble concentrating.  · Problems with urination, such as:  ¨ Painful or burning feeling during urination.  ¨ Decreased urine production.  ¨ Frequent urination, especially at night.  ¨ Bloody urine.  · Muscle twitches and cramps, especially in the legs.  · Shortness of breath.  · Weakness.  · Constant itchiness.  · Loss of appetite.  · Metallic taste in the mouth.  · Trouble sleeping.  · Pale lining of the eyelids and surface of the eye (conjunctiva).  How is this diagnosed?  This condition may be diagnosed with various tests. Tests may include:  · Blood tests.  · Urine tests.  · Imaging tests.  · A test in which a sample of tissue is removed from the kidneys to be looked at under a microscope (kidney biopsy).  How is this treated?  Treatment of PRAMOD varies depending on the cause and severity of the kidney damage. In mild cases, treatment may not be needed. The kidneys may heal on their own.  If PRAMOD is more severe, your health care provider will treat the cause of the kidney damage, help the kidneys heal, and prevent problems from occurring. Severe cases may require a procedure to remove toxic wastes from the body (dialysis) or surgery to repair kidney damage. Surgery may involve:  · Repair of a torn  kidney.  · Removal of a urine flow obstruction.  Follow these instructions at home:  · Follow your prescribed diet.  · Take over-the-counter and prescription medicines only as told by your health care provider.  ¨ Do not take any new medicines unless approved by your health care provider. Many medicines can worsen your kidney damage.  ¨ Do not take any vitamin and mineral supplements unless approved by your health care provider. Many nutritional supplements can worsen your kidney damage.  ¨ The dose of some medicines that you take may need to be adjusted.  · Do not use any tobacco products, such as cigarettes, chewing tobacco, and e-cigarettes. If you need help quitting, ask your health care provider.  · Keep all follow-up visits as told by your health care provider. This is important.  · Keep track of your blood pressure. Report changes in your blood pressure as told by your health care provider.  · Achieve and maintain a healthy weight. If you need help with this, ask your health care provider.  · Start or continue an exercise plan. Try to exercise at least 30 minutes a day, 5 days a week.  · Stay current with immunizations as told by your health care provider.  Where to find more information:  · American Association of Kidney Patients: www.aakp.org  · National Kidney Foundation: www.kidney.org  · American Kidney Fund: www.akfinc.org  · [employer] [facility]: www.lifeoptions.org and www.kidneyschool.org  Contact a health care provider if:  · Your symptoms get worse.  · You develop new symptoms.  Get help right away if:  · You develop symptoms of end-stage kidney disease, which include:  ¨ Headaches.  ¨ Abnormally dark or light skin.  ¨ Numbness in the hands or feet.  ¨ Easy bruising.  ¨ Frequent hiccups.  ¨ Chest pain.  ¨ Shortness of breath.  ¨ End of menstruation in women.  · You have a fever.  · You have decreased urine production.  · You have pain or bleeding when you urinate.  This information  is not intended to replace advice given to you by your health care provider. Make sure you discuss any questions you have with your health care provider.  Document Released: 07/02/2012 Document Revised: 07/27/2017 Document Reviewed: 08/16/2013  [employer] Interactive Patient Education © 2017 [employer]  Renal Diet  Dialysis is a treatment that you may undergo if you have significant damage to your kidneys. Dialysis replaces some of the work that the kidneys do. One of the jobs that it takes over is removing wastes, salt, and extra water from your blood. This helps to keep the amount of potassium and other nutrients in your blood at healthy levels.  When you need dialysis, it is important to pay careful attention to your diet. Between dialysis sessions, certain nutrients can build up in your blood and cause you to get sick. Vitamins and minerals are an important part of a healthy diet and should not be avoided entirely. However, it is commonly recommended that you limit your intake of potassium, phosphorus, and sodium. It may also be necessary to restrict other nutrients, such as carbohydrates or fat, if you have other health conditions. Your health care provider or dietitian can help you to determine the amount of these nutrients that is right for you.  WHAT IS MY PLAN?  Your dietitian will help you to design a meal plan that is specific to your needs. Generally, meal plans include:  · Grains, 6-11 servings per day. One serving is equal to 1 slice of bread or ½ cup of cooked rice or pasta.  · Low-potassium vegetables, 2-3 servings per day. One serving is equal to ½ cup.  · Low-potassium fruits, 2-3 servings per day. One serving is equal to ½ cup.  · Meats and other protein sources, 8-11 oz per day.  · Dairy, ½ cup per day.  Your dietitian will provide you with specific instructions about the amount of fluids you can have each day.  WHAT DO I NEED TO KNOW ABOUT A DIALYSIS DIET?  · Limit your intake of potassium.  Potassium is found in milk, fruits, and vegetables.  · Limit your intake of phosphorus. Phosphorus is found in milk, cheese, beans, nuts, and carbonated beverages. Avoid whole-grain and high-fiber foods because they contain high amounts of phosphorus.  · Limit your intake of sodium. Foods that are high in sodium include processed and cured meats, ready-made frozen meals, canned vegetables, and salty snack foods. Do not use salt substitutes because they contain potassium.  · If you were instructed to restrict your fluid intake, follow your health care provider's specific instructions. You may be told to:  ¨ Write down what you drink and any foods you eat that are made mostly from water, such as gelatin and soups.  ¨ Drink from small cups to help control how much you drink.  · Ask your health care provider if you should regularly take an over-the-counter medicine that binds phosphorus, such as antacid products that contain calcium carbonate.  · Take vitamin and mineral supplements only as directed by your health care provider.  · Eat high-quality proteins, such as meat, poultry, fish, and eggs. Limit low-quality plant-based proteins, such as nuts and beans.  · Cut potatoes into small pieces and boil them in unsalted water before you eat them. This can help to remove some potassium from the potato.  · Drain all fluid from cooked vegetables and canned fruits before eating them.  WHAT FOODS CAN I EAT?  Grains  White bread. White rice. Cooked cereal. Unsalted popcorn. Tortillas. Pasta.  Vegetables  Fresh or frozen broccoli, carrots, and green beans. Cabbage. Cauliflower. Celery. Cucumbers. Eggplant. Radishes. Zucchini.  Fruits  Apples. Fresh or frozen berries. Fresh or canned pears, peaches, and pineapple. Grapes. Plums.  Meats and Other Protein Sources  Fresh or frozen beef, pork, chicken, and fish. Eggs. Low-sodium canned tuna or salmon.  Dairy  Cream cheese. Heavy cream. Ricotta cheese.  Beverages  Apple cider.  Cranberry juice. Grape juice. Lemonade. Black coffee.  Condiments  Herbs. Spices. Jam and jelly. Honey.  Sweets and Desserts  Sherbet. Cakes. Cookies.  Fats and Oils  Olive oil, canola oil, and safflower oil.  Other  Non-dairy creamer. Non-dairy whipped topping. Homemade broth without salt.  The items listed above may not be a complete list of recommended foods or beverages. Contact your dietitian for more options.   WHAT FOODS ARE NOT RECOMMENDED?  Grains  Whole-grain bread. Whole-grain pasta. High-fiber cereal.  Vegetables  Potatoes. Beets. Tomatoes. Winter squash and pumpkin. Asparagus. Spinach. Parsnips.  Fruits  Star fruit. Bananas. Oranges. Kiwi. Nectarines. Prunes. Melon. Dried fruit. Avocado.  Meats and Other Protein Sources  Canned, smoked, and cured meats. Packaged luncheon meat. Sardines. Nuts and seeds. Peanut butter. Beans and legumes.  Dairy  Milk. Buttermilk. Yogurt. Cheese and cottage cheese. Processed cheese spreads.  Beverages  Orange juice. Prune juice. Carbonated soft drinks.  Condiments  Salt. Salt substitutes. Soy sauce.  Sweets and Desserts  Ice cream. Chocolate. Candied nuts.  Fats and Oils  Butter. Margarine.  Other  Ready-made frozen meals. Canned soups.  The items listed above may not be a complete list of foods and beverages to avoid. Contact your dietitian for more information.     This information is not intended to replace advice given to you by your health care provider. Make sure you discuss any questions you have with your health care provider.     Document Released: 09/14/2005 Document Revised: 01/08/2016 Document Reviewed: 07/21/2015  [employer] Interactive Patient Education ©2016 [employer]      Depression / Suicide Risk    As you are discharged from this [facility] facility, it is important to learn how to keep safe from harming yourself.    Recognize the warning signs:  · Abrupt changes in personality, positive or negative- including increase in energy   · Giving away  possessions  · Change in eating patterns- significant weight changes-  positive or negative  · Change in sleeping patterns- unable to sleep or sleeping all the time   · Unwillingness or inability to communicate  · Depression  · Unusual sadness, discouragement and loneliness  · Talk of wanting to die  · Neglect of personal appearance   · Rebelliousness- reckless behavior  · Withdrawal from people/activities they love  · Confusion- inability to concentrate     If you or a loved one observes any of these behaviors or has concerns about self-harm, here's what you can do:  · Talk about it- your feelings and reasons for harming yourself  · Remove any means that you might use to hurt yourself (examples: pills, rope, extension cords, firearm)  · Get professional help from the community (Mental Health, Substance Abuse, psychological counseling)  · Do not be alone:Call your Safe Contact- someone whom you trust who will be there for you.  · Call your local CRISIS HOTLINE 608-0042 or 535-133-6518  · Call your local Children's Mobile Crisis Response Team [place] (978) 819-3783 or www.[place]  · Call the toll free National Suicide Prevention Hotlines   · National Suicide Prevention Lifeline 488-126-SGUH (9042)  · National Hope Line Network 800-SUICIDE (545-5161)

## 2019-01-28 ENCOUNTER — PATIENT OUTREACH (OUTPATIENT)
Dept: HEALTH INFORMATION MANAGEMENT | Facility: OTHER | Age: 74
End: 2019-01-28

## 2019-01-28 ENCOUNTER — TELEPHONE (OUTPATIENT)
Dept: HEALTH INFORMATION MANAGEMENT | Facility: OTHER | Age: 74
End: 2019-01-28

## 2019-01-29 NOTE — TELEPHONE ENCOUNTER
Dear Dr. Najjar,     Patient has upcoming appointment scheduled with you on 1/30/19. During medication review it is noted that patient is taking gabapentin 300 mg TID. Recommended renal dosing in patients with CrCl of 15-29 mL/min is 200 mg to 700 mg once daily. Patient's most recent calculated CrCl is 28 mL/min. Please advise.     Thank you,   Mojgan Hong, PharmD ext [phone]

## 2019-01-30 ENCOUNTER — OFFICE VISIT (OUTPATIENT)
Dept: NEPHROLOGY | Facility: MEDICAL CENTER | Age: 74
End: 2019-01-30
Payer: MEDICARE

## 2019-01-30 VITALS
WEIGHT: 177 LBS | RESPIRATION RATE: 14 BRPM | HEART RATE: 78 BPM | SYSTOLIC BLOOD PRESSURE: 130 MMHG | HEIGHT: 65 IN | TEMPERATURE: 98.2 F | OXYGEN SATURATION: 96 % | BODY MASS INDEX: 29.49 KG/M2 | DIASTOLIC BLOOD PRESSURE: 58 MMHG

## 2019-01-30 DIAGNOSIS — Z79.4 TYPE 2 DIABETES MELLITUS WITH STAGE 4 CHRONIC KIDNEY DISEASE, WITH LONG-TERM CURRENT USE OF INSULIN (HCC): ICD-10-CM

## 2019-01-30 DIAGNOSIS — I10 ESSENTIAL HYPERTENSION: ICD-10-CM

## 2019-01-30 DIAGNOSIS — N18.4 TYPE 2 DIABETES MELLITUS WITH STAGE 4 CHRONIC KIDNEY DISEASE, WITH LONG-TERM CURRENT USE OF INSULIN (HCC): ICD-10-CM

## 2019-01-30 DIAGNOSIS — N18.4 CKD (CHRONIC KIDNEY DISEASE) STAGE 4, GFR 15-29 ML/MIN (HCC): ICD-10-CM

## 2019-01-30 DIAGNOSIS — E11.22 TYPE 2 DIABETES MELLITUS WITH STAGE 4 CHRONIC KIDNEY DISEASE, WITH LONG-TERM CURRENT USE OF INSULIN (HCC): ICD-10-CM

## 2019-01-30 PROCEDURE — 99202 OFFICE O/P NEW SF 15 MIN: CPT | Performed by: INTERNAL MEDICINE

## 2019-01-30 ASSESSMENT — ENCOUNTER SYMPTOMS
COUGH: 0
SHORTNESS OF BREATH: 0
CHILLS: 0
HYPERTENSION: 1
FEVER: 0
TINGLING: 1
VOMITING: 0
NAUSEA: 0

## 2019-01-30 NOTE — PROGRESS NOTES
"Subjective:      Pam Hernandez is a 73 y.o. female who presents with Hypertension and Chronic Kidney Disease            Hypertension   This is a chronic problem. The current episode started more than 1 year ago. The problem is unchanged. The problem is controlled. Pertinent negatives include no chest pain, malaise/fatigue, peripheral edema or shortness of breath. Risk factors for coronary artery disease include diabetes mellitus and post-menopausal state. Past treatments include calcium channel blockers. The current treatment provides significant improvement. There are no compliance problems.  Hypertensive end-organ damage includes kidney disease. Identifiable causes of hypertension include chronic renal disease.   Chronic Kidney Disease   This is a chronic problem. The current episode started more than 1 year ago. The problem occurs constantly. The problem has been unchanged. Pertinent negatives include no chest pain, chills, coughing, fever, nausea, urinary symptoms or vomiting.       Review of Systems   Constitutional: Negative for chills, fever and malaise/fatigue.   Respiratory: Negative for cough and shortness of breath.    Cardiovascular: Negative for chest pain and leg swelling.   Gastrointestinal: Negative for nausea and vomiting.   Genitourinary: Negative for dysuria, frequency and urgency.   Neurological: Positive for tingling.          Objective:     /58 (BP Location: Right arm, Patient Position: Sitting, BP Cuff Size: Adult)   Pulse 78   Temp 36.8 °C (98.2 °F) (Temporal)   Resp 14   Ht 1.651 m (5' 5\")   Wt 80.3 kg (177 lb)   LMP  (LMP Unknown)   SpO2 96%   BMI 29.45 kg/m²      Physical Exam   Constitutional: She is oriented to person, place, and time. She appears well-developed and well-nourished. No distress.   HENT:   Head: Normocephalic and atraumatic.   Right Ear: External ear normal.   Left Ear: External ear normal.   Nose: Nose normal.   Eyes: Conjunctivae are normal. Right eye " exhibits no discharge. Left eye exhibits no discharge. No scleral icterus.   Cardiovascular: Normal rate and regular rhythm.    Pulmonary/Chest: Effort normal and breath sounds normal. No respiratory distress.   Musculoskeletal: She exhibits no edema.   Neurological: She is alert and oriented to person, place, and time.   Skin: Skin is warm. She is not diaphoretic.   Psychiatric: She has a normal mood and affect. Her behavior is normal.   Nursing note and vitals reviewed.              Assessment/Plan:     1. Essential hypertension  Blood pressure is controlled  Continue low-sodium diet    2. CKD (chronic kidney disease) stage 4, GFR 15-29 ml/min (Formerly [facility])  Stable  No uremic symptoms  Renal dose of medication  Avoid nephrotoxins    3. Type 2 diabetes mellitus with stage 4 chronic kidney disease, with long-term current use of insulin (Formerly [facility])    4.  Left side facial tingling  No clear etiology  I have recommended to the patient to go to the emergency room, I did explain that it might be early sign of stroke.

## 2019-01-31 ENCOUNTER — PATIENT OUTREACH (OUTPATIENT)
Dept: HEALTH INFORMATION MANAGEMENT | Facility: OTHER | Age: 74
End: 2019-01-31

## 2019-02-25 ENCOUNTER — PATIENT OUTREACH (OUTPATIENT)
Dept: HEALTH INFORMATION MANAGEMENT | Facility: OTHER | Age: 74
End: 2019-02-25

## 2019-03-06 NOTE — PROGRESS NOTES
Patient Pam Hernandez was discharge on 1/26/2019 for Hypekalemia. IHD Patient Advocate assisted with multiple discharge order including four appointments: 1- Nephrology and 3-Primary Care Physician follow up with Will Gonsalez Patient Kept both of these two appointments. Patient did not follow up with her PCP and declined IHD assistance on scheduled sooner follow up appointment. The patient has 2 future appointments scheduled: 1- Oncology and 1- Primary Care Physician appointment. PPS 70%.

## 2019-03-14 ENCOUNTER — HOSPITAL ENCOUNTER (OUTPATIENT)
Dept: LAB | Facility: MEDICAL CENTER | Age: 74
End: 2019-03-14
Attending: PHYSICIAN ASSISTANT
Payer: MEDICARE

## 2019-03-14 PROCEDURE — 87186 SC STD MICRODIL/AGAR DIL: CPT

## 2019-03-14 PROCEDURE — 87077 CULTURE AEROBIC IDENTIFY: CPT

## 2019-03-14 PROCEDURE — 87086 URINE CULTURE/COLONY COUNT: CPT

## 2019-03-15 LAB
AMBIGUOUS DTTM AMBI4: NORMAL
SIGNIFICANT IND 70042: NORMAL
SITE SITE: NORMAL
SOURCE SOURCE: NORMAL

## 2019-03-18 ENCOUNTER — HOSPITAL ENCOUNTER (OUTPATIENT)
Dept: RADIOLOGY | Facility: MEDICAL CENTER | Age: 74
End: 2019-03-18
Attending: FAMILY MEDICINE
Payer: MEDICARE

## 2019-03-18 DIAGNOSIS — M79.662 PAIN IN BOTH LOWER LEGS: ICD-10-CM

## 2019-03-18 DIAGNOSIS — M79.661 PAIN IN BOTH LOWER LEGS: ICD-10-CM

## 2019-03-18 PROCEDURE — 93970 EXTREMITY STUDY: CPT

## 2019-04-10 ENCOUNTER — APPOINTMENT (OUTPATIENT)
Dept: RADIOLOGY | Facility: MEDICAL CENTER | Age: 74
End: 2019-04-10
Attending: EMERGENCY MEDICINE
Payer: MEDICARE

## 2019-04-10 ENCOUNTER — HOSPITAL ENCOUNTER (OUTPATIENT)
Facility: MEDICAL CENTER | Age: 74
End: 2019-04-11
Attending: EMERGENCY MEDICINE | Admitting: HOSPITALIST
Payer: MEDICARE

## 2019-04-10 DIAGNOSIS — R40.4 TRANSIENT ALTERATION OF AWARENESS: ICD-10-CM

## 2019-04-10 DIAGNOSIS — R47.81 SLURRED SPEECH: ICD-10-CM

## 2019-04-10 DIAGNOSIS — E16.2 HYPOGLYCEMIA: ICD-10-CM

## 2019-04-10 PROBLEM — E03.9 HYPOTHYROIDISM: Status: ACTIVE | Noted: 2019-04-10

## 2019-04-10 LAB
ALBUMIN SERPL BCP-MCNC: 4.2 G/DL (ref 3.2–4.9)
ALBUMIN/GLOB SERPL: 1.3 G/DL
ALP SERPL-CCNC: 105 U/L (ref 30–99)
ALT SERPL-CCNC: 8 U/L (ref 2–50)
ANION GAP SERPL CALC-SCNC: 9 MMOL/L (ref 0–11.9)
APPEARANCE UR: CLEAR
AST SERPL-CCNC: 23 U/L (ref 12–45)
BACTERIA #/AREA URNS HPF: ABNORMAL /HPF
BASOPHILS # BLD AUTO: 1 % (ref 0–1.8)
BASOPHILS # BLD: 0.08 K/UL (ref 0–0.12)
BILIRUB SERPL-MCNC: 0.5 MG/DL (ref 0.1–1.5)
BILIRUB UR QL STRIP.AUTO: NEGATIVE
BNP SERPL-MCNC: 56 PG/ML (ref 0–100)
BUN SERPL-MCNC: 31 MG/DL (ref 8–22)
CALCIUM SERPL-MCNC: 9.2 MG/DL (ref 8.5–10.5)
CHLORIDE SERPL-SCNC: 102 MMOL/L (ref 96–112)
CO2 SERPL-SCNC: 25 MMOL/L (ref 20–33)
COLOR UR: YELLOW
CREAT SERPL-MCNC: 1.85 MG/DL (ref 0.5–1.4)
EKG IMPRESSION: NORMAL
EOSINOPHIL # BLD AUTO: 0.29 K/UL (ref 0–0.51)
EOSINOPHIL NFR BLD: 3.7 % (ref 0–6.9)
EPI CELLS #/AREA URNS HPF: NEGATIVE /HPF
ERYTHROCYTE [DISTWIDTH] IN BLOOD BY AUTOMATED COUNT: 39 FL (ref 35.9–50)
GLOBULIN SER CALC-MCNC: 3.2 G/DL (ref 1.9–3.5)
GLUCOSE BLD-MCNC: 124 MG/DL (ref 65–99)
GLUCOSE BLD-MCNC: 147 MG/DL (ref 65–99)
GLUCOSE BLD-MCNC: 170 MG/DL (ref 65–99)
GLUCOSE BLD-MCNC: 277 MG/DL (ref 65–99)
GLUCOSE BLD-MCNC: 33 MG/DL (ref 65–99)
GLUCOSE SERPL-MCNC: 85 MG/DL (ref 65–99)
GLUCOSE UR STRIP.AUTO-MCNC: NEGATIVE MG/DL
HCT VFR BLD AUTO: 38.6 % (ref 37–47)
HGB BLD-MCNC: 12.9 G/DL (ref 12–16)
HYALINE CASTS #/AREA URNS LPF: ABNORMAL /LPF
IMM GRANULOCYTES # BLD AUTO: 0.03 K/UL (ref 0–0.11)
IMM GRANULOCYTES NFR BLD AUTO: 0.4 % (ref 0–0.9)
KETONES UR STRIP.AUTO-MCNC: NEGATIVE MG/DL
LEUKOCYTE ESTERASE UR QL STRIP.AUTO: ABNORMAL
LYMPHOCYTES # BLD AUTO: 2.2 K/UL (ref 1–4.8)
LYMPHOCYTES NFR BLD: 27.7 % (ref 22–41)
MCH RBC QN AUTO: 29.5 PG (ref 27–33)
MCHC RBC AUTO-ENTMCNC: 33.4 G/DL (ref 33.6–35)
MCV RBC AUTO: 88.1 FL (ref 81.4–97.8)
MICRO URNS: ABNORMAL
MONOCYTES # BLD AUTO: 0.48 K/UL (ref 0–0.85)
MONOCYTES NFR BLD AUTO: 6.1 % (ref 0–13.4)
NEUTROPHILS # BLD AUTO: 4.85 K/UL (ref 2–7.15)
NEUTROPHILS NFR BLD: 61.1 % (ref 44–72)
NITRITE UR QL STRIP.AUTO: NEGATIVE
NRBC # BLD AUTO: 0 K/UL
NRBC BLD-RTO: 0 /100 WBC
PH UR STRIP.AUTO: 6.5 [PH]
PLATELET # BLD AUTO: 196 K/UL (ref 164–446)
PMV BLD AUTO: 10.7 FL (ref 9–12.9)
POTASSIUM SERPL-SCNC: 4 MMOL/L (ref 3.6–5.5)
PROT SERPL-MCNC: 7.4 G/DL (ref 6–8.2)
PROT UR QL STRIP: NEGATIVE MG/DL
RBC # BLD AUTO: 4.38 M/UL (ref 4.2–5.4)
RBC # URNS HPF: ABNORMAL /HPF
RBC UR QL AUTO: NEGATIVE
SODIUM SERPL-SCNC: 136 MMOL/L (ref 135–145)
SP GR UR STRIP.AUTO: 1.01
TROPONIN I SERPL-MCNC: <0.01 NG/ML (ref 0–0.04)
UROBILINOGEN UR STRIP.AUTO-MCNC: 0.2 MG/DL
WBC # BLD AUTO: 7.9 K/UL (ref 4.8–10.8)
WBC #/AREA URNS HPF: ABNORMAL /HPF

## 2019-04-10 PROCEDURE — 96374 THER/PROPH/DIAG INJ IV PUSH: CPT

## 2019-04-10 PROCEDURE — A9270 NON-COVERED ITEM OR SERVICE: HCPCS | Performed by: HOSPITALIST

## 2019-04-10 PROCEDURE — 93005 ELECTROCARDIOGRAM TRACING: CPT | Performed by: EMERGENCY MEDICINE

## 2019-04-10 PROCEDURE — 81001 URINALYSIS AUTO W/SCOPE: CPT

## 2019-04-10 PROCEDURE — 99285 EMERGENCY DEPT VISIT HI MDM: CPT

## 2019-04-10 PROCEDURE — 71045 X-RAY EXAM CHEST 1 VIEW: CPT

## 2019-04-10 PROCEDURE — 700102 HCHG RX REV CODE 250 W/ 637 OVERRIDE(OP): Performed by: HOSPITALIST

## 2019-04-10 PROCEDURE — 700101 HCHG RX REV CODE 250

## 2019-04-10 PROCEDURE — 80053 COMPREHEN METABOLIC PANEL: CPT

## 2019-04-10 PROCEDURE — 82962 GLUCOSE BLOOD TEST: CPT | Mod: 91

## 2019-04-10 PROCEDURE — G0378 HOSPITAL OBSERVATION PER HR: HCPCS

## 2019-04-10 PROCEDURE — 700105 HCHG RX REV CODE 258: Performed by: HOSPITALIST

## 2019-04-10 PROCEDURE — 99220 PR INITIAL OBSERVATION CARE,LEVL III: CPT | Performed by: HOSPITALIST

## 2019-04-10 PROCEDURE — 85025 COMPLETE CBC W/AUTO DIFF WBC: CPT

## 2019-04-10 PROCEDURE — 700101 HCHG RX REV CODE 250: Performed by: HOSPITALIST

## 2019-04-10 PROCEDURE — 93005 ELECTROCARDIOGRAM TRACING: CPT

## 2019-04-10 PROCEDURE — 84484 ASSAY OF TROPONIN QUANT: CPT

## 2019-04-10 PROCEDURE — 70450 CT HEAD/BRAIN W/O DYE: CPT

## 2019-04-10 PROCEDURE — 83880 ASSAY OF NATRIURETIC PEPTIDE: CPT

## 2019-04-10 RX ORDER — POLYETHYLENE GLYCOL 3350 17 G/17G
1 POWDER, FOR SOLUTION ORAL
Status: DISCONTINUED | OUTPATIENT
Start: 2019-04-10 | End: 2019-04-11 | Stop reason: HOSPADM

## 2019-04-10 RX ORDER — POTASSIUM CHLORIDE 20 MEQ/1
20 TABLET, EXTENDED RELEASE ORAL PRN
Status: DISCONTINUED | OUTPATIENT
Start: 2019-04-10 | End: 2019-04-10

## 2019-04-10 RX ORDER — OMEPRAZOLE 20 MG/1
20 CAPSULE, DELAYED RELEASE ORAL EVERY MORNING
Status: DISCONTINUED | OUTPATIENT
Start: 2019-04-11 | End: 2019-04-11 | Stop reason: HOSPADM

## 2019-04-10 RX ORDER — ACETAMINOPHEN 325 MG/1
650 TABLET ORAL EVERY 6 HOURS PRN
Status: DISCONTINUED | OUTPATIENT
Start: 2019-04-10 | End: 2019-04-11 | Stop reason: HOSPADM

## 2019-04-10 RX ORDER — BISACODYL 10 MG
10 SUPPOSITORY, RECTAL RECTAL
Status: DISCONTINUED | OUTPATIENT
Start: 2019-04-10 | End: 2019-04-11 | Stop reason: HOSPADM

## 2019-04-10 RX ORDER — FUROSEMIDE 20 MG/1
20 TABLET ORAL
COMMUNITY
End: 2019-10-16

## 2019-04-10 RX ORDER — HYDROCHLOROTHIAZIDE 25 MG/1
12.5 TABLET ORAL DAILY
COMMUNITY
End: 2019-04-10

## 2019-04-10 RX ORDER — PREDNISOLONE ACETATE 10 MG/ML
1 SUSPENSION/ DROPS OPHTHALMIC 4 TIMES DAILY
Status: DISCONTINUED | OUTPATIENT
Start: 2019-04-10 | End: 2019-04-11 | Stop reason: HOSPADM

## 2019-04-10 RX ORDER — LISINOPRIL 10 MG/1
10 TABLET ORAL DAILY
COMMUNITY
End: 2019-10-16

## 2019-04-10 RX ORDER — LATANOPROST 50 UG/ML
1 SOLUTION/ DROPS OPHTHALMIC NIGHTLY
Status: DISCONTINUED | OUTPATIENT
Start: 2019-04-10 | End: 2019-04-11 | Stop reason: HOSPADM

## 2019-04-10 RX ORDER — GABAPENTIN 300 MG/1
300 CAPSULE ORAL 3 TIMES DAILY
Status: DISCONTINUED | OUTPATIENT
Start: 2019-04-11 | End: 2019-04-10

## 2019-04-10 RX ORDER — BRIMONIDINE TARTRATE AND TIMOLOL MALEATE 2; 5 MG/ML; MG/ML
1 SOLUTION OPHTHALMIC 2 TIMES DAILY
Status: DISCONTINUED | OUTPATIENT
Start: 2019-04-10 | End: 2019-04-10

## 2019-04-10 RX ORDER — AMOXICILLIN 250 MG
2 CAPSULE ORAL 2 TIMES DAILY
Status: DISCONTINUED | OUTPATIENT
Start: 2019-04-11 | End: 2019-04-11 | Stop reason: HOSPADM

## 2019-04-10 RX ORDER — DEXTROSE MONOHYDRATE 25 G/50ML
50 INJECTION, SOLUTION INTRAVENOUS ONCE
Status: COMPLETED | OUTPATIENT
Start: 2019-04-10 | End: 2019-04-10

## 2019-04-10 RX ORDER — DEXTROSE MONOHYDRATE 25 G/50ML
25 INJECTION, SOLUTION INTRAVENOUS
Status: DISCONTINUED | OUTPATIENT
Start: 2019-04-10 | End: 2019-04-10

## 2019-04-10 RX ORDER — LISINOPRIL 10 MG/1
10 TABLET ORAL DAILY
Status: DISCONTINUED | OUTPATIENT
Start: 2019-04-11 | End: 2019-04-11 | Stop reason: HOSPADM

## 2019-04-10 RX ORDER — AMLODIPINE BESYLATE 10 MG/1
10 TABLET ORAL DAILY
Status: DISCONTINUED | OUTPATIENT
Start: 2019-04-11 | End: 2019-04-11 | Stop reason: HOSPADM

## 2019-04-10 RX ORDER — DEXTROSE MONOHYDRATE 25 G/50ML
INJECTION, SOLUTION INTRAVENOUS
Status: COMPLETED
Start: 2019-04-10 | End: 2019-04-10

## 2019-04-10 RX ORDER — PREDNISOLONE ACETATE 10 MG/ML
1 SUSPENSION/ DROPS OPHTHALMIC 4 TIMES DAILY
COMMUNITY
End: 2019-10-16

## 2019-04-10 RX ORDER — DEXTROSE MONOHYDRATE 25 G/50ML
25 INJECTION, SOLUTION INTRAVENOUS
Status: DISCONTINUED | OUTPATIENT
Start: 2019-04-10 | End: 2019-04-11 | Stop reason: HOSPADM

## 2019-04-10 RX ORDER — HYDROCHLOROTHIAZIDE 12.5 MG/1
12.5 TABLET ORAL DAILY
Status: ON HOLD | COMMUNITY
End: 2022-09-12

## 2019-04-10 RX ORDER — LEVOTHYROXINE SODIUM 0.03 MG/1
25 TABLET ORAL
Status: DISCONTINUED | OUTPATIENT
Start: 2019-04-11 | End: 2019-04-11 | Stop reason: HOSPADM

## 2019-04-10 RX ORDER — BRIMONIDINE TARTRATE AND TIMOLOL MALEATE 2; 5 MG/ML; MG/ML
1 SOLUTION OPHTHALMIC 2 TIMES DAILY
COMMUNITY
End: 2019-04-12

## 2019-04-10 RX ORDER — POTASSIUM CHLORIDE 20 MEQ/1
20 TABLET, EXTENDED RELEASE ORAL
COMMUNITY
End: 2019-10-16

## 2019-04-10 RX ORDER — GABAPENTIN 300 MG/1
300 CAPSULE ORAL 3 TIMES DAILY
Status: DISCONTINUED | OUTPATIENT
Start: 2019-04-10 | End: 2019-04-11 | Stop reason: HOSPADM

## 2019-04-10 RX ORDER — SODIUM CHLORIDE 9 MG/ML
INJECTION, SOLUTION INTRAVENOUS CONTINUOUS
Status: DISCONTINUED | OUTPATIENT
Start: 2019-04-10 | End: 2019-04-11 | Stop reason: HOSPADM

## 2019-04-10 RX ORDER — HYDROCHLOROTHIAZIDE 12.5 MG/1
12.5 TABLET ORAL DAILY
Status: DISCONTINUED | OUTPATIENT
Start: 2019-04-11 | End: 2019-04-11 | Stop reason: HOSPADM

## 2019-04-10 RX ORDER — LEVOTHYROXINE SODIUM 0.03 MG/1
25 TABLET ORAL
Status: SHIPPED | COMMUNITY
End: 2022-09-09

## 2019-04-10 RX ORDER — FERROUS SULFATE 325(65) MG
325 TABLET ORAL 2 TIMES DAILY
Status: DISCONTINUED | OUTPATIENT
Start: 2019-04-11 | End: 2019-04-11 | Stop reason: HOSPADM

## 2019-04-10 RX ORDER — ERGOCALCIFEROL 1.25 MG/1
50000 CAPSULE ORAL
COMMUNITY
End: 2019-04-12

## 2019-04-10 RX ORDER — GLIMEPIRIDE 2 MG/1
4 TABLET ORAL EVERY MORNING
COMMUNITY
End: 2019-06-17

## 2019-04-10 RX ADMIN — PREDNISOLONE ACETATE 1 DROP: 10 SUSPENSION/ DROPS OPHTHALMIC at 23:23

## 2019-04-10 RX ADMIN — LATANOPROST 1 DROP: 50 SOLUTION OPHTHALMIC at 23:24

## 2019-04-10 RX ADMIN — DEXTROSE MONOHYDRATE 50 ML: 25 INJECTION, SOLUTION INTRAVENOUS at 15:38

## 2019-04-10 RX ADMIN — ACETAMINOPHEN 650 MG: 325 TABLET, FILM COATED ORAL at 22:31

## 2019-04-10 RX ADMIN — SODIUM CHLORIDE: 9 INJECTION, SOLUTION INTRAVENOUS at 23:23

## 2019-04-10 ASSESSMENT — LIFESTYLE VARIABLES
EVER_SMOKED: NEVER
ALCOHOL_USE: NO

## 2019-04-10 ASSESSMENT — PATIENT HEALTH QUESTIONNAIRE - PHQ9
1. LITTLE INTEREST OR PLEASURE IN DOING THINGS: NOT AT ALL
SUM OF ALL RESPONSES TO PHQ9 QUESTIONS 1 AND 2: 0
2. FEELING DOWN, DEPRESSED, IRRITABLE, OR HOPELESS: NOT AT ALL

## 2019-04-10 NOTE — ED PROVIDER NOTES
ED Provider Note    Chief Complaint:   Abnormal speech    HPI:  Pam Hernandez is a 73 y.o. female who presents with chief complaint of generalized fatigue and abnormal speech.  She has a past medical history of diabetes and hypertension, no history of CVA or TIA.  Around 10:00 this morning, her family member noticed that she had slurred speech.  This lasted about 2-3 hours.  On arrival to the emergency department speech is no longer slurred but clear and easy to understand.  Family member states that her speech seems somewhat slower than usual but she does not appear confused.  Family members not noticed any facial droop, patient reports no unilateral upper or lower extremity weakness.  She has not had any recent fevers, no chest pain, no shortness of breath.  She does have a history of diabetes with resultant impaired immunity.  She has no abnormal rashes or lesions, no headaches, no neck or back pain.  She does have a history of glaucoma and has related vision changes that have been present for several years, unchanged today.  She is unable to identify any exacerbating or alleviating factors.    Review of Systems:  See HPI for pertinent positives and negatives. All other systems negative.    Past Medical History:   has a past medical history of Acid reflux; Anemia; Anesthesia; Arthritis; At risk for falling; Cataract; Dental disorder; Diabetes; Glaucoma; Hypertension; Hypothyroid (12/10/2018); Neuropathy (HCC); Pneumonia; Snoring; Urinary frequency; and Urinary incontinence.    Social History:  Social History     Social History Main Topics   • Smoking status: Never Smoker   • Smokeless tobacco: Never Used   • Alcohol use No   • Drug use: No   • Sexual activity: Not on file       Surgical History:   has a past surgical history that includes us-needle core bx-breast panel; cataract extraction with iol (Bilateral); eye surgery (Left, 2018); and vitrectomy posterior (Right, 12/11/2018).    Current  "Medications:  Home Medications     Reviewed by Marcy Ferguson R.N. (Registered Nurse) on 04/10/19 at 1344  Med List Status: Partial   Medication Last Dose Status   amLODIPine (NORVASC) 10 MG Tab 4/10/2019 Active   aspirin EC (ECOTRIN) 81 MG TBEC 4/10/2019 Active   calcium carbonate-vitamin D ([employer] SHELL CALCIUM-VIT D) 500-200 MG-UNIT Tab  Active   Cholecalciferol (VITAMIN D) 2000 UNIT TABS  Active   ferrous sulfate 325 (65 Fe) MG tablet  Active   gabapentin (NEURONTIN) 300 MG CAPS 4/10/2019 Active   hydroCHLOROthiazide (HYDRODIURIL) 25 MG Tab 4/10/2019 Active   insulin aspart (NOVOLOG) 100 UNIT/ML SOLN  Active   insulin detemir (LEVEMIR) 100 UNIT/ML SOLN  Active   latanoprost (XALATAN) 0.005 % SOLN  Active   meclizine (ANTIVERT) 25 MG Tab  Active   omeprazole (PRILOSEC) 20 MG delayed-release capsule  Active   polyethylene glycol/lytes (MIRALAX) Pack  Active   raNITidine (ZANTAC) 150 MG Tab  Active                Allergies:  No Known Allergies    Physical Exam:  Vital Signs: BP (!) 187/79   Pulse (!) 52   Temp 36.3 °C (97.4 °F)   Resp 20   Ht 1.651 m (5' 5\")   Wt 80.3 kg (177 lb)   LMP  (LMP Unknown)   SpO2 100%   BMI 29.45 kg/m²   Constitutional: Alert, no acute distress  HENT: Moist mucus membranes, normal posterior pharynx, no intraoral lesions  Eyes: Pupils equal and reactive, normal conjunctiva  Neck: Supple, normal range of motion, no stridor  Cardiovascular: Extremities are warm and well perfused, no murmur appreciated, normal cardiac auscultation  Pulmonary: No respiratory distress, normal work of breathing, no accessory muscule usage, breath sounds clear and equal bilaterally  Abdomen: Soft, non-distended, non-tender to palpation, no peritoneal signs  Skin: Warm, dry, no rashes or lesions  Musculoskeletal: Normal range of motion in all extremities, no swelling or deformity noted  Neurologic: CN II-XII intact, speech normal, muscle strength 5/5 in all four extremities, normal  strength " bilaterally, sensation grossly intact, normal finger-to-nose, no pronator drift, NIH stroke scale is 0.  Psychiatric: Normal and appropriate mood and affect    Medical records reviewed for continuity of care.  Discharge summary reviewed from 1/26/19.  Patient noted to have past medical history of hypertension, CKD, followed by [facility] nephrology.  Also has a past medical history of diabetes, anemia, and glaucoma.  She was hyperkalemic on admission to 6.1.  Potassium levels worsen, she was transferred to ICU for close monitoring.  She did have abdominal pain, MRCP was performed and negative though cholelithiasis was noted.  Nephrology recommended discontinuation of lisinopril.  She was started on amlodipine for blood pressure control.    EKG: Rate 52, sinus bradycardia, no ST elevation or depression, T wave flattening throughout all leads without any significant change from prior, no ectopy    Labs:  Labs Reviewed   CBC WITH DIFFERENTIAL - Abnormal; Notable for the following:        Result Value    MCHC 33.4 (*)     All other components within normal limits   COMP METABOLIC PANEL - Abnormal; Notable for the following:     Bun 31 (*)     Creatinine 1.85 (*)     Alkaline Phosphatase 105 (*)     All other components within normal limits   URINALYSIS,CULTURE IF INDICATED - Abnormal; Notable for the following:     Leukocyte Esterase Trace (*)     All other components within normal limits   ESTIMATED GFR - Abnormal; Notable for the following:     GFR If  32 (*)     GFR If Non  27 (*)     All other components within normal limits   URINE MICROSCOPIC (W/UA) - Abnormal; Notable for the following:     RBC 2-5 (*)     Bacteria Many (*)     All other components within normal limits   ACCU-CHEK GLUCOSE - Abnormal; Notable for the following:     Glucose - Accu-Ck 33 (*)     All other components within normal limits   ACCU-CHEK GLUCOSE - Abnormal; Notable for the following:     Glucose - Accu-Ck  170 (*)     All other components within normal limits   ACCU-CHEK GLUCOSE - Abnormal; Notable for the following:     Glucose - Accu-Ck 124 (*)     All other components within normal limits   ACCU-CHEK GLUCOSE - Abnormal; Notable for the following:     Glucose - Accu-Ck 147 (*)     All other components within normal limits   TROPONIN   BTYPE NATRIURETIC PEPTIDE       Radiology:  DX-CHEST-PORTABLE (1 VIEW)   Final Result      Mild bibasilar atelectasis.      CT-HEAD W/O    (Results Pending)          ED Medications Administered:  Medications   dextrose 50% (D50W) injection 50 mL (0 mL Intravenous Stopped 4/10/19 8560)       Differential diagnosis:  CVA, TIA, electrolyte abnormality, hypoglycemia      MDM:  History and physical exam as documented above.  Patient presents with approximately 2 hours of slurred speech, now resolved.  Family reports that her speech is slower than usual at this time, however she has a normal neurologic exam with no focal deficits.  Given her normal neurologic exam and NIH stroke scale of 0 she is not a candidate for stroke activation nor alteplase at this time.    On laboratory evaluation creatinine is elevated to 1.85, this is consistent with her baseline.  CMP is otherwise reassuring with normal sodium and normal potassium.  BNP is within normal limits, troponin within normal limits, no evidence of NSTEMI.  White blood count is within normal limits, hemoglobin within normal limits, no evidence of symptomatic anemia.    She did take her insulin earlier today but did not eat.  Accu-Chek on arrival was in the 130s.  Blood glucose on [facility] was 85.  Nursing staff checked her blood glucose during her stay, Accu-Chek was 33.  She was treated with an amp of D50.    Chest x-ray is negative for acute process.    CT head is negative given her hospital course, I suspect that her isolated episode of slurred speech at home was due to hypoglycemia.     Plan at this time is for admission for continued blood  glucose monitoring, as well as consideration of MRI to evaluate for further evidence of CVA or TIA.  Patient is in agreement with this plan.  Case discussed with hospitalist who kindly agrees to admit the patient.    Disposition:  Admit to hospitalist in guarded condition.    Final Impression:  1. Hypoglycemia    2. Slurred speech    3. Transient alteration of awareness        Electronically signed by: Ofelia Manuel, 4/10/2019 6:10 PM

## 2019-04-10 NOTE — ED NOTES
Pt to R1 via wheelchair.  Agree with triage RN note.    Pt also reports blurred vision in part of her eye.  Per pts daughter, pts speech is slurred and slower than normal.

## 2019-04-10 NOTE — ED TRIAGE NOTES
"Chief Complaint   Patient presents with   • Weakness   EKG done prior to triage. BIB EMS with IV in place. Reports generalized weakness.  Pt went to [facility] with friends around 10 am, friends thought they noticed slurred speech but pt had recent dental work - states her mouth still doesn't feel normal. Felt weak around lunch time. Pt reports vague symptoms.  No facial droop, equal , pt able to hold arms and legs up, no other stroke like symptoms. Pt reports \"burning\" to L temple. FSBS 145 with EMS.     BP (!) 187/79   Pulse (!) 54   Temp 36.3 °C (97.4 °F)   Resp 16   Ht 1.651 m (5' 5\")   Wt 80.3 kg (177 lb)   LMP  (LMP Unknown)   SpO2 100%   BMI 29.45 kg/m²     Pt Informed regarding triage process and verbalized understanding to inform triage tech or RN for any changes in condition.  Placed in lobby.    "

## 2019-04-11 ENCOUNTER — PATIENT OUTREACH (OUTPATIENT)
Dept: HEALTH INFORMATION MANAGEMENT | Facility: OTHER | Age: 74
End: 2019-04-11

## 2019-04-11 VITALS
BODY MASS INDEX: 28.87 KG/M2 | HEIGHT: 65 IN | TEMPERATURE: 98.2 F | OXYGEN SATURATION: 96 % | RESPIRATION RATE: 16 BRPM | SYSTOLIC BLOOD PRESSURE: 144 MMHG | WEIGHT: 173.28 LBS | HEART RATE: 68 BPM | DIASTOLIC BLOOD PRESSURE: 66 MMHG

## 2019-04-11 PROBLEM — E16.2 HYPOGLYCEMIA: Status: RESOLVED | Noted: 2019-04-10 | Resolved: 2019-04-11

## 2019-04-11 PROBLEM — R47.81 SLURRED SPEECH: Status: RESOLVED | Noted: 2019-04-10 | Resolved: 2019-04-11

## 2019-04-11 LAB
GLUCOSE BLD-MCNC: 286 MG/DL (ref 65–99)
GLUCOSE BLD-MCNC: 77 MG/DL (ref 65–99)

## 2019-04-11 PROCEDURE — 82962 GLUCOSE BLOOD TEST: CPT

## 2019-04-11 PROCEDURE — 700105 HCHG RX REV CODE 258: Performed by: HOSPITALIST

## 2019-04-11 PROCEDURE — A9270 NON-COVERED ITEM OR SERVICE: HCPCS | Performed by: HOSPITALIST

## 2019-04-11 PROCEDURE — 700102 HCHG RX REV CODE 250 W/ 637 OVERRIDE(OP): Performed by: HOSPITALIST

## 2019-04-11 PROCEDURE — 99217 PR OBSERVATION CARE DISCHARGE: CPT | Performed by: HOSPITALIST

## 2019-04-11 PROCEDURE — G0378 HOSPITAL OBSERVATION PER HR: HCPCS

## 2019-04-11 PROCEDURE — 96372 THER/PROPH/DIAG INJ SC/IM: CPT

## 2019-04-11 RX ADMIN — GABAPENTIN 300 MG: 300 CAPSULE ORAL at 01:04

## 2019-04-11 RX ADMIN — LEVOTHYROXINE SODIUM 25 MCG: 25 TABLET ORAL at 06:15

## 2019-04-11 RX ADMIN — FERROUS SULFATE TAB 325 MG (65 MG ELEMENTAL FE) 325 MG: 325 (65 FE) TAB at 06:25

## 2019-04-11 RX ADMIN — AMLODIPINE BESYLATE 10 MG: 10 TABLET ORAL at 06:14

## 2019-04-11 RX ADMIN — HYDROCHLOROTHIAZIDE 12.5 MG: 12.5 TABLET ORAL at 06:14

## 2019-04-11 RX ADMIN — PREDNISOLONE ACETATE 1 DROP: 10 SUSPENSION/ DROPS OPHTHALMIC at 08:49

## 2019-04-11 RX ADMIN — SENNOSIDES, DOCUSATE SODIUM 2 TABLET: 50; 8.6 TABLET, FILM COATED ORAL at 06:14

## 2019-04-11 RX ADMIN — SODIUM CHLORIDE: 9 INJECTION, SOLUTION INTRAVENOUS at 08:48

## 2019-04-11 RX ADMIN — LISINOPRIL 10 MG: 10 TABLET ORAL at 06:14

## 2019-04-11 RX ADMIN — OMEPRAZOLE 20 MG: 20 CAPSULE, DELAYED RELEASE ORAL at 06:14

## 2019-04-11 RX ADMIN — ASPIRIN 81 MG: 81 TABLET, COATED ORAL at 06:25

## 2019-04-11 RX ADMIN — INSULIN HUMAN 3 UNITS: 100 INJECTION, SOLUTION PARENTERAL at 01:07

## 2019-04-11 RX ADMIN — GABAPENTIN 300 MG: 300 CAPSULE ORAL at 06:14

## 2019-04-11 ASSESSMENT — ENCOUNTER SYMPTOMS
ABDOMINAL PAIN: 0
HEADACHES: 0
SORE THROAT: 0
FEVER: 0
PALPITATIONS: 0
DIZZINESS: 0
MYALGIAS: 0
CHILLS: 0
NAUSEA: 0
WHEEZING: 0
TINGLING: 0
DEPRESSION: 0
SHORTNESS OF BREATH: 0
COUGH: 0
PHOTOPHOBIA: 0
FOCAL WEAKNESS: 0
DIARRHEA: 0
VOMITING: 0

## 2019-04-11 NOTE — ASSESSMENT & PLAN NOTE
Blood pressures currently controlled on home Norvasc, Prinivil, and Lasix, continue home medications and monitor.

## 2019-04-11 NOTE — DISCHARGE SUMMARY
Discharge Summary    CHIEF COMPLAINT ON ADMISSION  Chief Complaint   Patient presents with   • Weakness     generalized       Reason for Admission  Generalized Weakness     Admission Date  4/10/2019    CODE STATUS  Full Code    HPI & HOSPITAL COURSE  This is a 73 y.o. female here with generalized weakness, slurred speech.  Patient has known history of hypothyroid, acid reflux, anemia, arthritis, diabetes, glaucoma, hypertension, neuropathy, urinary frequency and urinary incontinence.  Patient presented to the emergency room after she was noted to start having slurred speech around 10:00 in the morning that resolved spontaneously.  Patient was transported to the emergency room for further evaluation and at that time her blood sugar was noted to be 30.  Patient was given glucose in the emergency room and her symptoms resolved.  Patient was admitted to CDU for further workup and monitoring.  CBC and BMP on admission are essentially benign and not tributary with the exception of slightly elevated creatinine at 1.85.  Patient was given IV fluid hydration.  CT head was completed due to patient's slurred speech which is negative for acute findings.  Patient was observed overnight and frequent Accu-Cheks were completed with hypoglycemia protocol as needed.  Patient was continued on her home anti-hypertensive medications.  Patient had no further episodes of hypoglycemia and symptoms never reoccurred.  Long discussion with patient regarding need to carry glucose tabs or hard candy with her for future episodes of hypoglycemia.  Patient encouraged to eat small meals frequently throughout the day to control her blood sugar.  Patient states she feels returned to baseline and is ready for discharge at this time.  Patient's vital signs are stable, patient is ambulating independently, and patient is maintaining oxygen saturations on room air.  Patient has no complaints at this time and will follow up with her PCP.        Therefore,  she is discharged in good and stable condition to home with close outpatient follow-up.        Discharge Date  4/11/2019    FOLLOW UP ITEMS POST DISCHARGE  Follow up with PCP  Keep glucose tabs or snack with you to avoid hypoglycemia    DISCHARGE DIAGNOSES  Principal Problem:    Slurred speech POA: Unknown  Active Problems:    Hypoglycemia POA: Unknown    Essential hypertension POA: Yes    Hypothyroidism POA: Unknown  Resolved Problems:    * No resolved hospital problems. *      FOLLOW UP  Future Appointments  Date Time Provider Department Center   5/1/2019 10:30 AM Fadi Najjar, M.D. NE[facility]     Juan R Solis M.D.  [address] [facility] [address]  [phone]    Go on 4/18/2019  PLEASE ARRIVE AT 2:00PM FOR YOUR 2:20PM APPOINTMENT. THANK YOU      MEDICATIONS ON DISCHARGE     Medication List      CONTINUE taking these medications      Instructions   amLODIPine 10 MG Tabs  Commonly known as:  NORVASC   Take 1 Tab by mouth every day.  Dose:  10 mg     aspirin EC 81 MG Tbec  Commonly known as:  ECOTRIN   Take 81 mg by mouth every day.  Dose:  81 mg     COMBIGAN 0.2-0.5 % Soln  Generic drug:  Brimonidine Tartrate-Timolol   Place 1 Drop in both eyes 2 Times a Day.  Dose:  1 Drop     [employer] SHELL CALCIUM-VIT D 500-200 MG-UNIT Tabs  Generic drug:  calcium carbonate-vitamin D   Take 1 Tab by mouth 2 Times a Day.  Dose:  1 Tab     ferrous sulfate 325 (65 Fe) MG tablet   Take 325 mg by mouth 2 Times a Day.  Dose:  325 mg     furosemide 20 MG Tabs  Commonly known as:  LASIX   Take 20 mg by mouth as needed. With Potassium  Dose:  20 mg     gabapentin 300 MG Caps  Commonly known as:  NEURONTIN   Take 300 mg by mouth 3 times a day.  Dose:  300 mg     glimepiride 2 MG Tabs  Commonly known as:  AMARYL   Take 4 mg by mouth every morning.  Dose:  4 mg     hydroCHLOROthiazide 12.5 MG tablet  Commonly known as:  HYDRODIURIL   Take 12.5 mg by mouth every day.  Dose:  12.5 mg     insulin detemir 100 UNIT/ML Soln  Commonly  known as:  LEVEMIR   Inject 30 Units as instructed every day.  Dose:  30 Units     levothyroxine 25 MCG Tabs  Commonly known as:  SYNTHROID   Take 25 mcg by mouth Every morning on an empty stomach.  Dose:  25 mcg     lisinopril 10 MG Tabs  Commonly known as:  PRINIVIL   Take 10 mg by mouth every day.  Dose:  10 mg     NOVOLOG 100 UNIT/ML Soln  Generic drug:  insulin aspart   Inject 5-10 Units as instructed 3 times a day before meals.  Dose:  5-10 Units     omeprazole 20 MG delayed-release capsule  Commonly known as:  PRILOSEC   Take 20 mg by mouth every morning.  Dose:  20 mg     potassium chloride SA 20 MEQ Tbcr  Commonly known as:  Kdur   Take 20 mEq by mouth as needed. With Lasix  Dose:  20 mEq     prednisoLONE acetate 1 % Susp  Commonly known as:  PRED FORTE   Place 1 Drop in left eye 4 times a day.  Dose:  1 Drop     raNITidine 150 MG Tabs  Commonly known as:  ZANTAC   Take 150 mg by mouth as needed.  Dose:  150 mg     vitamin D (Ergocalciferol) 81230 units Caps capsule  Commonly known as:  DRISDOL   Take 50,000 Units by mouth every 7 days.  Dose:  45203 Units     vitamin D 2000 UNIT Tabs   Take 2,000 Units by mouth every day.  Dose:  2000 Units     XALATAN 0.005 % Soln  Generic drug:  latanoprost   Place 1 Drop in both eyes every evening.  Dose:  1 Drop            Allergies  No Known Allergies    DIET  Orders Placed This Encounter   Procedures   • Diet Order Regular     Standing Status:   Standing     Number of Occurrences:   1     Order Specific Question:   Diet:     Answer:   Regular [1]       ACTIVITY  As tolerated.  Weight bearing as tolerated    CONSULTATIONS  None     PROCEDURES  None     LABORATORY  Lab Results   Component Value Date    SODIUM 136 04/10/2019    POTASSIUM 4.0 04/10/2019    CHLORIDE 102 04/10/2019    CO2 25 04/10/2019    GLUCOSE 85 04/10/2019    BUN 31 (H) 04/10/2019    CREATININE 1.85 (H) 04/10/2019    CREATININE 0.9 08/09/2007        Lab Results   Component Value Date    WBC 7.9  04/10/2019    HEMOGLOBIN 12.9 04/10/2019    HEMATOCRIT 38.6 04/10/2019    PLATELETCT 196 04/10/2019     CT-HEAD W/O   Final Result      1.  No CT evidence of acute infarct, hemorrhage or mass.   2.  Mild global parenchymal atrophy and chronic small vessel ischemic changes.      DX-CHEST-PORTABLE (1 VIEW)   Final Result      Mild bibasilar atelectasis.

## 2019-04-11 NOTE — PROGRESS NOTES
A/o,assessment completed,poc discussed,verbalized understanding,tolerating po well,c/o HA 8/10, medicated with tylenol, assisted up to the bathroom, gait steady,call button within reach,will continue to monitor.

## 2019-04-11 NOTE — DISCHARGE PLANNING
Care Transition Team Assessment    Spoke with patient at bedside. Anticipate no needs @ present time. Granddaughter will be ride @ D/C.    Information Source  Orientation : Oriented x 4  Information Given By: Patient    Readmission Evaluation  Is this a readmission?: No    Interdisciplinary Discharge Planning  Does Admitting Nurse Feel This Could be a Complex Discharge?: No  Primary Care Physician: Will  Lives with - Patient's Self Care Capacity: Child Less than 18 Years of Age  Patient or legal guardian wants to designate a caregiver (see row info): No  Support Systems: Family Member(s)  Housing / Facility: [address]  Do You Take your Prescribed Medications Regularly: Yes  Able to Return to Previous ADL's: Yes  Mobility Issues: No  Prior Services: Home-Independent  Patient Expects to be Discharged to:: Home  Assistance Needed: No  Durable Medical Equipment: Not Applicable    Discharge Preparedness  What are your discharge supports?: Other (comment) ([facility])  Prior Functional Level: Ambulatory    Functional Assesment  Prior Functional Level: Ambulatory    Finances  Prescription Coverage: Yes    Anticipated Discharge Information  Anticipated discharge disposition: Home  Discharge Address: [address] [place] [address] road  Discharge Contact Phone Number: [phone]

## 2019-04-11 NOTE — ED NOTES
Assumed care at this time, patient stable w no ss of distress noted. No needs or complaints verbalized.

## 2019-04-11 NOTE — H&P
Hospital Medicine History & Physical Note    Date of Service  4/10/2019    Primary Care Physician  Juan R Solis M.D.    Consultants  None    Code Status  Full    Chief Complaint  Chief Complaint   Patient presents with   • Weakness     generalized       History of Presenting Illness  73 y.o. female who presented on 4/10/2019 with slurred speech and weakness.  At the time of my visit at her bedside, the patient's mentation was clear and she had no speech difficulties.  By report, family members who are not currently at bedside noted that the patient began having slurred speech around 10:00 in the morning which lasted for several hours and then resolved spontaneously.  They elected to bring her to the hospital for further evaluation, at the time of her arrival, the patient was alert and oriented with no evidence of slurred speech.  Upon further monitoring however, she did developed an episode of slurred speech.  Workup during that time showed a blood sugar of 30.  Her symptoms then resolved when she was given glucose.  Patient otherwise denies any somatic complaints, she states that she has had no recent fevers, chills, URIs, headache, chest pain, or shortness of breath.    Review of Systems  Review of Systems   Constitutional: Negative for chills and fever.   HENT: Negative for congestion and sore throat.    Eyes: Negative for photophobia.   Respiratory: Negative for cough, shortness of breath and wheezing.    Cardiovascular: Negative for chest pain and palpitations.   Gastrointestinal: Negative for abdominal pain, diarrhea, nausea and vomiting.   Genitourinary: Negative for dysuria.   Musculoskeletal: Negative for myalgias.   Skin: Negative.    Neurological: Negative for dizziness, tingling, focal weakness and headaches.   Psychiatric/Behavioral: Negative for depression and suicidal ideas.       Past Medical History  Past Medical History:   Diagnosis Date   • Hypothyroid 12/10/2018    on no meds at this time   •  Acid reflux    • Anemia    • Anesthesia     reports wants sleep, hard to wake up   • Arthritis     fingers/hands   • At risk for falling    • Cataract     removed Dr.Stanko benjamín   • Dental disorder     lower partial/upper denture   • Diabetes     oral meds and insulin dependent   • Glaucoma     bilat   • Hypertension    • Neuropathy (HCC)    • Pneumonia     2015   • Snoring     no sleep study   • Urinary frequency    • Urinary incontinence        Surgical History  Past Surgical History:   Procedure Laterality Date   • VITRECTOMY POSTERIOR Right 12/11/2018    Procedure: VITRECTOMY POSTERIOR-  LASER;  Surgeon: Katina Bolaños M.D.;  Location: SURGERY SAME DAY [facility];  Service: Ophthalmology   • EYE SURGERY Left 2018    for glaucoma   • CATARACT EXTRACTION WITH IOL Bilateral    • US-NEEDLE CORE BX-BREAST PANEL         Family History  History reviewed. No pertinent family history.    Social History  Social History   Substance Use Topics   • Smoking status: Never Smoker   • Smokeless tobacco: Never Used   • Alcohol use No       Allergies  No Known Allergies    Medications  No current facility-administered medications on file prior to encounter.      Current Outpatient Prescriptions on File Prior to Encounter   Medication Sig Dispense Refill   • amLODIPine (NORVASC) 10 MG Tab Take 1 Tab by mouth every day. 30 Tab 0   • calcium carbonate-vitamin D ([employer] OY[employer] SHELL CALCIUM-VIT D) 500-200 MG-UNIT Tab Take 1 Tab by mouth 2 Times a Day.     • raNITidine (ZANTAC) 150 MG Tab Take 150 mg by mouth as needed.     • ferrous sulfate 325 (65 Fe) MG tablet Take 325 mg by mouth 2 Times a Day.     • omeprazole (PRILOSEC) 20 MG delayed-release capsule Take 20 mg by mouth every morning.     • aspirin EC (ECOTRIN) 81 MG TBEC Take 81 mg by mouth every day.     • gabapentin (NEURONTIN) 300 MG CAPS Take 300 mg by mouth 3 times a day.     • Cholecalciferol (VITAMIN D) 2000 UNIT TABS Take 2,000 Units by mouth every day.     •  latanoprost (XALATAN) 0.005 % SOLN Place 1 Drop in both eyes every evening.         Physical Exam  Hemodynamics  Temp (24hrs), Av.3 °C (97.4 °F), Min:36.3 °C (97.4 °F), Max:36.3 °C (97.4 °F)   Temperature: 36.3 °C (97.4 °F)  Pulse  Av  Min: 52  Max: 64 Heart Rate (Monitored): 65  Blood Pressure : (!) 187/79, NIBP: 151/64      Respiratory      Respiration: 15, Pulse Oximetry: 100 %        RML Breath Sounds: Crackles, RLL Breath Sounds: Crackles, LLL Breath Sounds: Crackles    Physical Exam   Constitutional: She is oriented to person, place, and time. No distress.   HENT:   Head: Normocephalic and atraumatic.   Right Ear: External ear normal.   Left Ear: External ear normal.   Eyes: EOM are normal. Right eye exhibits no discharge. Left eye exhibits no discharge.   Neck: Neck supple. No JVD present.   Cardiovascular: Normal rate, regular rhythm and normal heart sounds.    Pulmonary/Chest: Effort normal and breath sounds normal. No respiratory distress. She exhibits no tenderness.   Abdominal: Soft. Bowel sounds are normal. She exhibits no distension. There is no tenderness.   Musculoskeletal: Normal range of motion. She exhibits no edema.   Neurological: She is alert and oriented to person, place, and time. No cranial nerve deficit.   Skin: Skin is warm and dry. She is not diaphoretic. No erythema.   Psychiatric: She has a normal mood and affect. Her behavior is normal.   Nursing note and vitals reviewed.    Capillary refill less than 3 seconds, distal pulses intact    Laboratory:  Recent Labs      04/10/19   1339   WBC  7.9   RBC  4.38   HEMOGLOBIN  12.9   HEMATOCRIT  38.6   MCV  88.1   MCH  29.5   MCHC  33.4*   RDW  39.0   PLATELETCT  196   MPV  10.7     Recent Labs      04/10/19   1339   SODIUM  136   POTASSIUM  4.0   CHLORIDE  102   CO2  25   GLUCOSE  85   BUN  31*   CREATININE  1.85*   CALCIUM  9.2     Recent Labs      04/10/19   1339   ALTSGPT  8   ASTSGOT  23   ALKPHOSPHAT  105*   TBILIRUBIN  0.5    GLUCOSE  85         Recent Labs      04/10/19   1339   BNPBTYPENAT  56         Lab Results   Component Value Date    TROPONINI <0.01 04/10/2019       Imaging  Ct-head W/o    Result Date: 4/10/2019   CT HEAD WITHOUT CONTRAST 4/10/2019 5:53 PM HISTORY/REASON FOR EXAM:  Neurological deficit TECHNIQUE/EXAM DESCRIPTION AND NUMBER OF VIEWS: CT of the head without contrast. The study was performed on a helical multidetector CT scanner. Contiguous 2.5 mm axial sections were obtained from the skull base through the vertex. Up to date radiation dose reduction adjustments have been utilized to meet ALARA standards for radiation dose reduction. COMPARISON:  None available FINDINGS: Lateral ventricles are normal in size and symmetric. Cortical sulci are mildly prominent. Chronic small vessel ischemic changes. No significant mass effect or midline shift. Basal cisterns are patent. No evidence for intracranial hemorrhage. Calvaria are intact. Atherosclerosis. Visualized orbits are unremarkable. Prior left eye surgery. Visualized mastoid air cells are clear. Visualized paranasal sinuses are unremarkable.     1.  No CT evidence of acute infarct, hemorrhage or mass. 2.  Mild global parenchymal atrophy and chronic small vessel ischemic changes.    Dx-chest-portable (1 View)    Result Date: 4/10/2019  4/10/2019 2:28 PM HISTORY/REASON FOR EXAM:  Weakness. TECHNIQUE/EXAM DESCRIPTION AND NUMBER OF VIEWS: Single portable view of the chest. COMPARISON: 1/23/2019 FINDINGS: Single portable view of the chest demonstrates a stable cardiac silhouette and mediastinal contours. There is mild bibasilar atelectasis. No pleural fluid or pneumothorax. Degenerative changes are in the spine.     Mild bibasilar atelectasis.    Us-extremity Venous Lower Bilat    Result Date: 3/18/2019   Vascular Laboratory  CONCLUSIONS  Bilateral lower extremities -.  No DVT  Incidental finding, Baker's cysts' are visualized behind both knees with  mixed echogenicity  seen in both structures.  OLIVIA JOYCE  Exam Date:     2019 15:05  Room #:     Out Patient  Priority:     Stat  Ht (in):             Wt (lb):  Ordering Physician:        ODETTE JARVIS  Referring Physician:       [id number]  Sonographer:               Theo Demarco RVT  Study Type:                Complete Bilateral  Technical Quality:         Adequate  Age:    73    Gender:     F  MRN:    [id number]  :    1945      BSA:  Indications:     Pain in leg, unspecified  CPT Codes:       46335  ICD Codes:       A76863  History:         Bilateral lower extremity pain  Limitations:  PROCEDURES:  Bilateral lower extremity venous duplex imaging.  The following venous structures were evaluated: common femoral, profunda  femoral, greater saphenous, femoral, popliteal , peroneal and posterior  tibial veins.  Serial compression, augmentation maneuvers,  color and spectral Doppler  flow evaluations were performed.  FINDINGS:  Bilateral lower extremities -.  Complete color filling and compressibility with normal venous flow dynamics  including spontaneous flow, response to augmentation maneuvers, and  respiratory phasicity.  Incidental finding, Baker's cysts' are visualized behind both knees with  mixed echogenicity seen in both structures.  Jose De Jesus Garnica  (Electronically Signed)  Final Date:      2019                   16:42        Assessment/Plan:  Anticipate that patient will need less than 2 midnights for management of the discussed medical issues.    * Slurred speech   Assessment & Plan    Slurred speech has resolved and patient is back to her baseline.  Given the fact that her symptoms had occurred in the setting of hypoglycemia, suspect that this is related to her low blood sugar levels rather than a neurologic etiology.  CT scan was obtained of the head and is negative.  She has had no other deficits.  Nevertheless, we will monitor her closely overnight on neurology with every 4-hour neurology  checks and keep her on the telemetry monitor to assess for any evidence of cardiac dysrhythmias.  Unless she develops any new neurologic deficits, I would hold off on any further workup and instead control her hypoglycemia.  For this I will hold all of her long-acting hypoglycemics, place her on the hypoglycemia protocol, and continue with Accu-Cheks.     Hypoglycemia   Assessment & Plan    With a known history of diabetes mellitus, treatment as noted above.  Plan to resume home hypoglycemics once blood sugar recovers.     Hypothyroidism   Assessment & Plan    This is chronic, continue home Synthroid.     Essential hypertension- (present on admission)   Assessment & Plan    Blood pressures currently controlled on home Norvasc, Prinivil, and Lasix, continue home medications and monitor.         Prophylaxis: Sequential compression devices for DVT prophylaxis, home PPI indicated, bowel protocol as needed

## 2019-04-11 NOTE — ASSESSMENT & PLAN NOTE
Slurred speech has resolved and patient is back to her baseline.  Given the fact that her symptoms had occurred in the setting of hypoglycemia, suspect that this is related to her low blood sugar levels rather than a neurologic etiology.  CT scan was obtained of the head and is negative.  She has had no other deficits.  Nevertheless, we will monitor her closely overnight on neurology with every 4-hour neurology checks and keep her on the telemetry monitor to assess for any evidence of cardiac dysrhythmias.  Unless she develops any new neurologic deficits, I would hold off on any further workup and instead control her hypoglycemia.  For this I will hold all of her long-acting hypoglycemics, place her on the hypoglycemia protocol, and continue with Accu-Cheks.

## 2019-04-11 NOTE — DISCHARGE INSTRUCTIONS
Discharge Instructions    Discharged to home by car with relative. Discharged via wheelchair, hospital escort: Yes.  Special equipment needed: Not Applicable    Be sure to schedule a follow-up appointment with your primary care doctor or any specialists as instructed.     Discharge Plan:   Diet Plan: Discussed  Activity Level: Discussed  Confirmed Follow up Appointment: Patient to Call and Schedule Appointment  Confirmed Symptoms Management: Discussed  Influenza Vaccine Indication: Not indicated: Previously immunized this influenza season and > 8 years of age    I understand that a diet low in cholesterol, fat, and sodium is recommended for good health. Unless I have been given specific instructions below for another diet, I accept this instruction as my diet prescription.   Other diet:     Special Instructions: None    · Is patient discharged on Warfarin / Coumadin?   No     Depression / Suicide Risk    As you are discharged from this [facility] facility, it is important to learn how to keep safe from harming yourself.    Recognize the warning signs:  · Abrupt changes in personality, positive or negative- including increase in energy   · Giving away possessions  · Change in eating patterns- significant weight changes-  positive or negative  · Change in sleeping patterns- unable to sleep or sleeping all the time   · Unwillingness or inability to communicate  · Depression  · Unusual sadness, discouragement and loneliness  · Talk of wanting to die  · Neglect of personal appearance   · Rebelliousness- reckless behavior  · Withdrawal from people/activities they love  · Confusion- inability to concentrate     If you or a loved one observes any of these behaviors or has concerns about self-harm, here's what you can do:  · Talk about it- your feelings and reasons for harming yourself  · Remove any means that you might use to hurt yourself (examples: pills, rope, extension cords, firearm)  · Get professional help from  the community (Mental Health, Substance Abuse, psychological counseling)  · Do not be alone:Call your Safe Contact- someone whom you trust who will be there for you.  · Call your local CRISIS HOTLINE 942-9402 or 626-319-9166  · Call your local Children's Mobile Crisis Response Team [place] (192) 303-0779 or www.Flyzik  · Call the toll free National Suicide Prevention Hotlines   · National Suicide Prevention Lifeline 788-491-SOKM (1929)  · National Hope Line Network 800-SUICIDE (498-8890)

## 2019-04-12 ENCOUNTER — PATIENT OUTREACH (OUTPATIENT)
Dept: HEALTH INFORMATION MANAGEMENT | Facility: OTHER | Age: 74
End: 2019-04-12

## 2019-04-12 NOTE — PROGRESS NOTES
Outbound call to Pam for post discharge medication review. Patient requested return call around 10:30 am. Reviewed medications. No clinically significant interactions noted. Discussed gabapentin dosing. Patient reports taking 1 capsule in the morning and 2 capsules in the evening. Denies adverse effects. Nephrologist previously notified of dosing.     Outbound call to Pam to continue medication review. [facility] post discharge med rec completed. No clinically significant medication issues noted. Patient denies side effects, barriers to accessing medications, or trouble with adherence. She states an RN assists her in filling weekly pill box that separates morning and evening medications.     Reviewed indication, dose, and timing of each medication. Reconciled med list in trgt.us. Patient states she had been previously instructed to stop lisinopril by her nephrologist. This is now on medication list to continue at discharge. Outbound call to Dr. Najjar's office for clarification. Left VM regarding above. Patient reports BP reading of 151/75 this morning and is keeping a log for provider visits.     Patient reports monitoring blood sugars BID and this morning had FBS of 137. Reviewed signs/symptoms of hypoglycemia and how and when to treat hypoglycemic episodes. Patient reports having follow-up appointment with PCP on 4/18/19.     Up-to-date with current immunizations. Discussed shingrix vaccination.     Mojgan Hong, PharmD

## 2019-04-29 LAB — EKG IMPRESSION: NORMAL

## 2019-05-01 ENCOUNTER — OFFICE VISIT (OUTPATIENT)
Dept: NEPHROLOGY | Facility: MEDICAL CENTER | Age: 74
End: 2019-05-01
Payer: MEDICARE

## 2019-05-01 VITALS
RESPIRATION RATE: 16 BRPM | BODY MASS INDEX: 28.82 KG/M2 | HEIGHT: 65 IN | WEIGHT: 173 LBS | OXYGEN SATURATION: 97 % | HEART RATE: 70 BPM | SYSTOLIC BLOOD PRESSURE: 130 MMHG | TEMPERATURE: 97.8 F | DIASTOLIC BLOOD PRESSURE: 68 MMHG

## 2019-05-01 DIAGNOSIS — N18.30 CKD (CHRONIC KIDNEY DISEASE) STAGE 3, GFR 30-59 ML/MIN (HCC): ICD-10-CM

## 2019-05-01 DIAGNOSIS — Z79.4 TYPE 2 DIABETES MELLITUS WITH HYPOGLYCEMIA WITHOUT COMA, WITH LONG-TERM CURRENT USE OF INSULIN (HCC): ICD-10-CM

## 2019-05-01 DIAGNOSIS — E11.649 TYPE 2 DIABETES MELLITUS WITH HYPOGLYCEMIA WITHOUT COMA, WITH LONG-TERM CURRENT USE OF INSULIN (HCC): ICD-10-CM

## 2019-05-01 DIAGNOSIS — I10 ESSENTIAL HYPERTENSION: ICD-10-CM

## 2019-05-01 PROCEDURE — 99214 OFFICE O/P EST MOD 30 MIN: CPT | Performed by: INTERNAL MEDICINE

## 2019-05-01 ASSESSMENT — ENCOUNTER SYMPTOMS
INSOMNIA: 1
NAUSEA: 0
VOMITING: 0
CHILLS: 0
HYPERTENSION: 1
COUGH: 0
NERVOUS/ANXIOUS: 1
SHORTNESS OF BREATH: 0
FEVER: 0

## 2019-05-01 NOTE — PROGRESS NOTES
"Subjective:      Pam Hernandez is a 73 y.o. female who presents with Hypertension and Chronic Kidney Disease            Patient was recently hospitalized with what sounds like hypoglycemic event.  Patient is doing better however she is very anxious.      Hypertension   This is a chronic problem. The current episode started more than 1 year ago. The problem is unchanged. The problem is controlled. Pertinent negatives include no chest pain, malaise/fatigue, peripheral edema or shortness of breath. Risk factors for coronary artery disease include diabetes mellitus and post-menopausal state. Past treatments include ACE inhibitors, diuretics and calcium channel blockers. The current treatment provides significant improvement. There are no compliance problems.  Hypertensive end-organ damage includes kidney disease. Identifiable causes of hypertension include chronic renal disease.   Chronic Kidney Disease   This is a chronic problem. The current episode started more than 1 year ago. The problem occurs constantly. The problem has been unchanged. Pertinent negatives include no chest pain, chills, coughing, fever, nausea, urinary symptoms or vomiting.       Review of Systems   Constitutional: Negative for chills, fever and malaise/fatigue.   Respiratory: Negative for cough and shortness of breath.    Cardiovascular: Negative for chest pain and leg swelling.   Gastrointestinal: Negative for nausea and vomiting.   Genitourinary: Negative for dysuria, frequency and urgency.   Psychiatric/Behavioral: The patient is nervous/anxious and has insomnia.           Objective:     /68 (BP Location: Right arm, Patient Position: Sitting)   Pulse 70   Temp 36.6 °C (97.8 °F)   Resp 16   Ht 1.651 m (5' 5\")   Wt 78.5 kg (173 lb)   LMP  (LMP Unknown)   SpO2 97%   BMI 28.79 kg/m²      Physical Exam   Constitutional: She is oriented to person, place, and time. She appears well-developed and well-nourished. No distress.   HENT: "   Head: Normocephalic and atraumatic.   Right Ear: External ear normal.   Left Ear: External ear normal.   Nose: Nose normal.   Eyes: Conjunctivae are normal. Right eye exhibits no discharge. Left eye exhibits no discharge. No scleral icterus.   Cardiovascular: Normal rate and regular rhythm.    Pulmonary/Chest: Effort normal and breath sounds normal. No respiratory distress.   Musculoskeletal: She exhibits no edema.   Neurological: She is alert and oriented to person, place, and time.   Skin: Skin is warm. She is not diaphoretic.   Psychiatric: She has a normal mood and affect. Her behavior is normal.   Nursing note and vitals reviewed.    Past Medical History:   Diagnosis Date   • Acid reflux    • Anemia    • Anesthesia     reports wants sleep, hard to wake up   • Arthritis     fingers/hands   • At risk for falling    • Cataract     removed Dr.Stanko benjamín   • Dental disorder     lower partial/upper denture   • Diabetes     oral meds and insulin dependent   • Glaucoma     bilat   • Hypertension    • Hypothyroid 12/10/2018    on no meds at this time   • Neuropathy (HCC)    • Pneumonia     2015   • Snoring     no sleep study   • Urinary frequency    • Urinary incontinence      Social History     Social History   • Marital status:      Spouse name: N/A   • Number of children: N/A   • Years of education: N/A     Occupational History   • Not on file.     Social History Main Topics   • Smoking status: Never Smoker   • Smokeless tobacco: Never Used   • Alcohol use No   • Drug use: No   • Sexual activity: Not on file     Other Topics Concern   • Not on file     Social History Narrative   • No narrative on file     History reviewed. No pertinent family history.  Recent Labs      01/22/19   1824   01/23/19   0801   01/24/19   0155   01/25/19   0316  01/26/19   0346  04/10/19   1339   ALBUMIN  4.1   --    --    --   3.3   --    --    --   4.2   SODIUM  133*   < >   --    < >  133*   < >  133*  135  136   POTASSIUM   6.1*   < >   --    < >  4.6   < >  5.0  4.4  4.0   CHLORIDE  109   < >   --    < >  98   < >  98  100  102   CO2  18*   < >   --    < >  28   < >  29  28  25   BUN  30*   < >   --    < >  27*   < >  26*  27*  31*   CREATININE  2.09*   < >   --    < >  2.08*   < >  1.93*  1.82*  1.85*   PHOSPHORUS   --    --   4.3   --   4.1   --    --    --    --     < > = values in this interval not displayed.               Assessment/Plan:     1. CKD (chronic kidney disease) stage 3, GFR 30-59 ml/min ([facility])  Stable  No uremic symptoms  Renal dose of medication  Avoid nephrotoxins  Continue same medication regimen      2. Essential hypertension  Controlled  Continue same medication regimen  Continue low-sodium diet      3. Type 2 diabetes mellitus with hypoglycemia without coma, with long-term current use of insulin (HCC)  Consider decreasing the dose of insulin  - REFERRAL TO ENDOCRINOLOGY  - Nutrition (Dietary) Consult

## 2019-05-09 ENCOUNTER — PATIENT OUTREACH (OUTPATIENT)
Dept: HEALTH INFORMATION MANAGEMENT | Facility: OTHER | Age: 74
End: 2019-05-09

## 2019-05-13 NOTE — PROGRESS NOTES
Patient  Pam Hernandez was admitted to [facility] on 04/10/2019 for Slurred Speech  and was discharged on 04/11/2019. Patient was instructed to follow up with her Primary Care Physician Dr. uJan R Solis on 04/18. [facility] confirmed  three follow up appointments, 1- Primary Care Physician with Dr. Juan R Solis on 04/18, 1- Nephrology follow up on 05/01, and 1- Endocrinology appointment on 05/09. Patient only kept  two of these three appointments. [facility] was able to assist  patient with rescheduling missed endocrinology follow up appointment.  Patient has 1 future endocrinology follow up appointment on 06/12/19.Patient was discharged with a high lace score, therefore, a PPS screening was conducted where the patient was scored at a 70%.

## 2019-06-17 ENCOUNTER — OFFICE VISIT (OUTPATIENT)
Dept: ENDOCRINOLOGY | Facility: MEDICAL CENTER | Age: 74
End: 2019-06-17
Payer: MEDICARE

## 2019-06-17 VITALS
HEIGHT: 65 IN | DIASTOLIC BLOOD PRESSURE: 70 MMHG | BODY MASS INDEX: 29.32 KG/M2 | WEIGHT: 176 LBS | SYSTOLIC BLOOD PRESSURE: 136 MMHG | HEART RATE: 71 BPM | OXYGEN SATURATION: 98 %

## 2019-06-17 DIAGNOSIS — Z79.4 TYPE 2 DIABETES MELLITUS WITH HYPOGLYCEMIA WITHOUT COMA, WITH LONG-TERM CURRENT USE OF INSULIN (HCC): ICD-10-CM

## 2019-06-17 DIAGNOSIS — I10 ESSENTIAL HYPERTENSION: ICD-10-CM

## 2019-06-17 DIAGNOSIS — E11.649 TYPE 2 DIABETES MELLITUS WITH HYPOGLYCEMIA WITHOUT COMA, WITH LONG-TERM CURRENT USE OF INSULIN (HCC): ICD-10-CM

## 2019-06-17 DIAGNOSIS — Z79.4 ENCOUNTER FOR LONG-TERM (CURRENT) USE OF INSULIN (HCC): ICD-10-CM

## 2019-06-17 LAB
HBA1C MFR BLD: 9.5 % (ref 0–5.6)
INT CON NEG: NEGATIVE
INT CON POS: POSITIVE

## 2019-06-17 PROCEDURE — 99204 OFFICE O/P NEW MOD 45 MIN: CPT | Performed by: PHYSICIAN ASSISTANT

## 2019-06-17 PROCEDURE — 83036 HEMOGLOBIN GLYCOSYLATED A1C: CPT | Performed by: PHYSICIAN ASSISTANT

## 2019-06-17 NOTE — PATIENT INSTRUCTIONS
Blood glucose log: Check BG in the morning when wake up,  and before bed.  So two times a day.  Always bring BG diary to the next office visit.     They are on:  1.   Levemir  20 units before bed ONLY  2.   Ozempic (Monday) 0.25 once a week for 3 weeks then INCREASE to 0.5 for three weeks then increase to 1.0. Ozempic can cause nausea and upset stomach feelings but this generally only lasts a day or two.  If this persists longer than 2 weeks and is not tolerable please discontinue the medication and let us know of the issue.  3.   Glimepiride (STOP)    Less pasta, rice, potato and Bread, oats, fruit

## 2019-06-17 NOTE — PROGRESS NOTES
New Patient Consult Note  Referred by: Juan R Solis M.D.    Reason for consult: Diabetes Management Type 2    HPI:  Pam Hernandez is a 73 y.o. old patient who is seeing us today for diabetes care.  This is a pleasant patient with diabetes and I appreciate the opportunity to participate in the care of this patient.  This is a new patient with me today.    Labs of 6/17/2019 HbA1c is 9.5  Labs of 4/10/19 GFR 27    BG Diary:6/17/2019  In the AM:  No log    Has been Diabetic since  Has a Glucagon pen at home: no    1. Type 2 diabetes mellitus with hypoglycemia without coma, with long-term current use of insulin ([facility])  This is a new patient with me on 6/17/2019  They are on:  1.   Levemir  10 units before meals three times a day  2.   Novolog (not taking)  3.   Glimepiride    2. Essential hypertension  This is stable today and no new changes are needed or required in today's visit      3. Encounter for long-term (current) use of insulin ([facility])  Is on a high risk medication Insulin and we will continue to follow       ROS:   Constitutional: No change in weight , No fatigue, No night sweats.  HEENT: No Headache.  Eyes:  No blurred vision, No visual changes.  Cardiac: No chest pain, No palpitations.  Resp: No shortness of breath, No cough,   Gastro: No nausea or vomiting, No diarrhea.  Neuro: Denies numbness or tinging in bilateral feet or hands, and no loss of sensation.  Endo: No heat or cold intolerance.  : No polyuria, No polydipsia, No chronic UTI's.  Lower extremities: No lower leg edema bilateral.  All other systems were reviewed and were negative.    Past Medical History:  Patient Active Problem List    Diagnosis Date Noted   • Hyperkalemia 01/23/2019     Priority: High   • Acute on chronic kidney failure (HCC) 01/23/2019     Priority: Medium   • Elevated TSH 01/24/2019     Priority: Low   • Type 2 diabetes mellitus with hypoglycemia, with long-term current use of insulin ([facility]) 01/23/2019     Priority: Low    • Elevated lipase 01/23/2019     Priority: Low   • Hyponatremia 01/23/2019     Priority: Low   • RUQ pain 01/23/2019     Priority: Low   • Essential hypertension 01/23/2019     Priority: Low   • Encounter for long-term (current) use of insulin (HCC) 06/17/2019   • Hypothyroidism 04/10/2019   • Chest pain 04/27/2016       Past Surgical History:  Past Surgical History:   Procedure Laterality Date   • VITRECTOMY POSTERIOR Right 12/11/2018    Procedure: VITRECTOMY POSTERIOR-  LASER;  Surgeon: Katina Bolaños M.D.;  Location: SURGERY SAME DAY [facility];  Service: Ophthalmology   • EYE SURGERY Left 2018    for glaucoma   • CATARACT EXTRACTION WITH IOL Bilateral    • US-NEEDLE CORE BX-BREAST PANEL         Allergies:  Patient has no known allergies.    Social History:  Social History     Social History   • Marital status:      Spouse name: N/A   • Number of children: N/A   • Years of education: N/A     Occupational History   • Not on file.     Social History Main Topics   • Smoking status: Never Smoker   • Smokeless tobacco: Never Used   • Alcohol use No   • Drug use: No   • Sexual activity: Not on file     Other Topics Concern   • Not on file     Social History Narrative   • No narrative on file       Family History:  History reviewed. No pertinent family history.    Medications:    Current Outpatient Prescriptions:   •  hydroCHLOROthiazide (HYDRODIURIL) 12.5 MG tablet, Take 12.5 mg by mouth every day., Disp: , Rfl:   •  lisinopril (PRINIVIL) 10 MG Tab, Take 10 mg by mouth every day., Disp: , Rfl:   •  prednisoLONE acetate (PRED FORTE) 1 % Suspension, Place 1 Drop in left eye 4 times a day., Disp: , Rfl:   •  levothyroxine (SYNTHROID) 25 MCG Tab, Take 25 mcg by mouth Every morning on an empty stomach., Disp: , Rfl:   •  potassium chloride SA (KDUR) 20 MEQ Tab CR, Take 20 mEq by mouth 1 time daily as needed. With Lasix , Disp: , Rfl:   •  furosemide (LASIX) 20 MG Tab, Take 20 mg by mouth 1 time daily as  "needed. With Potassium , Disp: , Rfl:   •  insulin detemir (LEVEMIR) 100 UNIT/ML Solution, Inject 30 Units as instructed every day., Disp: , Rfl:   •  amLODIPine (NORVASC) 10 MG Tab, Take 1 Tab by mouth every day., Disp: 30 Tab, Rfl: 0  •  calcium carbonate-vitamin D ([employer] OYSTER SHELL CALCIUM-VIT D) 500-200 MG-UNIT Tab, Take 1 Tab by mouth 2 Times a Day., Disp: , Rfl:   •  raNITidine (ZANTAC) 150 MG Tab, Take 150 mg by mouth as needed., Disp: , Rfl:   •  ferrous sulfate 325 (65 Fe) MG tablet, Take 325 mg by mouth 2 Times a Day., Disp: , Rfl:   •  omeprazole (PRILOSEC) 20 MG delayed-release capsule, Take 20 mg by mouth every morning., Disp: , Rfl:   •  aspirin EC (ECOTRIN) 81 MG TBEC, Take 81 mg by mouth every day., Disp: , Rfl:   •  gabapentin (NEURONTIN) 300 MG CAPS, Take 300 mg by mouth 3 times a day., Disp: , Rfl:   •  Cholecalciferol (VITAMIN D) 2000 UNIT TABS, Take 2,000 Units by mouth every day., Disp: , Rfl:   •  latanoprost (XALATAN) 0.005 % SOLN, Place 1 Drop in both eyes every evening., Disp: , Rfl:       Physical Examination:   Vital signs: /70 (BP Location: Left arm, Patient Position: Sitting)   Pulse 71   Ht 1.651 m (5' 5\")   Wt 79.8 kg (176 lb)   LMP  (LMP Unknown)   SpO2 98%   BMI 29.29 kg/m²   General: No distress, cooperative, well dressed and well nourished.   Eyes: No scleral icterus or discharge, No hyposphagma  ENMT: Normal on external inspection of nose, lips, No nasal drainage   Neck: No abnormal masses on inspection  Resp: Normal effort, Bilateral clear to auscultation, No wheezing, No rales  CVS: Regular rate and rhythm, S1 S2 normal, No murmur. No gallop  Extremities: No edema bilateral extremities  Neuro: Alert and oriented  Skin: No rash, No Ulcers  Psych: Normal mood and affect      Assessment and Plan:    1. Type 2 diabetes mellitus with hypoglycemia without coma, with long-term current use of insulin ([facility])    They are on:  1.   Levemir  20 units before bed ONLY  2.   " Ozempic 0.25 once a week for 3 weeks then INCREASE to 0.5 for three weeks then increase to 1.0. Ozempic can cause nausea and upset stomach feelings but this generally only lasts a day or two.  If this persists longer than 2 weeks and is not tolerable please discontinue the medication and let us know of the issue.  3.   Glimepiride (STOP)      2. Essential hypertension  This is stable today and no new changes are needed or required in today's visit      3. Encounter for long-term (current) use of insulin (HCC)  Is on a high risk medication Insulin and we will continue to follow     Return in about 1 month (around 7/17/2019).    This patient during there office visit was started on new medication Ozempic.  Side effects of new medications were discussed with the patient today in the office. The patient was supplied paperwork on this new medication.    Thank you kindly for allowing me to participate in the diabetes care plan for this patient.    Ermias Coleman PA-C, BC-[facility]  Board Certified - Advanced Diabetes Management  06/17/19    CC:   Juan R Solis M.D.

## 2019-07-23 ENCOUNTER — TELEPHONE (OUTPATIENT)
Dept: ENDOCRINOLOGY | Facility: MEDICAL CENTER | Age: 74
End: 2019-07-23

## 2019-07-23 ENCOUNTER — OFFICE VISIT (OUTPATIENT)
Dept: ENDOCRINOLOGY | Facility: MEDICAL CENTER | Age: 74
End: 2019-07-23
Payer: MEDICARE

## 2019-07-23 VITALS
BODY MASS INDEX: 27.82 KG/M2 | HEART RATE: 74 BPM | OXYGEN SATURATION: 97 % | WEIGHT: 167 LBS | HEIGHT: 65 IN | DIASTOLIC BLOOD PRESSURE: 70 MMHG | SYSTOLIC BLOOD PRESSURE: 132 MMHG

## 2019-07-23 DIAGNOSIS — Z79.4 TYPE 2 DIABETES MELLITUS WITH HYPOGLYCEMIA WITHOUT COMA, WITH LONG-TERM CURRENT USE OF INSULIN (HCC): ICD-10-CM

## 2019-07-23 DIAGNOSIS — E11.649 TYPE 2 DIABETES MELLITUS WITH HYPOGLYCEMIA WITHOUT COMA, WITH LONG-TERM CURRENT USE OF INSULIN (HCC): ICD-10-CM

## 2019-07-23 DIAGNOSIS — I10 ESSENTIAL HYPERTENSION: ICD-10-CM

## 2019-07-23 PROCEDURE — 99214 OFFICE O/P EST MOD 30 MIN: CPT | Performed by: PHYSICIAN ASSISTANT

## 2019-07-23 RX ORDER — LIRAGLUTIDE 6 MG/ML
1.8 INJECTION SUBCUTANEOUS DAILY
Qty: 3 PEN | Refills: 6 | Status: SHIPPED | OUTPATIENT
Start: 2019-07-23 | End: 2019-10-16

## 2019-07-23 NOTE — PROGRESS NOTES
Return to office Patient Consult Note  Referred by: Juan R Solis M.D.    Reason for consult: Diabetes Management Type 2    HPI:  Pam Hernandez is a 73 y.o. old patient who is seeing us today for diabetes care.  This is a pleasant patient with diabetes and I appreciate the opportunity to participate in the care of this patient.      Labs of 6/17/2019 HbA1c is 9.5  Labs of 4/10/19 GFR 27       BG Diary:7/23/2019  In the AM:       1. Type 2 diabetes mellitus with hypoglycemia without coma, with long-term current use of insulin (HCC)  This is a new patient with me on 6/17/2019  They were on:  1.   Levemir  10 units before meals three times a day  2.   Novolog (not taking)  3.   Glimepiride       They are on:  1.   Levemir  20 units before bed ONLY  2.   Ozempic 0.5 once a week   3.   Glimepiride (STOP)    2. Essential hypertension  Is on a high risk medication Insulin and we will continue to follow     ROS:   Constitutional: No night sweats.  Eyes:  No visual changes.  Cardiac: No chest pain, No palpitations or racing heart rate.  Resp: No shortness of breath, No cough,   Gi: No Diarrhea    All other systems were reviewed and were/are negative.      Past Medical History:  Patient Active Problem List    Diagnosis Date Noted   • Hyperkalemia 01/23/2019     Priority: High   • Acute on chronic kidney failure (HCC) 01/23/2019     Priority: Medium   • Elevated TSH 01/24/2019     Priority: Low   • Type 2 diabetes mellitus with hypoglycemia, with long-term current use of insulin ([facility]) 01/23/2019     Priority: Low   • Elevated lipase 01/23/2019     Priority: Low   • Hyponatremia 01/23/2019     Priority: Low   • RUQ pain 01/23/2019     Priority: Low   • Essential hypertension 01/23/2019     Priority: Low   • Encounter for long-term (current) use of insulin (HCC) 06/17/2019   • Hypothyroidism 04/10/2019   • Chest pain 04/27/2016       Past Surgical History:  Past Surgical History:   Procedure Laterality Date   •  VITRECTOMY POSTERIOR Right 12/11/2018    Procedure: VITRECTOMY POSTERIOR-  LASER;  Surgeon: Katina Bolaños M.D.;  Location: SURGERY SAME DAY [facility];  Service: Ophthalmology   • EYE SURGERY Left 2018    for glaucoma   • CATARACT EXTRACTION WITH IOL Bilateral    • US-NEEDLE CORE BX-BREAST PANEL         Allergies:  Patient has no known allergies.    Social History:  Social History     Social History   • Marital status:      Spouse name: N/A   • Number of children: N/A   • Years of education: N/A     Occupational History   • Not on file.     Social History Main Topics   • Smoking status: Never Smoker   • Smokeless tobacco: Never Used   • Alcohol use No   • Drug use: No   • Sexual activity: Not on file     Other Topics Concern   • Not on file     Social History Narrative   • No narrative on file       Family History:  History reviewed. No pertinent family history.    Medications:    Current Outpatient Prescriptions:   •  hydroCHLOROthiazide (HYDRODIURIL) 12.5 MG tablet, Take 12.5 mg by mouth every day., Disp: , Rfl:   •  lisinopril (PRINIVIL) 10 MG Tab, Take 10 mg by mouth every day., Disp: , Rfl:   •  prednisoLONE acetate (PRED FORTE) 1 % Suspension, Place 1 Drop in left eye 4 times a day., Disp: , Rfl:   •  levothyroxine (SYNTHROID) 25 MCG Tab, Take 25 mcg by mouth Every morning on an empty stomach., Disp: , Rfl:   •  potassium chloride SA (KDUR) 20 MEQ Tab CR, Take 20 mEq by mouth 1 time daily as needed. With Lasix , Disp: , Rfl:   •  furosemide (LASIX) 20 MG Tab, Take 20 mg by mouth 1 time daily as needed. With Potassium , Disp: , Rfl:   •  insulin detemir (LEVEMIR) 100 UNIT/ML Solution, Inject 30 Units as instructed every day., Disp: , Rfl:   •  amLODIPine (NORVASC) 10 MG Tab, Take 1 Tab by mouth every day., Disp: 30 Tab, Rfl: 0  •  calcium carbonate-vitamin D ([employer] OYSTER SHELL CALCIUM-VIT D) 500-200 MG-UNIT Tab, Take 1 Tab by mouth 2 Times a Day., Disp: , Rfl:   •  raNITidine (ZANTAC) 150 MG Tab,  "Take 150 mg by mouth as needed., Disp: , Rfl:   •  ferrous sulfate 325 (65 Fe) MG tablet, Take 325 mg by mouth 2 Times a Day., Disp: , Rfl:   •  omeprazole (PRILOSEC) 20 MG delayed-release capsule, Take 20 mg by mouth every morning., Disp: , Rfl:   •  aspirin EC (ECOTRIN) 81 MG TBEC, Take 81 mg by mouth every day., Disp: , Rfl:   •  gabapentin (NEURONTIN) 300 MG CAPS, Take 300 mg by mouth 3 times a day., Disp: , Rfl:   •  Cholecalciferol (VITAMIN D) 2000 UNIT TABS, Take 2,000 Units by mouth every day., Disp: , Rfl:   •  latanoprost (XALATAN) 0.005 % SOLN, Place 1 Drop in both eyes every evening., Disp: , Rfl:         Physical Examination:   Vital signs: /70 (BP Location: Left arm, Patient Position: Sitting)   Pulse 74   Ht 1.651 m (5' 5\")   Wt 75.8 kg (167 lb)   LMP  (LMP Unknown)   SpO2 97%   BMI 27.79 kg/m²   General: No distress, cooperative, well dressed and well nourished.   Eyes: No scleral icterus or discharge, No hyposphagma  ENMT: Normal on external inspection of nose, lips, No nasal drainage   Neck: No abnormal masses on inspection  Resp: Normal effort, Bilateral clear to auscultation, No wheezing, No rales  CVS: Regular rate and rhythm, S1 S2 normal, No murmur. No gallop  Extremities: No edema bilateral extremities  Neuro: Alert and oriented  Skin: No rash, No Ulcers  Psych: Normal mood and affect      Assessment and Plan:    1. Type 2 diabetes mellitus with hypoglycemia without coma, with long-term current use of insulin (HCC)     They are on:  1.   Levemir  25 units before bed ONLY  2.   Ozempic 0.5 once a week (on 8/6/19 INCREASE to 1.0 (so 0.5 + 0.5 or two shots))  3.   Glimepiride (STOP)    2. Essential hypertension  Is on a high risk medication Insulin and we will continue to follow     Return in about 6 weeks (around 9/3/2019).      Thank you kindly for allowing me to participate in the diabetes care plan for this patient.    Ermias Coleman PA-C, BC-ADM  Board Certified - Advanced " Diabetes Management  07/23/19    CC:   Juan R Solis M.D.

## 2019-07-23 NOTE — PATIENT INSTRUCTIONS
They are on:  1.   Levemir  25 units before bed ONLY  2.   Ozempic 0.5 once a week (on 8/6/19 INCREASE to 1.0 (so 0.5 + 0.5 or two shots))

## 2019-07-23 NOTE — TELEPHONE ENCOUNTER
Pharmacy called stating they do not have Ozempic but they do carry Trulicity and Victoza. Pharmacy is requesting new Rx with alternative mediations besides Ozempic. Please advise.

## 2019-08-01 ENCOUNTER — TELEPHONE (OUTPATIENT)
Dept: ENDOCRINOLOGY | Facility: MEDICAL CENTER | Age: 74
End: 2019-08-01

## 2019-08-01 NOTE — TELEPHONE ENCOUNTER
VOICEMAIL    1. Caller Name: Bonny with the [facility]                         Call Back Number: [phone] Ext. [phone]    2. Message: VM left asking if patient is on Ozempic or Victoza.     3. Patient approves office to leave a detailed voicemail/MyChart message: N\A

## 2019-08-01 NOTE — TELEPHONE ENCOUNTER
Phone Number Called: [phone] Ext. [phone]    Message: Left vm for Bonny stating we received a message from the pharmacy stating they do not carry Ozempic. They do carry Trulicity or Victoza. That is why a new rx was faxed over for the Victoza.    Left Message for patient to call back: no

## 2019-09-05 ENCOUNTER — APPOINTMENT (OUTPATIENT)
Dept: ENDOCRINOLOGY | Facility: MEDICAL CENTER | Age: 74
End: 2019-09-05
Payer: MEDICARE

## 2019-09-05 ENCOUNTER — HOSPITAL ENCOUNTER (EMERGENCY)
Facility: MEDICAL CENTER | Age: 74
End: 2019-09-05
Attending: EMERGENCY MEDICINE
Payer: MEDICARE

## 2019-09-05 VITALS
BODY MASS INDEX: 29.44 KG/M2 | TEMPERATURE: 97 F | WEIGHT: 160 LBS | DIASTOLIC BLOOD PRESSURE: 77 MMHG | HEIGHT: 62 IN | OXYGEN SATURATION: 98 % | SYSTOLIC BLOOD PRESSURE: 143 MMHG | HEART RATE: 68 BPM | RESPIRATION RATE: 15 BRPM

## 2019-09-05 DIAGNOSIS — R73.9 HYPERGLYCEMIA: ICD-10-CM

## 2019-09-05 DIAGNOSIS — R53.1 GENERALIZED WEAKNESS: ICD-10-CM

## 2019-09-05 DIAGNOSIS — N39.0 URINARY TRACT INFECTION WITHOUT HEMATURIA, SITE UNSPECIFIED: ICD-10-CM

## 2019-09-05 LAB
ALBUMIN SERPL BCP-MCNC: 4.2 G/DL (ref 3.2–4.9)
ALBUMIN/GLOB SERPL: 1.2 G/DL
ALP SERPL-CCNC: 93 U/L (ref 30–99)
ALT SERPL-CCNC: 9 U/L (ref 2–50)
ANION GAP SERPL CALC-SCNC: 10 MMOL/L (ref 0–11.9)
APPEARANCE UR: ABNORMAL
AST SERPL-CCNC: 15 U/L (ref 12–45)
BACTERIA #/AREA URNS HPF: ABNORMAL /HPF
BASOPHILS # BLD AUTO: 0.9 % (ref 0–1.8)
BASOPHILS # BLD: 0.07 K/UL (ref 0–0.12)
BILIRUB SERPL-MCNC: 0.7 MG/DL (ref 0.1–1.5)
BILIRUB UR QL STRIP.AUTO: NEGATIVE
BLOOD CULTURE HOLD CXBCH: NORMAL
BUN SERPL-MCNC: 17 MG/DL (ref 8–22)
CALCIUM SERPL-MCNC: 9.4 MG/DL (ref 8.5–10.5)
CHLORIDE SERPL-SCNC: 101 MMOL/L (ref 96–112)
CO2 SERPL-SCNC: 26 MMOL/L (ref 20–33)
COLOR UR: YELLOW
CREAT SERPL-MCNC: 1.57 MG/DL (ref 0.5–1.4)
EOSINOPHIL # BLD AUTO: 0.1 K/UL (ref 0–0.51)
EOSINOPHIL NFR BLD: 1.3 % (ref 0–6.9)
EPI CELLS #/AREA URNS HPF: ABNORMAL /HPF
ERYTHROCYTE [DISTWIDTH] IN BLOOD BY AUTOMATED COUNT: 40.5 FL (ref 35.9–50)
GLOBULIN SER CALC-MCNC: 3.4 G/DL (ref 1.9–3.5)
GLUCOSE SERPL-MCNC: 288 MG/DL (ref 65–99)
GLUCOSE UR STRIP.AUTO-MCNC: >=1000 MG/DL
HCT VFR BLD AUTO: 43.1 % (ref 37–47)
HGB BLD-MCNC: 14.7 G/DL (ref 12–16)
HYALINE CASTS #/AREA URNS LPF: ABNORMAL /LPF
IMM GRANULOCYTES # BLD AUTO: 0.02 K/UL (ref 0–0.11)
IMM GRANULOCYTES NFR BLD AUTO: 0.3 % (ref 0–0.9)
KETONES UR STRIP.AUTO-MCNC: NEGATIVE MG/DL
LEUKOCYTE ESTERASE UR QL STRIP.AUTO: ABNORMAL
LYMPHOCYTES # BLD AUTO: 1.57 K/UL (ref 1–4.8)
LYMPHOCYTES NFR BLD: 21 % (ref 22–41)
MCH RBC QN AUTO: 29.7 PG (ref 27–33)
MCHC RBC AUTO-ENTMCNC: 34.1 G/DL (ref 33.6–35)
MCV RBC AUTO: 87.1 FL (ref 81.4–97.8)
MICRO URNS: ABNORMAL
MONOCYTES # BLD AUTO: 0.38 K/UL (ref 0–0.85)
MONOCYTES NFR BLD AUTO: 5.1 % (ref 0–13.4)
NEUTROPHILS # BLD AUTO: 5.35 K/UL (ref 2–7.15)
NEUTROPHILS NFR BLD: 71.4 % (ref 44–72)
NITRITE UR QL STRIP.AUTO: POSITIVE
NRBC # BLD AUTO: 0 K/UL
NRBC BLD-RTO: 0 /100 WBC
PH UR STRIP.AUTO: 7.5 [PH] (ref 5–8)
PLATELET # BLD AUTO: 230 K/UL (ref 164–446)
PMV BLD AUTO: 10 FL (ref 9–12.9)
POTASSIUM SERPL-SCNC: 3.6 MMOL/L (ref 3.6–5.5)
PROT SERPL-MCNC: 7.6 G/DL (ref 6–8.2)
PROT UR QL STRIP: 300 MG/DL
RBC # BLD AUTO: 4.95 M/UL (ref 4.2–5.4)
RBC # URNS HPF: ABNORMAL /HPF
RBC UR QL AUTO: ABNORMAL
RENAL EPI CELLS #/AREA URNS HPF: ABNORMAL /HPF
SODIUM SERPL-SCNC: 137 MMOL/L (ref 135–145)
SP GR UR STRIP.AUTO: 1.02
TRANS CELLS #/AREA URNS HPF: ABNORMAL /HPF
UROBILINOGEN UR STRIP.AUTO-MCNC: 1 MG/DL
WBC # BLD AUTO: 7.5 K/UL (ref 4.8–10.8)
WBC #/AREA URNS HPF: ABNORMAL /HPF

## 2019-09-05 PROCEDURE — 99285 EMERGENCY DEPT VISIT HI MDM: CPT

## 2019-09-05 PROCEDURE — 80053 COMPREHEN METABOLIC PANEL: CPT

## 2019-09-05 PROCEDURE — 87077 CULTURE AEROBIC IDENTIFY: CPT

## 2019-09-05 PROCEDURE — 81001 URINALYSIS AUTO W/SCOPE: CPT

## 2019-09-05 PROCEDURE — 87186 SC STD MICRODIL/AGAR DIL: CPT

## 2019-09-05 PROCEDURE — 36415 COLL VENOUS BLD VENIPUNCTURE: CPT

## 2019-09-05 PROCEDURE — 85025 COMPLETE CBC W/AUTO DIFF WBC: CPT

## 2019-09-05 PROCEDURE — 700111 HCHG RX REV CODE 636 W/ 250 OVERRIDE (IP): Performed by: EMERGENCY MEDICINE

## 2019-09-05 PROCEDURE — 96365 THER/PROPH/DIAG IV INF INIT: CPT

## 2019-09-05 PROCEDURE — 700105 HCHG RX REV CODE 258: Performed by: EMERGENCY MEDICINE

## 2019-09-05 PROCEDURE — 87086 URINE CULTURE/COLONY COUNT: CPT

## 2019-09-05 RX ORDER — GABAPENTIN 300 MG/1
300 CAPSULE ORAL ONCE
Status: DISCONTINUED | OUTPATIENT
Start: 2019-09-05 | End: 2019-09-05 | Stop reason: HOSPADM

## 2019-09-05 RX ORDER — CEFDINIR 300 MG/1
300 CAPSULE ORAL 2 TIMES DAILY
Qty: 12 CAP | Refills: 0 | Status: SHIPPED | OUTPATIENT
Start: 2019-09-06 | End: 2019-09-12

## 2019-09-05 RX ADMIN — CEFTRIAXONE SODIUM 1 G: 1 INJECTION, POWDER, FOR SOLUTION INTRAMUSCULAR; INTRAVENOUS at 12:51

## 2019-09-05 NOTE — ED NOTES
Patient given discharge instructions and Rx. Questions answered. Offered wheelchair ride out, declined.

## 2019-09-05 NOTE — ED PROVIDER NOTES
ED Provider Note    CHIEF COMPLAINT  Chief Complaint   Patient presents with   • Weakness       HPI  Pam Hernandez is a 74 y.o. female who presents after being seen by clinician at her health clinic, told she may have urinary tract infection.  Patient had elevated blood sugar at their office also of concern.  She feels generally weak.  She denies dysuria.  She has been having weakness for the past 12 hours, onset this morning.  No vomiting, no diarrhea.  No skin rash, no new leg swelling.  Patient has a history of diabetes.  She complains of neuropathic pain from her diabetic neuropathy, having been out of her Neurontin recently.  She filled her bottles today however.    REVIEW OF SYSTEMS  Constitutional: Generalized weakness, no fever  Respiratory: No shortness of breath  Cardiac: No chest pain or syncope  Gastrointestinal: No abdominal pain, vomiting  Musculoskeletal: Flank pain  Endocrine: Diabetes  Neurologic: No headache  Genitourinary: Patient told she had urinary tract infection  Eyes: History of glaucoma       All other systems are negative.     PAST MEDICAL HISTORY  Past Medical History:   Diagnosis Date   • Acid reflux    • Anemia    • Anesthesia     reports wants sleep, hard to wake up   • Arthritis     fingers/hands   • At risk for falling    • Cataract     removed Dr.Stanko benjamín   • Dental disorder     lower partial/upper denture   • Diabetes     oral meds and insulin dependent   • Glaucoma     bilat   • Hypertension    • Hypothyroid 12/10/2018    on no meds at this time   • Neuropathy (HCC)    • Pneumonia     2015   • Snoring     no sleep study   • Urinary frequency    • Urinary incontinence        FAMILY HISTORY  History reviewed. No pertinent family history.    SOCIAL HISTORY  Social History     Socioeconomic History   • Marital status:      Spouse name: Not on file   • Number of children: Not on file   • Years of education: Not on file   • Highest education level: Not on file  "  Occupational History   • Not on file   Social Needs   • Financial resource strain: Not on file   • Food insecurity:     Worry: Not on file     Inability: Not on file   • Transportation needs:     Medical: Not on file     Non-medical: Not on file   Tobacco Use   • Smoking status: Never Smoker   • Smokeless tobacco: Never Used   Substance and Sexual Activity   • Alcohol use: No   • Drug use: No   • Sexual activity: Not on file   Lifestyle   • Physical activity:     Days per week: Not on file     Minutes per session: Not on file   • Stress: Not on file   Relationships   • Social connections:     Talks on phone: Not on file     Gets together: Not on file     Attends Congregation service: Not on file     Active member of club or organization: Not on file     Attends meetings of clubs or organizations: Not on file     Relationship status: Not on file   • Intimate partner violence:     Fear of current or ex partner: Not on file     Emotionally abused: Not on file     Physically abused: Not on file     Forced sexual activity: Not on file   Other Topics Concern   • Not on file   Social History Narrative   • Not on file       SURGICAL HISTORY  Past Surgical History:   Procedure Laterality Date   • VITRECTOMY POSTERIOR Right 12/11/2018    Procedure: VITRECTOMY POSTERIOR-  LASER;  Surgeon: Katina Bolaños M.D.;  Location: SURGERY SAME DAY [facility];  Service: Ophthalmology   • EYE SURGERY Left 2018    for glaucoma   • CATARACT EXTRACTION WITH IOL Bilateral    • US-NEEDLE CORE BX-BREAST PANEL         CURRENT MEDICATIONS  Home Medications    **Home medications have not yet been reviewed for this encounter**         ALLERGIES  No Known Allergies    PHYSICAL EXAM  VITAL SIGNS: /71   Pulse 71   Temp 36.1 °C (97 °F) (Temporal)   Resp 18   Ht 1.575 m (5' 2\")   Wt 72.6 kg (160 lb)   LMP  (LMP Unknown)   SpO2 98%   BMI 29.26 kg/m²   Constitutional: Well-nourished, no distress  ENT: Nares clear, mucous membranes " moist.  Eyes:  Conjunctiva normal, No discharge.    Lymphatic: No adenopathy.   Cardiovascular: Normal heart rate, Normal rhythm.   Pulmonary: No wheezing, no rales  Gastrointestinal: Abdomen is soft, nontender, no guarding.  Skin: Warm, Dry, No jaundice.   Musculoskeletal:  No CVA tenderness.   Neurologic:  Normal motor and sensory function, No focal deficits noted.   Psychiatric:Normal affect, Normal mood.    RADIOLOGY/PROCEDURES/Labs  Results for orders placed or performed during the hospital encounter of 09/05/19   URINALYSIS,CULTURE IF INDICATED   Result Value Ref Range    Color Yellow     Character Cloudy (A)     Specific Gravity 1.017 <1.035    Ph 7.5 5.0 - 8.0    Glucose >=1000 (A) Negative mg/dL    Ketones Negative Negative mg/dL    Protein 300 (A) Negative mg/dL    Bilirubin Negative Negative    Urobilinogen, Urine 1.0 Negative    Nitrite Positive (A) Negative    Leukocyte Esterase Small (A) Negative    Occult Blood Trace (A) Negative    Micro Urine Req Microscopic    URINE MICROSCOPIC (W/UA)   Result Value Ref Range    WBC 20-50 (A) /hpf    RBC 2-5 (A) /hpf    Bacteria Many (A) None /hpf    Epithelial Cells Few /hpf    Epithelial Cells Renal Few /hpf    Trans Epithelial Cells Few /hpf    Hyaline Cast 0-2 /lpf   CBC WITH DIFFERENTIAL   Result Value Ref Range    WBC 7.5 4.8 - 10.8 K/uL    RBC 4.95 4.20 - 5.40 M/uL    Hemoglobin 14.7 12.0 - 16.0 g/dL    Hematocrit 43.1 37.0 - 47.0 %    MCV 87.1 81.4 - 97.8 fL    MCH 29.7 27.0 - 33.0 pg    MCHC 34.1 33.6 - 35.0 g/dL    RDW 40.5 35.9 - 50.0 fL    Platelet Count 230 164 - 446 K/uL    MPV 10.0 9.0 - 12.9 fL    Neutrophils-Polys 71.40 44.00 - 72.00 %    Lymphocytes 21.00 (L) 22.00 - 41.00 %    Monocytes 5.10 0.00 - 13.40 %    Eosinophils 1.30 0.00 - 6.90 %    Basophils 0.90 0.00 - 1.80 %    Immature Granulocytes 0.30 0.00 - 0.90 %    Nucleated RBC 0.00 /100 WBC    Neutrophils (Absolute) 5.35 2.00 - 7.15 K/uL    Lymphs (Absolute) 1.57 1.00 - 4.80 K/uL    Monos  (Absolute) 0.38 0.00 - 0.85 K/uL    Eos (Absolute) 0.10 0.00 - 0.51 K/uL    Baso (Absolute) 0.07 0.00 - 0.12 K/uL    Immature Granulocytes (abs) 0.02 0.00 - 0.11 K/uL    NRBC (Absolute) 0.00 K/uL   Comp Metabolic Panel   Result Value Ref Range    Sodium 137 135 - 145 mmol/L    Potassium 3.6 3.6 - 5.5 mmol/L    Chloride 101 96 - 112 mmol/L    Co2 26 20 - 33 mmol/L    Anion Gap 10.0 0.0 - 11.9    Glucose 288 (H) 65 - 99 mg/dL    Bun 17 8 - 22 mg/dL    Creatinine 1.57 (H) 0.50 - 1.40 mg/dL    Calcium 9.4 8.5 - 10.5 mg/dL    AST(SGOT) 15 12 - 45 U/L    ALT(SGPT) 9 2 - 50 U/L    Alkaline Phosphatase 93 30 - 99 U/L    Total Bilirubin 0.7 0.1 - 1.5 mg/dL    Albumin 4.2 3.2 - 4.9 g/dL    Total Protein 7.6 6.0 - 8.2 g/dL    Globulin 3.4 1.9 - 3.5 g/dL    A-G Ratio 1.2 g/dL   ESTIMATED GFR   Result Value Ref Range    GFR If  39 (A) >60 mL/min/1.73 m 2    GFR If Non  32 (A) >60 mL/min/1.73 m 2   Blood Culture,Hold   Result Value Ref Range    Blood Culture Hold Collected          COURSE & MEDICAL DECISION MAKING  Pertinent Labs & Imaging studies reviewed. (See chart for details)  Patient shows no evidence of DKA, blood sugar at 288 likely slightly elevated secondary to the stress of ongoing infection.  She was given IV Rocephin, will continue on Omnicef at home for a total of 6 days of treatment.  She states she has her own medications for treatment of her hyperglycemia.  Generalized weakness likely secondary to infection.  Patient was discharged in good condition, has agreed to return if worse or for any other concerns  Dose of Neurontin was ordered for the patient's neuropathy pain, there was a delay and ultimately the patient took on her own tablets.    FINAL IMPRESSION  1. Generalized weakness     2. Urinary tract infection without hematuria, site unspecified     3. Hyperglycemia             Electronically signed by: Otis Tan, 9/5/2019 2:10 PM

## 2019-09-05 NOTE — LETTER
9/10/2019               Pam Hernandez  [address] [facility] [address]        Dear Pam (MR#[id number])    This letter is sent in regards to your, recent visit to the [facility] Emergency Department on 9/5/2019.  During the visit, tests were performed to assist the physician in a medical diagnosis.  A review of those tests requires that we notify you of the following:    Your urine culture was POSITIVE for a bacteria called Escherichia coli. The antibiotic prescribed for you (cefdinir) should be active to treat this bacteria. IT IS IMPORTANT THAT YOU CONTINUE TAKING YOUR ANTIBIOTIC UNTIL IT IS FINISHED.      Please feel free to contact me at the number below if you have any questions or concerns. Thank you for your cooperation in the matter.    Sincerely,  ED Culture Follow-Up Staff  Ana Pardo, PharmD    [facility], Emergency Department  [address] [place] [id number]  [phone] (ED Culture Line)  [phone]

## 2019-09-05 NOTE — ED TRIAGE NOTES
Patient drove to [facility] clinic this am c/o general weakness this week. Told she has a UTI & sent here.

## 2019-09-05 NOTE — DISCHARGE INSTRUCTIONS
Start your antibiotics tomorrow.  You have been given a dose today that will last 24 hours    Return for vomiting, dizziness, any concerns.

## 2019-09-10 NOTE — ED NOTES
"ED Positive Culture Follow-up/Notification Note:    Date: 9/10/19     Patient seen in the ED on 9/5/2019 for UTI and hyperglycemia.   1. Generalized weakness    2. Urinary tract infection without hematuria, site unspecified    3. Hyperglycemia     Given Rocephin 1 g IV in the ER.    Discharge Medication List as of 9/5/2019  2:14 PM      START taking these medications    Details   cefdinir (OMNICEF) 300 MG Cap Take 1 Cap by mouth 2 times a day for 6 days., Disp-12 Cap, R-0, Print Rx Paper             Allergies: Patient has no known allergies.     Vitals:    09/05/19 1201 09/05/19 1202 09/05/19 1400   BP:  150/71 143/77   Pulse:  71 68   Resp:  18 15   Temp:  36.1 °C (97 °F)    TempSrc:  Temporal    SpO2:  98% 98%   Weight: 72.6 kg (160 lb)     Height: 1.575 m (5' 2\")         Final cultures:   Results     Procedure Component Value Units Date/Time    URINE CULTURE(NEW) [[id number]]  (Abnormal)  (Susceptibility) Collected:  09/05/19 1145    Order Status:  Completed Specimen:  Urine Updated:  09/07/19 0809     Significant Indicator POS     Source UR     Site -     Culture Result Mixed skin jeanna 10-50,000 cfu/mL      Escherichia coli  >100,000 cfu/mL      Susceptibility     Escherichia coli (1)     Antibiotic Interpretation Microscan Method Status    Ceftriaxone Sensitive <=8 mcg/mL CASANDRA Final    Ceftazidime Sensitive <=1 mcg/mL CASANDRA Final    Cephalothin Intermediate 16 mcg/mL CASANDRA Final    Cefotaxime Sensitive <=2 mcg/mL CASANDRA Final    Ciprofloxacin Resistant >2 mcg/mL CASANDRA Final    Cefepime Sensitive <=8 mcg/mL CASANDRA Final    Cefuroxime Sensitive <=4 mcg/mL CASANDRA Final    Ampicillin Resistant >16 mcg/mL CASANDRA Final    Cefotetan Sensitive <=16 mcg/mL CASANDRA Final    Tobramycin Sensitive <=4 mcg/mL CASANDRA Final    Nitrofurantoin Sensitive <=32 mcg/mL CASANDRA Final    Gentamicin Sensitive <=4 mcg/mL CASANDRA Final    Levofloxacin Resistant >4 mcg/mL CASANDRA Final    Pip/Tazobactam Sensitive <=16 mcg/mL CASANDRA Final    Piperacillin Resistant >64 mcg/mL CASANDRA " Final    Trimeth/Sulfa Resistant >2/38 mcg/mL CASANDRA Final    Tigecycline Sensitive <=2 mcg/mL CASANDRA Final                   Blood Culture,Hold [[id number]] Collected:  09/05/19 1215    Order Status:  Completed Updated:  09/05/19 1311     Blood Culture Hold Collected    URINALYSIS,CULTURE IF INDICATED [[id number]]  (Abnormal) Collected:  09/05/19 1145    Order Status:  Completed Specimen:  Urine Updated:  09/05/19 1241     Color Yellow     Character Cloudy     Specific Gravity 1.017     Ph 7.5     Glucose >=1000 mg/dL      Ketones Negative mg/dL      Protein 300 mg/dL      Bilirubin Negative     Urobilinogen, Urine 1.0     Nitrite Positive     Leukocyte Esterase Small     Occult Blood Trace     Micro Urine Req Microscopic          Plan:   Appropriate antibiotic therapy prescribed. No changes required based upon culture result.  Sent letter to patient to notify of positive culture result and encourage compliance with prescribed antibiotics.     Ana Pardo

## 2019-09-16 ENCOUNTER — HOSPITAL ENCOUNTER (OUTPATIENT)
Dept: RADIOLOGY | Facility: MEDICAL CENTER | Age: 74
End: 2019-09-16
Attending: NURSE PRACTITIONER
Payer: MEDICARE

## 2019-09-16 DIAGNOSIS — R74.8 ACID PHOSPHATASE ELEVATED: ICD-10-CM

## 2019-09-16 PROCEDURE — 76700 US EXAM ABDOM COMPLETE: CPT

## 2019-10-16 DIAGNOSIS — Z01.812 PRE-PROCEDURAL LABORATORY EXAMINATION: ICD-10-CM

## 2019-10-16 DIAGNOSIS — Z01.810 PRE-OPERATIVE CARDIOVASCULAR EXAMINATION: ICD-10-CM

## 2019-10-16 LAB
ANION GAP SERPL CALC-SCNC: 9 MMOL/L (ref 0–11.9)
BUN SERPL-MCNC: 22 MG/DL (ref 8–22)
CALCIUM SERPL-MCNC: 10.2 MG/DL (ref 8.5–10.5)
CHLORIDE SERPL-SCNC: 101 MMOL/L (ref 96–112)
CO2 SERPL-SCNC: 26 MMOL/L (ref 20–33)
CREAT SERPL-MCNC: 1.93 MG/DL (ref 0.5–1.4)
EKG IMPRESSION: NORMAL
ERYTHROCYTE [DISTWIDTH] IN BLOOD BY AUTOMATED COUNT: 43.5 FL (ref 35.9–50)
GLUCOSE SERPL-MCNC: 368 MG/DL (ref 65–99)
HCT VFR BLD AUTO: 39.1 % (ref 37–47)
HGB BLD-MCNC: 13.4 G/DL (ref 12–16)
MCH RBC QN AUTO: 30.7 PG (ref 27–33)
MCHC RBC AUTO-ENTMCNC: 34.3 G/DL (ref 33.6–35)
MCV RBC AUTO: 89.7 FL (ref 81.4–97.8)
PLATELET # BLD AUTO: 215 K/UL (ref 164–446)
PMV BLD AUTO: 10.6 FL (ref 9–12.9)
POTASSIUM SERPL-SCNC: 4 MMOL/L (ref 3.6–5.5)
RBC # BLD AUTO: 4.36 M/UL (ref 4.2–5.4)
SODIUM SERPL-SCNC: 136 MMOL/L (ref 135–145)
WBC # BLD AUTO: 8.6 K/UL (ref 4.8–10.8)

## 2019-10-16 PROCEDURE — 85027 COMPLETE CBC AUTOMATED: CPT

## 2019-10-16 PROCEDURE — 80048 BASIC METABOLIC PNL TOTAL CA: CPT

## 2019-10-16 PROCEDURE — 93010 ELECTROCARDIOGRAM REPORT: CPT | Performed by: INTERNAL MEDICINE

## 2019-10-16 PROCEDURE — 93005 ELECTROCARDIOGRAM TRACING: CPT | Performed by: SURGERY

## 2019-10-16 PROCEDURE — 36415 COLL VENOUS BLD VENIPUNCTURE: CPT

## 2019-10-16 RX ORDER — POLYETHYLENE GLYCOL 3350 17 G/17G
17 POWDER, FOR SOLUTION ORAL DAILY
COMMUNITY
End: 2022-09-09

## 2019-10-16 RX ORDER — DOCUSATE SODIUM 100 MG/1
100 CAPSULE, LIQUID FILLED ORAL 2 TIMES DAILY PRN
COMMUNITY
End: 2022-09-09

## 2019-10-16 RX ORDER — INSULIN GLARGINE 100 [IU]/ML
35 INJECTION, SOLUTION SUBCUTANEOUS
COMMUNITY
End: 2022-09-09

## 2019-10-16 RX ORDER — BRIMONIDINE TARTRATE AND TIMOLOL MALEATE 2; 5 MG/ML; MG/ML
1 SOLUTION OPHTHALMIC 2 TIMES DAILY
COMMUNITY

## 2019-10-17 RX ORDER — LIDOCAINE HYDROCHLORIDE 10 MG/ML
INJECTION, SOLUTION EPIDURAL; INFILTRATION; INTRACAUDAL; PERINEURAL
Status: DISPENSED
Start: 2019-10-17 | End: 2019-10-17

## 2019-10-18 ENCOUNTER — ANESTHESIA EVENT (OUTPATIENT)
Dept: SURGERY | Facility: MEDICAL CENTER | Age: 74
End: 2019-10-18
Payer: MEDICARE

## 2019-10-18 ENCOUNTER — HOSPITAL ENCOUNTER (OUTPATIENT)
Facility: MEDICAL CENTER | Age: 74
End: 2019-10-18
Attending: SURGERY | Admitting: SURGERY
Payer: MEDICARE

## 2019-10-18 ENCOUNTER — ANESTHESIA (OUTPATIENT)
Dept: SURGERY | Facility: MEDICAL CENTER | Age: 74
End: 2019-10-18
Payer: MEDICARE

## 2019-10-18 VITALS
WEIGHT: 171.52 LBS | HEART RATE: 87 BPM | TEMPERATURE: 97.8 F | SYSTOLIC BLOOD PRESSURE: 162 MMHG | DIASTOLIC BLOOD PRESSURE: 75 MMHG | RESPIRATION RATE: 14 BRPM | OXYGEN SATURATION: 96 % | BODY MASS INDEX: 31.56 KG/M2 | HEIGHT: 62 IN

## 2019-10-18 LAB
GLUCOSE BLD-MCNC: 111 MG/DL (ref 65–99)
GLUCOSE BLD-MCNC: 80 MG/DL (ref 65–99)
PATHOLOGY CONSULT NOTE: NORMAL

## 2019-10-18 PROCEDURE — 160035 HCHG PACU - 1ST 60 MINS PHASE I: Performed by: SURGERY

## 2019-10-18 PROCEDURE — 700101 HCHG RX REV CODE 250: Performed by: SURGERY

## 2019-10-18 PROCEDURE — 160039 HCHG SURGERY MINUTES - EA ADDL 1 MIN LEVEL 3: Performed by: SURGERY

## 2019-10-18 PROCEDURE — 700102 HCHG RX REV CODE 250 W/ 637 OVERRIDE(OP): Performed by: ANESTHESIOLOGY

## 2019-10-18 PROCEDURE — A9270 NON-COVERED ITEM OR SERVICE: HCPCS | Performed by: ANESTHESIOLOGY

## 2019-10-18 PROCEDURE — 501399 HCHG SPECIMAN BAG, ENDO CATC: Performed by: SURGERY

## 2019-10-18 PROCEDURE — 700105 HCHG RX REV CODE 258: Performed by: SURGERY

## 2019-10-18 PROCEDURE — 160025 RECOVERY II MINUTES (STATS): Performed by: SURGERY

## 2019-10-18 PROCEDURE — 160046 HCHG PACU - 1ST 60 MINS PHASE II: Performed by: SURGERY

## 2019-10-18 PROCEDURE — 700111 HCHG RX REV CODE 636 W/ 250 OVERRIDE (IP): Performed by: ANESTHESIOLOGY

## 2019-10-18 PROCEDURE — 700101 HCHG RX REV CODE 250: Performed by: ANESTHESIOLOGY

## 2019-10-18 PROCEDURE — 160036 HCHG PACU - EA ADDL 30 MINS PHASE I: Performed by: SURGERY

## 2019-10-18 PROCEDURE — 500002 HCHG ADHESIVE, DERMABOND: Performed by: SURGERY

## 2019-10-18 PROCEDURE — 82962 GLUCOSE BLOOD TEST: CPT

## 2019-10-18 PROCEDURE — 501582 HCHG TROCAR, THRD BLADED: Performed by: SURGERY

## 2019-10-18 PROCEDURE — 160048 HCHG OR STATISTICAL LEVEL 1-5: Performed by: SURGERY

## 2019-10-18 PROCEDURE — 501838 HCHG SUTURE GENERAL: Performed by: SURGERY

## 2019-10-18 PROCEDURE — 160002 HCHG RECOVERY MINUTES (STAT): Performed by: SURGERY

## 2019-10-18 PROCEDURE — 502571 HCHG PACK, LAP CHOLE: Performed by: SURGERY

## 2019-10-18 PROCEDURE — 88304 TISSUE EXAM BY PATHOLOGIST: CPT

## 2019-10-18 PROCEDURE — 160028 HCHG SURGERY MINUTES - 1ST 30 MINS LEVEL 3: Performed by: SURGERY

## 2019-10-18 PROCEDURE — 700111 HCHG RX REV CODE 636 W/ 250 OVERRIDE (IP)

## 2019-10-18 PROCEDURE — 160009 HCHG ANES TIME/MIN: Performed by: SURGERY

## 2019-10-18 PROCEDURE — 500854 HCHG NEEDLE, INSUFFLATION FOR STEP: Performed by: SURGERY

## 2019-10-18 RX ORDER — HALOPERIDOL 5 MG/ML
1 INJECTION INTRAMUSCULAR
Status: DISCONTINUED | OUTPATIENT
Start: 2019-10-18 | End: 2019-10-18 | Stop reason: HOSPADM

## 2019-10-18 RX ORDER — ACETAMINOPHEN 500 MG
1000 TABLET ORAL ONCE
Status: COMPLETED | OUTPATIENT
Start: 2019-10-18 | End: 2019-10-18

## 2019-10-18 RX ORDER — OXYCODONE HCL 5 MG/5 ML
10 SOLUTION, ORAL ORAL
Status: COMPLETED | OUTPATIENT
Start: 2019-10-18 | End: 2019-10-18

## 2019-10-18 RX ORDER — OXYCODONE HCL 5 MG/5 ML
5 SOLUTION, ORAL ORAL
Status: COMPLETED | OUTPATIENT
Start: 2019-10-18 | End: 2019-10-18

## 2019-10-18 RX ORDER — MEPERIDINE HYDROCHLORIDE 25 MG/ML
12.5 INJECTION INTRAMUSCULAR; INTRAVENOUS; SUBCUTANEOUS
Status: DISCONTINUED | OUTPATIENT
Start: 2019-10-18 | End: 2019-10-18 | Stop reason: HOSPADM

## 2019-10-18 RX ORDER — HYDROMORPHONE HYDROCHLORIDE 1 MG/ML
0.1 INJECTION, SOLUTION INTRAMUSCULAR; INTRAVENOUS; SUBCUTANEOUS
Status: DISCONTINUED | OUTPATIENT
Start: 2019-10-18 | End: 2019-10-18 | Stop reason: HOSPADM

## 2019-10-18 RX ORDER — HYDROMORPHONE HYDROCHLORIDE 1 MG/ML
0.2 INJECTION, SOLUTION INTRAMUSCULAR; INTRAVENOUS; SUBCUTANEOUS
Status: DISCONTINUED | OUTPATIENT
Start: 2019-10-18 | End: 2019-10-18 | Stop reason: HOSPADM

## 2019-10-18 RX ORDER — ONDANSETRON 2 MG/ML
4 INJECTION INTRAMUSCULAR; INTRAVENOUS
Status: COMPLETED | OUTPATIENT
Start: 2019-10-18 | End: 2019-10-18

## 2019-10-18 RX ORDER — HYDRALAZINE HYDROCHLORIDE 20 MG/ML
INJECTION INTRAMUSCULAR; INTRAVENOUS
Status: COMPLETED
Start: 2019-10-18 | End: 2019-10-18

## 2019-10-18 RX ORDER — HYDRALAZINE HYDROCHLORIDE 20 MG/ML
5 INJECTION INTRAMUSCULAR; INTRAVENOUS
Status: DISCONTINUED | OUTPATIENT
Start: 2019-10-18 | End: 2019-10-18 | Stop reason: HOSPADM

## 2019-10-18 RX ORDER — HYDROMORPHONE HYDROCHLORIDE 1 MG/ML
0.4 INJECTION, SOLUTION INTRAMUSCULAR; INTRAVENOUS; SUBCUTANEOUS
Status: DISCONTINUED | OUTPATIENT
Start: 2019-10-18 | End: 2019-10-18 | Stop reason: HOSPADM

## 2019-10-18 RX ORDER — SODIUM CHLORIDE, SODIUM LACTATE, POTASSIUM CHLORIDE, CALCIUM CHLORIDE 600; 310; 30; 20 MG/100ML; MG/100ML; MG/100ML; MG/100ML
INJECTION, SOLUTION INTRAVENOUS CONTINUOUS
Status: DISCONTINUED | OUTPATIENT
Start: 2019-10-18 | End: 2019-10-18 | Stop reason: HOSPADM

## 2019-10-18 RX ORDER — SODIUM CHLORIDE, SODIUM LACTATE, POTASSIUM CHLORIDE, CALCIUM CHLORIDE 600; 310; 30; 20 MG/100ML; MG/100ML; MG/100ML; MG/100ML
INJECTION, SOLUTION INTRAVENOUS CONTINUOUS
Status: DISCONTINUED | OUTPATIENT
Start: 2019-10-18 | End: 2019-10-18

## 2019-10-18 RX ORDER — BUPIVACAINE HYDROCHLORIDE AND EPINEPHRINE 5; 5 MG/ML; UG/ML
INJECTION, SOLUTION EPIDURAL; INTRACAUDAL; PERINEURAL
Status: DISCONTINUED
Start: 2019-10-18 | End: 2019-10-18 | Stop reason: HOSPADM

## 2019-10-18 RX ORDER — LABETALOL HYDROCHLORIDE 5 MG/ML
5 INJECTION, SOLUTION INTRAVENOUS
Status: DISCONTINUED | OUTPATIENT
Start: 2019-10-18 | End: 2019-10-18 | Stop reason: HOSPADM

## 2019-10-18 RX ORDER — ROCURONIUM BROMIDE 10 MG/ML
INJECTION, SOLUTION INTRAVENOUS PRN
Status: DISCONTINUED | OUTPATIENT
Start: 2019-10-18 | End: 2019-10-18 | Stop reason: SURG

## 2019-10-18 RX ORDER — DEXAMETHASONE SODIUM PHOSPHATE 4 MG/ML
INJECTION, SOLUTION INTRA-ARTICULAR; INTRALESIONAL; INTRAMUSCULAR; INTRAVENOUS; SOFT TISSUE PRN
Status: DISCONTINUED | OUTPATIENT
Start: 2019-10-18 | End: 2019-10-18 | Stop reason: SURG

## 2019-10-18 RX ORDER — GABAPENTIN 300 MG/1
300 CAPSULE ORAL ONCE
Status: COMPLETED | OUTPATIENT
Start: 2019-10-18 | End: 2019-10-18

## 2019-10-18 RX ORDER — BUPIVACAINE HYDROCHLORIDE AND EPINEPHRINE 5; 5 MG/ML; UG/ML
INJECTION, SOLUTION EPIDURAL; INTRACAUDAL; PERINEURAL
Status: DISCONTINUED | OUTPATIENT
Start: 2019-10-18 | End: 2019-10-18 | Stop reason: HOSPADM

## 2019-10-18 RX ORDER — CEFAZOLIN SODIUM 1 G/3ML
INJECTION, POWDER, FOR SOLUTION INTRAMUSCULAR; INTRAVENOUS PRN
Status: DISCONTINUED | OUTPATIENT
Start: 2019-10-18 | End: 2019-10-18 | Stop reason: SURG

## 2019-10-18 RX ADMIN — HYDRALAZINE HYDROCHLORIDE 5 MG: 20 INJECTION INTRAMUSCULAR; INTRAVENOUS at 10:58

## 2019-10-18 RX ADMIN — FENTANYL CITRATE 50 MCG: 50 INJECTION INTRAMUSCULAR; INTRAVENOUS at 10:15

## 2019-10-18 RX ADMIN — HYDRALAZINE HYDROCHLORIDE 5 MG: 20 INJECTION INTRAMUSCULAR; INTRAVENOUS at 10:40

## 2019-10-18 RX ADMIN — HYDROMORPHONE HYDROCHLORIDE 0.4 MG: 1 INJECTION, SOLUTION INTRAMUSCULAR; INTRAVENOUS; SUBCUTANEOUS at 11:16

## 2019-10-18 RX ADMIN — PROPOFOL 200 MG: 10 INJECTION, EMULSION INTRAVENOUS at 09:05

## 2019-10-18 RX ADMIN — GABAPENTIN 300 MG: 300 CAPSULE ORAL at 08:18

## 2019-10-18 RX ADMIN — DEXAMETHASONE SODIUM PHOSPHATE 8 MG: 4 INJECTION, SOLUTION INTRA-ARTICULAR; INTRALESIONAL; INTRAMUSCULAR; INTRAVENOUS; SOFT TISSUE at 09:05

## 2019-10-18 RX ADMIN — OXYCODONE HYDROCHLORIDE 10 MG: 5 SOLUTION ORAL at 10:44

## 2019-10-18 RX ADMIN — ACETAMINOPHEN 1000 MG: 500 TABLET ORAL at 08:18

## 2019-10-18 RX ADMIN — FENTANYL CITRATE 50 MCG: 50 INJECTION INTRAMUSCULAR; INTRAVENOUS at 10:08

## 2019-10-18 RX ADMIN — CEFAZOLIN 2 G: 330 INJECTION, POWDER, FOR SOLUTION INTRAMUSCULAR; INTRAVENOUS at 09:06

## 2019-10-18 RX ADMIN — SODIUM CHLORIDE, POTASSIUM CHLORIDE, SODIUM LACTATE AND CALCIUM CHLORIDE: 600; 310; 30; 20 INJECTION, SOLUTION INTRAVENOUS at 08:23

## 2019-10-18 RX ADMIN — ROCURONIUM BROMIDE 50 MG: 10 INJECTION, SOLUTION INTRAVENOUS at 09:05

## 2019-10-18 RX ADMIN — ONDANSETRON 4 MG: 2 INJECTION INTRAMUSCULAR; INTRAVENOUS at 16:22

## 2019-10-18 ASSESSMENT — PAIN SCALES - GENERAL: PAIN_LEVEL: 5

## 2019-10-18 NOTE — PROGRESS NOTES
Laparoscopic Cholecystectomy D/C instructions:    1. DIET: Upon discharge from the hospital you may resume your normal preoperative diet. Depending on how you are feeling and whether you have nausea or not, you may wish to stay with a bland diet for the first few days. However, you can advance this as quickly as you feel ready.    2. ACTIVITIES: After discharge from the hospital, you may resume full routine activities. However, there should be no heavy lifting (greater than 15 pounds) and no strenuous activities until after your follow-up visit. Otherwise, routine activities of daily living are acceptable.    3. DRIVING: You may drive whenever you are off pain medications and are able to perform the activities needed to drive, i.e. turning, bending, twisting, etc.    4. BATHING: You may get the wound wet at any time after leaving the hospital. You may shower, but do not submerge in a bath for at least a week.     5. BOWEL FUNCTION: A few patients, after this operation, will develop either frequent or loose stools after meals. This usually corrects itself after a few days, to a few weeks. If this occurs, do not worry; it is not unusual and will resolve. Much more common than loose stools, is constipation. The combination of pain medication and decreased activity level can cause constipation in otherwise normal patients. If you feel this is occurring, take a laxative (Milk of Magnesia, Ex-Lax, Senokot, etc.) until the problem has resolved.    6. PAIN MEDICATION: You will be given a prescription for pain medication at discharge. Please take these as directed. It is important to remember not to take medications on an empty stomach as this may cause nausea.     It is also helpful to take other non-narcotic over the counter medications to help with pain control and to decrease the need for narcotic medications. I recommend ibuprofen, taken every 8 hours, dosing as directed on the medication bottle.    It is useful to  slowly decrease the number of narcotic pain medications you take starting the first day after surgery, as you are able. If you need a refill, Dr. Pierson's office will provide you with one refill at your post-operative visit. After this, no more narcotic pain medications will be given.     7.CALL IF YOU HAVE: (1) Fevers to more than 1010 F, (2) Unusual chest or leg pain, (3) Drainage or fluid from incision that may be foul smelling, increased tenderness or soreness at the wound or the wound edges are no longer together, redness or swelling at the incision site. Please do not hesitate to call with any other questions.     8. APPOINTMENT: Contact our office at [phone] for a follow-up appointment in 1 to 2 weeks following your procedure.    If you have any additional questions, please do not hesitate to call the office and speak to either myself or the physician on call.    Office address:  [address] [place] [address] [id number]  [place] [address] [address]    Kathleen Pierson M.D.  [facility]  [phone]

## 2019-10-18 NOTE — DISCHARGE INSTRUCTIONS
ACTIVITY: Rest and take it easy for the first 24 hours.  A responsible adult is recommended to remain with you during that time.  It is normal to feel sleepy.  We encourage you to not do anything that requires balance, judgment or coordination.    MILD FLU-LIKE SYMPTOMS ARE NORMAL. YOU MAY EXPERIENCE GENERALIZED MUSCLE ACHES, THROAT IRRITATION, HEADACHE AND/OR SOME NAUSEA.    FOR 24 HOURS DO NOT:  Drive, operate machinery or run household appliances.  Drink beer or alcoholic beverages.   Make important decisions or sign legal documents.    SPECIAL INSTRUCTIONS: *Laparoscopic Cholecystectomy D/C instructions:     1. DIET: Upon discharge from the hospital you may resume your normal preoperative diet. Depending on how you are feeling and whether you have nausea or not, you may wish to stay with a bland diet for the first few days. However, you can advance this as quickly as you feel ready.     2. ACTIVITIES: After discharge from the hospital, you may resume full routine activities. However, there should be no heavy lifting (greater than 15 pounds) and no strenuous activities until after your follow-up visit. Otherwise, routine activities of daily living are acceptable.     3. DRIVING: You may drive whenever you are off pain medications and are able to perform the activities needed to drive, i.e. turning, bending, twisting, etc.     4. BATHING: You may get the wound wet at any time after leaving the hospital. You may shower, but do not submerge in a bath for at least a week.      5. BOWEL FUNCTION: A few patients, after this operation, will develop either frequent or loose stools after meals. This usually corrects itself after a few days, to a few weeks. If this occurs, do not worry; it is not unusual and will resolve. Much more common than loose stools, is constipation. The combination of pain medication and decreased activity level can cause constipation in otherwise normal patients. If you feel this is occurring,  take a laxative (Milk of Magnesia, Ex-Lax, Senokot, etc.) until the problem has resolved.     6. PAIN MEDICATION: You will be given a prescription for pain medication at discharge. Please take these as directed. It is important to remember not to take medications on an empty stomach as this may cause nausea.      It is also helpful to take other non-narcotic over the counter medications to help with pain control and to decrease the need for narcotic medications. I recommend ibuprofen, taken every 8 hours, dosing as directed on the medication bottle.     It is useful to slowly decrease the number of narcotic pain medications you take starting the first day after surgery, as you are able. If you need a refill, Dr. Pierson's office will provide you with one refill at your post-operative visit. After this, no more narcotic pain medications will be given.      7.CALL IF YOU HAVE: (1) Fevers to more than 1010 F, (2) Unusual chest or leg pain, (3) Drainage or fluid from incision that may be foul smelling, increased tenderness or soreness at the wound or the wound edges are no longer together, redness or swelling at the incision site. Please do not hesitate to call with any other questions.      8. APPOINTMENT: Contact our office at [phone] for a follow-up appointment in 1 to 2 weeks following your procedure.     If you have any additional questions, please do not hesitate to call the office and speak to either myself or the physician on call.     Office address:  [address] [place] [address] #[facility] [place] [address]     Kathleen Pierson M.D.  [facility]  [phone]**    You should CALL YOUR PHYSICIAN if you develop:  Fever greater than 101 degrees F.  Pain not relieved by medication, or persistent nausea or vomiting.  Excessive bleeding (blood soaking through dressing) or unexpected drainage from the wound.  Extreme redness or swelling around the incision site, drainage of pus or foul smelling drainage.  Inability to  urinate or empty your bladder within 8 hours.  Problems with breathing or chest pain.    You should call 911 if you develop problems with breathing or chest pain.  If you are unable to contact your doctor or surgical center, you should go to the nearest emergency room or urgent care center.    Physician's telephone #: *Dr. Pierson [phone]**    If any questions arise, call your doctor.  If your doctor is not available, please feel free to call the Surgical Center at [phone].  The Center is open Monday through Friday from 7AM to 7PM.  You can also call the HEALTH HOTLINE open 24 hours/day, 7 days/week and speak to a nurse at [phone], or toll free at [phone].    A registered nurse may call you a few days after your surgery to see how you are doing after your procedure.    MEDICATIONS: Resume taking daily medication.  Take prescribed pain medication with food.  If no medication is prescribed, you may take non-aspirin pain medication if needed.  PAIN MEDICATION CAN BE VERY CONSTIPATING.  Take a stool softener or laxative such as senokot, pericolace, or milk of magnesia if needed.    Prescription given for pain medication.  Last pain medication given at *10:45 am; next dose due at any time**.    If your physician has prescribed pain medication that includes Acetaminophen (Tylenol), do not take additional Acetaminophen (Tylenol) while taking the prescribed medication.    Depression / Suicide Risk    As you are discharged from this [facility] facility, it is important to learn how to keep safe from harming yourself.    Recognize the warning signs:  · Abrupt changes in personality, positive or negative- including increase in energy   · Giving away possessions  · Change in eating patterns- significant weight changes-  positive or negative  · Change in sleeping patterns- unable to sleep or sleeping all the time   · Unwillingness or inability to communicate  · Depression  · Unusual sadness, discouragement  and loneliness  · Talk of wanting to die  · Neglect of personal appearance   · Rebelliousness- reckless behavior  · Withdrawal from people/activities they love  · Confusion- inability to concentrate     If you or a loved one observes any of these behaviors or has concerns about self-harm, here's what you can do:  · Talk about it- your feelings and reasons for harming yourself  · Remove any means that you might use to hurt yourself (examples: pills, rope, extension cords, firearm)  · Get professional help from the community (Mental Health, Substance Abuse, psychological counseling)  · Do not be alone:Call your Safe Contact- someone whom you trust who will be there for you.  · Call your local CRISIS HOTLINE 605-7413 or 647-559-2767  · Call your local Children's Mobile Crisis Response Team [place] (677) 209-7599 or www.Wayger  · Call the toll free National Suicide Prevention Hotlines   · National Suicide Prevention Lifeline 157-316-YGNP (2128)  · National Hope Line Network 800-SUICIDE (597-5440)

## 2019-10-18 NOTE — ANESTHESIA PROCEDURE NOTES
Airway  Date/Time: 10/18/2019 9:06 AM  Performed by: Tobey Gansert, M.D.  Authorized by: Tobey Gansert, M.D.     Location:  OR  Urgency:  Elective  Indications for Airway Management:  Anesthesia  Spontaneous Ventilation: absent    Sedation Level:  Deep  Preoxygenated: Yes    Patient Position:  Sniffing  Final Airway Type:  Endotracheal airway  Final Endotracheal Airway:  ETT  Cuffed: Yes    Technique Used for Successful ETT Placement:  Direct laryngoscopy  Insertion Site:  Oral  Blade Type:  Jose  Laryngoscope Blade/Videolaryngoscope Blade Size:  3  ETT Size (mm):  7.0  Measured from:  Teeth  ETT to Teeth (cm):  22  Placement Verified by: auscultation and capnometry    Cormack-Lehane Classification:  Grade I - full view of glottis  Number of Attempts at Approach:  1

## 2019-10-18 NOTE — OR NURSING
1630 Report from LATOYA John.    1650 Discharge orders received. Meets discharge criteria at this time. Tolerable pain. Mild nausea, reports improvement after medication and requesting to go home. Tolerating PO. On RA. All lines and monitors discontinued. Reviewed discharge paperwork with pt and daughter. Discussed diet, activity, medications, follow up care and worsening symptoms. No questions at this time. Pt to be discharged to home via private vehicle. Pt escorted out of department in wheelchair with CNA, daughter, and all belongings.

## 2019-10-18 NOTE — PROGRESS NOTES
1125 - report received and care assumed. BP within parameters, pt moaning - see MAR  1150 - seems more comfortable - no moaning noted, BP remains in the parameters set by anesthesia. Report to Dora

## 2019-10-18 NOTE — ANESTHESIA QCDR
2019 [facility] Clinical Data Registry (for Quality Improvement)     Postoperative nausea/vomiting risk protocol (Adult = 18 yrs and Pediatric 3-17 yrs)- (430 and 463)  General inhalation anesthetic (NOT TIVA) with PONV risk factors: Yes  Provision of anti-emetic therapy with at least 2 different classes of agents: Yes   Patient DID NOT receive anti-emetic therapy and reason is documented in Medical Record:  N/A    Multimodal Pain Management- (AQI59)  Patient undergoing Elective Surgery (i.e. Outpatient, or ASC, or Prescheduled Surgery prior to Hospital Admission): Yes  Use of Multimodal Pain Management, two or more drugs and/or interventions, NOT including systemic opioids: Yes   Exception: Documented allergy to multiple classes of analgesics:  N/A    PACU assessment of acute postoperative pain prior to Anesthesia Care End- Applies to Patients Age = 18- (ABG7)  Initial PACU pain score is which of the following: < 7/10  Patient unable to report pain score: N/A    Post-anesthetic transfer of care checklist/protocol to PACU/ICU- (426 and 427)  Upon conclusion of case, patient transferred to which of the following locations: PACU/Non-ICU  Use of transfer checklist/protocol: Yes  Exclusion: Service Performed in Patient Hospital Room (and thus did not require transfer): N/A    PACU Reintubation- (AQI31)  General anesthesia requiring endotracheal intubation (ETT) along with subsequent extubation in OR or PACU: Yes  Required reintubation in the PACU: No   Extubation was a planned trial documented in the medical record prior to removal of the original airway device:  N/A    Unplanned admission to ICU related to anesthesia service up through end of PACU care- (MD51)  Unplanned admission to ICU (not initially anticipated at anesthesia start time): No

## 2019-10-18 NOTE — ANESTHESIA TIME REPORT
Anesthesia Start and Stop Event Times     Date Time Event    10/18/2019 0847 Ready for Procedure     0903 Anesthesia Start     0947 Anesthesia Stop        Responsible Staff  10/18/19    Name Role Begin End    Tobey Gansert, M.D. Anesth 0903 0947        Preop Diagnosis (Free Text):  Pre-op Diagnosis     CHRONIC CHOLECYSTITIS        Preop Diagnosis (Codes):    Post op Diagnosis  Chronic cholecystitis      Premium Reason  Non-Premium    Comments:

## 2019-10-18 NOTE — OP REPORT
Operative Report    Date: 10/18/2019    Surgeon: Kathleen Pierson M.D.    Assistant: KIRAN Bean    Anesthesiologist: Gansert M.D.    Preoperative Diagnosis: K80.10 Calculus of gallbladder with chronic cholecystitis without obstruction    Postoperative Diagnosis: same    Procedure Performed: 30240 Laparoscopy, surgical; cholecystectomy    Indications: This is a 74 y.o. female who presented with abdominal pain. History, physical and diagnostic studies are consistent with chronic cholecystitis.    An extensive procedures, alternative, risks and questions conference was held with the patient and her family, in regard to the surgical treatment of cholecystitis. The patient was counseled regarding the benefits of the operation, namely, removal of gallbladder and alleviation of infection and symptoms. The patient was made aware of the alternatives, including operative and non-operative management. The risks of bleeding, infection, damage to surrounding structures, need for reoperation, need for open operation, bile duct injury, stroke, MI, and death were discussed with the patient. The patient was given a chance to ask questions, and all her questions were answered. Discussion was undertaken with Layman's terms. The patient and family demonstrated adequate understanding, seemed pleased with the plan, and wish to proceed. Consent was given in clear state of mind.  Consent was signed.     Findings: chronically inflamed dilated gallbladder with stones.    Procedure in detail: The patient was brought to the operating room and was placed in the supine position where general endotracheal anesthesia was induced. SCDs were in place and functioning. Preoperative antibiotics of ancef were given before incision time. The patient's abdomen was prepped and draped in the usual sterile fashion.    After infiltration of local anesthetic, a skin incision was made to accommodate a 5 mm port supra-umbilically. We then used the Veress  needle to access the peritoneum, and after saline drop test, we insufflated to 15 mmHg. We then placed the 5mm 30 degree camera and inspected the abdomen for evidence of trauma secondary to Veress needle or port placement, and found none.    Utilizing the 30-degree laparoscope with the patient in the reverse Trendelenburg position, and after infiltration of local anesthetic, an additional 11-mm port was inserted into the epigastrium just to the right of the midline. Then two 5-mm ports were inserted in the midclavicular and anterior axillary lines, in the right upper quadrant, all under direct visualization.    The gallbladder was identified, it appeared dilated and chronically inflamed. The gallbladder was retracted cephalad and caudally using gallbladder graspers. Using the [place], we were able to peel the overlying peritoneum off the cystic duct, and circumferentially dissect it. We used electrocautery to futher remove the peritoneum off the gallbladder. We then were able to further dissect Calot's triangle until we identified the cystic artery, which was circumferrentially dissected. A critical view of safety was obtained.    We then doubly clipped the cystic duct distally and divided it. We then doubly clipped the cystic artery and divided it.Then, using electrocautery, we removed the gallbladder from the liver bed. After ensuring gallbladder bed hemostasis with cautery, we removed the gallbladder and placed it in an endocatch bag, and removed it from the epigastric port site.    The right upper quadrant was irrigated copiously with normal saline solution. We inspected the cystic duct and artery stumps, and found them to be intact. The liver bed was hemostatic.    The trocars were removed under direct visualization.     The skin of all incisions was closed with subcuticular suture.    All sponge, needle, and instrument counts were reported as correct at the end of the procedure. The patient tolerated the  procedure well and left the operating room for the recovery room in stable and satisfactory condition.    Estimated Blood Loss: minimal     Specimens: gallbladder for permanent pathology    Complications: none apparent     Drains: none     Disposition: stable, extubated, to PACU    Kathleen Pierson M.D.  [facility]  [phone]

## 2019-10-18 NOTE — ANESTHESIA POSTPROCEDURE EVALUATION
Patient: Pam Hernandez    Procedure Summary     Date:  10/18/19 Room / Location:  [facility] ROOM 24 / SURGERY SAME DAY [facility]    Anesthesia Start:  0903 Anesthesia Stop:  0947    Procedure:  CHOLECYSTECTOMY, LAPAROSCOPIC (N/A Abdomen) Diagnosis:  (CHRONIC CHOLECYSTITIS)    Surgeon:  Kathleen Pierson M.D. Responsible Provider:  Tobey Gansert, M.D.    Anesthesia Type:  general ASA Status:  3          Final Anesthesia Type: general  Last vitals  BP   Blood Pressure : 160/76    Temp   36.6 °C (97.8 °F)    Pulse   Pulse: 74   Resp   13    SpO2   96 %      Anesthesia Post Evaluation    Patient location during evaluation: PACU  Patient participation: complete - patient participated  Level of consciousness: awake and alert  Pain score: 5    Airway patency: patent  Anesthetic complications: no  Cardiovascular status: hemodynamically stable  Respiratory status: acceptable  Hydration status: euvolemic    PONV: none           Nurse Pain Score: 5 (NPRS)

## 2019-10-18 NOTE — OR NURSING
1150 Received report back from Michell KLEIN resumed care of pt at this time.     1330 Attempted to titrate pt to Ra, desaturates to 79%, O2 replaced at 2L per NC. Pt back to sleep. Daughter at bedside.     1430 Attempted to titrate pt to RA, desaturates to mid 80's. O2 replaced at 1L per NC.    1530 Pt awake, sitting in recliner, eating sandwich. Titrated to RA.     1600 Pt and Daugher given instructions for discharge.    1620 Pt became nauseated, medicated with zofran.      1630 Report to Pam KLEIN.

## 2019-10-18 NOTE — OR NURSING
0948 Received pt from OR, received report from OR RN and Dr. Gansert. Pt sleeping, VS WNL, BG checked 111 mg/dL. Pt has lap stabs to abdomen, bruising in RUQ, will cont to monitor. All incisions closed with dermabond, CDI.     1008 Pt medicated with fent for pain 6/10 to abdomen.     1015 Pt medicated with fent IV for pain 6/10 to abdomen.     1125 Report to Michell KLEIN.

## 2019-10-18 NOTE — ANESTHESIA PREPROCEDURE EVALUATION
Relevant Problems   CARDIAC   (+) Essential hypertension         (+) Acute on chronic kidney failure (HCC)      ENDO   (+) Hypothyroidism   (+) Type 2 diabetes mellitus with hypoglycemia, with long-term current use of insulin (HCC)       Physical Exam    Airway   Mallampati: II  TM distance: >3 FB  Neck ROM: full       Cardiovascular - normal exam  Rhythm: regular  Rate: normal  (-) murmur     Dental - normal exam         Pulmonary - normal exam  Breath sounds clear to auscultation     Abdominal    Neurological - normal exam                 Anesthesia Plan    ASA 3   ASA physical status 3 criteria: hypertension - poorly controlled and diabetes - poorly controlled    Plan - general       Airway plan will be ETT        Induction: intravenous    Postoperative Plan: Postoperative administration of opioids is intended.    Pertinent diagnostic labs and testing reviewed    Informed Consent:    Anesthetic plan and risks discussed with patient.    Use of blood products discussed with: patient whom consented to blood products.

## 2021-01-08 DIAGNOSIS — Z23 NEED FOR VACCINATION: ICD-10-CM

## 2021-05-29 NOTE — CARE PLAN
Problem: Safety  Goal: Will remain free from falls  Outcome: PROGRESSING AS EXPECTED  Pt educated to use call light before getting out of bed. Call light in reach. Bed locked in lowest position with 2 upper bed rails up. Pt encouraged to sit at edge of bed before standing up. No c/o dizziness. Assistance of one given to help pt to the bathroom and back to bed. Room floor is free from clutter. Treaded socks on pt. Bed alarm on.     Problem: Knowledge Deficit  Goal: Knowledge of disease process/condition, treatment plan, diagnostic tests, and medications will improve    Intervention: Explain information regarding disease process/condition, treatment plan, diagnostic tests, and medications and document in education  Pt educated on plan of care, medications, pain management, and disease processes. Pt verbalized understanding and was encouraged to ask questions. All questions answered.            non-distended

## 2021-12-01 ENCOUNTER — TELEPHONE (OUTPATIENT)
Dept: HEALTH INFORMATION MANAGEMENT | Facility: OTHER | Age: 76
End: 2021-12-01

## 2022-04-26 ENCOUNTER — HOSPITAL ENCOUNTER (OUTPATIENT)
Dept: RADIOLOGY | Facility: MEDICAL CENTER | Age: 77
End: 2022-04-26
Attending: FAMILY MEDICINE
Payer: MEDICARE

## 2022-04-26 DIAGNOSIS — M25.511 RIGHT SHOULDER PAIN, UNSPECIFIED CHRONICITY: ICD-10-CM

## 2022-04-26 PROCEDURE — 73221 MRI JOINT UPR EXTREM W/O DYE: CPT | Mod: RT

## 2022-05-18 ENCOUNTER — HOSPITAL ENCOUNTER (EMERGENCY)
Facility: MEDICAL CENTER | Age: 77
End: 2022-05-18
Payer: MEDICARE

## 2022-05-18 VITALS
SYSTOLIC BLOOD PRESSURE: 136 MMHG | TEMPERATURE: 96.4 F | OXYGEN SATURATION: 98 % | RESPIRATION RATE: 18 BRPM | HEART RATE: 60 BPM | DIASTOLIC BLOOD PRESSURE: 59 MMHG

## 2022-05-18 PROCEDURE — 302449 STATCHG TRIAGE ONLY (STATISTIC)

## 2022-05-25 ENCOUNTER — PHYSICAL THERAPY (OUTPATIENT)
Dept: PHYSICAL THERAPY | Facility: REHABILITATION | Age: 77
End: 2022-05-25
Attending: FAMILY MEDICINE
Payer: MEDICARE

## 2022-05-25 DIAGNOSIS — M25.511 RIGHT SHOULDER PAIN, UNSPECIFIED CHRONICITY: ICD-10-CM

## 2022-05-25 PROCEDURE — 999999 HB NO CHARGE

## 2022-05-25 PROCEDURE — 97161 PT EVAL LOW COMPLEX 20 MIN: CPT

## 2022-05-25 NOTE — OP THERAPY EVALUATION
Per chart review, patient went to ER on 5/18/22, and left without being seen.  Reports sugars were in 400s, but it was too loud in there and she thought that he sugars would go down.  Patient reports that today sugars are good, but over the past week her sugars have been variable, as low as in the 60s, and as high as the mid 300s.      At this time, as this is new issue, as patient reports last change in medication was 6+ months ago, holding physical therapy evaluation until patient is medically cleared and sugars are medically controlled, secondary to risk of exercise with varying sugar levels.      Patient educated, and will be evaluated in 1 week, pending written clearance to return to PT.

## 2022-06-02 ENCOUNTER — TELEPHONE (OUTPATIENT)
Dept: HEALTH INFORMATION MANAGEMENT | Facility: OTHER | Age: 77
End: 2022-06-02
Payer: MEDICARE

## 2022-06-02 ENCOUNTER — APPOINTMENT (OUTPATIENT)
Dept: PHYSICAL THERAPY | Facility: REHABILITATION | Age: 77
End: 2022-06-02
Payer: MEDICARE

## 2022-06-02 NOTE — TELEPHONE ENCOUNTER
Called member to update PCP and scedule COMPREHENSIVE HEALTH ASSESSMENT.   LVM requestng a call back.  Please transfer to Member to Outreach Team at [phone] when patient returns call.

## 2022-06-06 ENCOUNTER — HOSPITAL ENCOUNTER (OUTPATIENT)
Facility: MEDICAL CENTER | Age: 77
End: 2022-06-06
Attending: NURSE PRACTITIONER
Payer: MEDICARE

## 2022-06-06 ENCOUNTER — OFFICE VISIT (OUTPATIENT)
Dept: URGENT CARE | Facility: CLINIC | Age: 77
End: 2022-06-06
Payer: MEDICARE

## 2022-06-06 VITALS
WEIGHT: 162 LBS | DIASTOLIC BLOOD PRESSURE: 58 MMHG | TEMPERATURE: 97.2 F | RESPIRATION RATE: 14 BRPM | HEIGHT: 62 IN | OXYGEN SATURATION: 100 % | SYSTOLIC BLOOD PRESSURE: 142 MMHG | BODY MASS INDEX: 29.81 KG/M2 | HEART RATE: 56 BPM

## 2022-06-06 DIAGNOSIS — R53.81 MALAISE: ICD-10-CM

## 2022-06-06 DIAGNOSIS — R53.83 FATIGUE, UNSPECIFIED TYPE: ICD-10-CM

## 2022-06-06 DIAGNOSIS — R42 DIZZINESS: ICD-10-CM

## 2022-06-06 LAB
ALBUMIN SERPL BCP-MCNC: 3.9 G/DL (ref 3.2–4.9)
ALBUMIN/GLOB SERPL: 1.1 G/DL
ALP SERPL-CCNC: 110 U/L (ref 30–99)
ALT SERPL-CCNC: 20 U/L (ref 2–50)
ANION GAP SERPL CALC-SCNC: 11 MMOL/L (ref 7–16)
APPEARANCE UR: CLEAR
AST SERPL-CCNC: 28 U/L (ref 12–45)
BASOPHILS # BLD AUTO: 1.1 % (ref 0–1.8)
BASOPHILS # BLD: 0.05 K/UL (ref 0–0.12)
BILIRUB SERPL-MCNC: 0.4 MG/DL (ref 0.1–1.5)
BILIRUB UR STRIP-MCNC: NEGATIVE MG/DL
BUN SERPL-MCNC: 33 MG/DL (ref 8–22)
CALCIUM SERPL-MCNC: 9.2 MG/DL (ref 8.5–10.5)
CHLORIDE SERPL-SCNC: 102 MMOL/L (ref 96–112)
CO2 SERPL-SCNC: 23 MMOL/L (ref 20–33)
COLOR UR AUTO: YELLOW
CREAT SERPL-MCNC: 2.08 MG/DL (ref 0.5–1.4)
EOSINOPHIL # BLD AUTO: 0.1 K/UL (ref 0–0.51)
EOSINOPHIL NFR BLD: 2.2 % (ref 0–6.9)
ERYTHROCYTE [DISTWIDTH] IN BLOOD BY AUTOMATED COUNT: 45.5 FL (ref 35.9–50)
GFR SERPLBLD CREATININE-BSD FMLA CKD-EPI: 24 ML/MIN/1.73 M 2
GLOBULIN SER CALC-MCNC: 3.4 G/DL (ref 1.9–3.5)
GLUCOSE SERPL-MCNC: 185 MG/DL (ref 65–99)
GLUCOSE UR STRIP.AUTO-MCNC: 500 MG/DL
HCT VFR BLD AUTO: 43.8 % (ref 37–47)
HGB BLD-MCNC: 14.6 G/DL (ref 12–16)
IMM GRANULOCYTES # BLD AUTO: 0.01 K/UL (ref 0–0.11)
IMM GRANULOCYTES NFR BLD AUTO: 0.2 % (ref 0–0.9)
KETONES UR STRIP.AUTO-MCNC: NEGATIVE MG/DL
LEUKOCYTE ESTERASE UR QL STRIP.AUTO: NORMAL
LYMPHOCYTES # BLD AUTO: 1.29 K/UL (ref 1–4.8)
LYMPHOCYTES NFR BLD: 28.6 % (ref 22–41)
MCH RBC QN AUTO: 30.5 PG (ref 27–33)
MCHC RBC AUTO-ENTMCNC: 33.3 G/DL (ref 33.6–35)
MCV RBC AUTO: 91.6 FL (ref 81.4–97.8)
MONOCYTES # BLD AUTO: 0.3 K/UL (ref 0–0.85)
MONOCYTES NFR BLD AUTO: 6.7 % (ref 0–13.4)
NEUTROPHILS # BLD AUTO: 2.76 K/UL (ref 2–7.15)
NEUTROPHILS NFR BLD: 61.2 % (ref 44–72)
NITRITE UR QL STRIP.AUTO: NEGATIVE
NRBC # BLD AUTO: 0 K/UL
NRBC BLD-RTO: 0 /100 WBC
PH UR STRIP.AUTO: 7 [PH] (ref 5–8)
PLATELET # BLD AUTO: 176 K/UL (ref 164–446)
PMV BLD AUTO: 11.1 FL (ref 9–12.9)
POTASSIUM SERPL-SCNC: 4.7 MMOL/L (ref 3.6–5.5)
PROT SERPL-MCNC: 7.3 G/DL (ref 6–8.2)
PROT UR QL STRIP: NEGATIVE MG/DL
RBC # BLD AUTO: 4.78 M/UL (ref 4.2–5.4)
RBC UR QL AUTO: NEGATIVE
SODIUM SERPL-SCNC: 136 MMOL/L (ref 135–145)
SP GR UR STRIP.AUTO: 1.01
UROBILINOGEN UR STRIP-MCNC: 0.2 MG/DL
WBC # BLD AUTO: 4.5 K/UL (ref 4.8–10.8)

## 2022-06-06 PROCEDURE — 0240U HCHG SARS-COV-2 COVID-19 NFCT DS RESP RNA 3 TRGT MIC: CPT

## 2022-06-06 PROCEDURE — 99203 OFFICE O/P NEW LOW 30 MIN: CPT | Mod: CS | Performed by: NURSE PRACTITIONER

## 2022-06-06 PROCEDURE — 85025 COMPLETE CBC W/AUTO DIFF WBC: CPT

## 2022-06-06 PROCEDURE — 81002 URINALYSIS NONAUTO W/O SCOPE: CPT | Performed by: NURSE PRACTITIONER

## 2022-06-06 PROCEDURE — 80053 COMPREHEN METABOLIC PANEL: CPT

## 2022-06-06 ASSESSMENT — ENCOUNTER SYMPTOMS
NAUSEA: 0
HEADACHES: 0
DIARRHEA: 0
VOMITING: 0
MYALGIAS: 1
ABDOMINAL PAIN: 0
CHILLS: 0
FEVER: 0
SORE THROAT: 0
COUGH: 0

## 2022-06-06 NOTE — PROGRESS NOTES
Subjective:     Pam Hernandez is a 76 y.o. female who presents for Extremity Weakness (X 3 days with muscle aches and off balance. )      HPI  Pt presents for evaluation of a new problem. Pam is a pleasant 76 year old with complaints of weakness, body aches and dizziness for the past 3 days. She notes she has been recently pulling weeds and originally contributed this to her symptoms. She notes severe fatigue, intermittent headache. She denies abdominal pain, diarrhea, N/V,  runny nose, cough or sore throat. No known ill contacts.  She states that she was hospitalized a few months ago with similar symptoms and was found to have a low potassium level.  She is concerned that her potassium level is low at this time.    Review of Systems   Constitutional: Positive for malaise/fatigue. Negative for chills and fever.   HENT: Negative for congestion and sore throat.    Respiratory: Negative for cough.    Gastrointestinal: Negative for abdominal pain, diarrhea, nausea and vomiting.   Musculoskeletal: Positive for myalgias.   Neurological: Negative for headaches.       PMH:   Past Medical History:   Diagnosis Date   • Acid reflux    • Acute nasopharyngitis [id number]   • Anemia    • Anesthesia     reports wants sleep, hard to wake up   • Arthritis     fingers/hands   • At risk for falling    • Cataract     removed Dr.Stanko benjamín   • Dental disorder     partial lower, plate upper   • Diabetes     oral meds and insulin dependent   • Glaucoma     bilat   • Hypertension    • Hypothyroid 12/10/2018    on no meds at this time   • Neuropathy (HCC)    • Pneumonia     2015   • Psychiatric problem     depression   • Snoring     no sleep study   • Urinary frequency    • Urinary incontinence     wears a pad     ALLERGIES:   Allergies   Allergen Reactions   • Novocain Anaphylaxis     SURGHX:   Past Surgical History:   Procedure Laterality Date   • MADELINE BY LAPAROSCOPY N/A 10/18/2019    Procedure: CHOLECYSTECTOMY, LAPAROSCOPIC;   "Surgeon: Kathleen Pierson M.D.;  Location: SURGERY SAME DAY [facility];  Service: General   • VITRECTOMY POSTERIOR Right 12/11/2018    Procedure: VITRECTOMY POSTERIOR-  LASER;  Surgeon: Katina Bolaños M.D.;  Location: SURGERY SAME DAY [facility];  Service: Ophthalmology   • EYE SURGERY Left 2018    for glaucoma   • CATARACT EXTRACTION WITH IOL Bilateral    • US-NEEDLE CORE BX-BREAST PANEL       SOCHX:   Social History     Socioeconomic History   • Marital status:    Tobacco Use   • Smoking status: Never Smoker   • Smokeless tobacco: Never Used   Vaping Use   • Vaping Use: Never used   Substance and Sexual Activity   • Alcohol use: No   • Drug use: No     FH: No family history on file.      Objective:   BP (!) 142/58   Pulse (!) 56   Temp 36.2 °C (97.2 °F) (Temporal)   Resp 14   Ht 1.575 m (5' 2\")   Wt 73.5 kg (162 lb)   LMP  (LMP Unknown)   SpO2 100%   BMI 29.63 kg/m²     Physical Exam  Vitals and nursing note reviewed.   Constitutional:       General: She is not in acute distress.     Appearance: Normal appearance. She is normal weight. She is not ill-appearing.   HENT:      Head: Normocephalic and atraumatic.      Right Ear: Tympanic membrane, ear canal and external ear normal. There is no impacted cerumen.      Left Ear: Tympanic membrane, ear canal and external ear normal. There is no impacted cerumen.      Nose: No congestion or rhinorrhea.      Mouth/Throat:      Mouth: Mucous membranes are moist.      Pharynx: No oropharyngeal exudate or posterior oropharyngeal erythema.   Eyes:      General:         Right eye: No discharge.         Left eye: No discharge.      Extraocular Movements: Extraocular movements intact.      Pupils: Pupils are equal, round, and reactive to light.   Cardiovascular:      Rate and Rhythm: Regular rhythm. Bradycardia present.      Pulses: Normal pulses.      Heart sounds: Normal heart sounds.   Pulmonary:      Effort: Pulmonary effort is normal. No respiratory " distress.      Breath sounds: Normal breath sounds. No stridor. No wheezing, rhonchi or rales.   Chest:      Chest wall: No tenderness.   Abdominal:      General: Abdomen is flat. Bowel sounds are normal.      Palpations: Abdomen is soft.      Tenderness: There is no abdominal tenderness. There is no right CVA tenderness or left CVA tenderness.   Musculoskeletal:         General: Normal range of motion.      Cervical back: Normal range of motion and neck supple. No tenderness.   Lymphadenopathy:      Cervical: No cervical adenopathy.   Skin:     General: Skin is warm and dry.      Capillary Refill: Capillary refill takes less than 2 seconds.   Neurological:      General: No focal deficit present.      Mental Status: She is alert and oriented to person, place, and time. Mental status is at baseline.   Psychiatric:         Mood and Affect: Mood normal.         Behavior: Behavior normal.         Thought Content: Thought content normal.         Judgment: Judgment normal.       POC urine glucose 500, leukocytes small  Assessment/Plan:   Assessment    1. Fatigue, unspecified type  CBC WITH DIFFERENTIAL    Comp Metabolic Panel    CoV-2 and Flu A/B by PCR (24 hour In-House): Collect NP swab in VTM    POCT Urinalysis   2. Malaise  CBC WITH DIFFERENTIAL    Comp Metabolic Panel    CoV-2 and Flu A/B by PCR (24 hour In-House): Collect NP swab in VTM    POCT Urinalysis   3. Dizziness  CBC WITH DIFFERENTIAL    Comp Metabolic Panel    CoV-2 and Flu A/B by PCR (24 hour In-House): Collect NP swab in VTM    POCT Urinalysis     Differential diagnoses discussed with patient.  Due to body aches, malaise and fatigue COVID and flu PCR sent for evaluation.  CBC and CMP also ordered in the clinic and drawn by myself for evaluation of symptoms.  I will notify her of results.  Strict ER precautions given.  AVS handout given and reviewed with patient. Pt educated on red flags and when to seek treatment back in ER or UC.

## 2022-06-07 ENCOUNTER — TELEPHONE (OUTPATIENT)
Dept: URGENT CARE | Facility: PHYSICIAN GROUP | Age: 77
End: 2022-06-07
Payer: MEDICARE

## 2022-06-07 ENCOUNTER — TELEPHONE (OUTPATIENT)
Dept: URGENT CARE | Facility: CLINIC | Age: 77
End: 2022-06-07

## 2022-06-07 DIAGNOSIS — R42 DIZZINESS: ICD-10-CM

## 2022-06-07 DIAGNOSIS — R53.81 MALAISE: ICD-10-CM

## 2022-06-07 DIAGNOSIS — R53.83 FATIGUE, UNSPECIFIED TYPE: ICD-10-CM

## 2022-06-07 LAB
FLUAV RNA SPEC QL NAA+PROBE: NEGATIVE
FLUBV RNA SPEC QL NAA+PROBE: NEGATIVE
SARS-COV-2 RNA RESP QL NAA+PROBE: NOTDETECTED
SPECIMEN SOURCE: NORMAL

## 2022-06-09 ENCOUNTER — APPOINTMENT (OUTPATIENT)
Dept: PHYSICAL THERAPY | Facility: REHABILITATION | Age: 77
End: 2022-06-09
Attending: FAMILY MEDICINE
Payer: MEDICARE

## 2022-06-24 ENCOUNTER — TELEPHONE (OUTPATIENT)
Dept: HEALTH INFORMATION MANAGEMENT | Facility: OTHER | Age: 77
End: 2022-06-24

## 2022-06-30 ENCOUNTER — APPOINTMENT (OUTPATIENT)
Dept: PHYSICAL THERAPY | Facility: REHABILITATION | Age: 77
End: 2022-06-30
Attending: FAMILY MEDICINE
Payer: MEDICARE

## 2022-06-30 ENCOUNTER — TELEPHONE (OUTPATIENT)
Dept: PHYSICAL THERAPY | Facility: REHABILITATION | Age: 77
End: 2022-06-30
Payer: MEDICARE

## 2022-06-30 NOTE — OP THERAPY DISCHARGE SUMMARY
Outpatient Physical Therapy  DISCHARGE SUMMARY NOTE      [facility] [address].  [address]  [place] [address]  Phone:  [phone]  Fax:  [phone]    Date of Visit: 06/30/2022    Patient: Pam Hernandez  YOB: 1945  MRN: [id number]     Referring Provider: No referring provider defined for this encounter.   Referring Diagnosis No admission diagnoses are documented for this encounter.     Your patient is being discharged from Physical Therapy with the following comments:   · non compliance with medical follow up   · Self Discharge    Comments:  Patient reports she has tried to contact MD, however hasn't received a response.  Reports she will go elsewhere for physical therapy.      Dejuan Seaman, PT    Date: 6/30/2022

## 2022-06-30 NOTE — OP THERAPY DAILY TREATMENT
Patient did not follow up with MD, and upset today as she cannot be seen.  Patient reports she tried yesterday, but they did not provide letter.  AT this time, as patient had ER visit for high sugars, and left without being seen, she needs clearance before starting exercise routine.  Patient reports she will just try another physical therapy place.       This PT educated patient on reason why she cannot be seen.  Patient remains upset.

## 2022-07-03 NOTE — PROGRESS NOTES
Outbound call to Pam for post discharge medication review. Patient states she had not yet picked up the amlodipine. She is aware to stop glimepiride and lisinopril. Reviewed appointment date and time with nephrology. Patient was not at home to conduct full medication review. She requested return call at 4 pm.     1606: Outbound call to Pam for scheduled medication review. Left VM with my contact information and request for return call. Of note, recommended renal dosing for gabapentin in patients with CrCl of 15-29 mL/min is 200 mg to 700 mg once daily. Patient's CrCl is 28 mL/min and is currently taking 300 mg TID for total dose of 900 mg/ day. Will send message to nephrologist.     Mojgan Hong, PharmD     show

## 2022-07-07 ENCOUNTER — APPOINTMENT (OUTPATIENT)
Dept: PHYSICAL THERAPY | Facility: REHABILITATION | Age: 77
End: 2022-07-07
Payer: MEDICARE

## 2022-07-07 ENCOUNTER — TELEPHONE (OUTPATIENT)
Dept: HEALTH INFORMATION MANAGEMENT | Facility: OTHER | Age: 77
End: 2022-07-07
Payer: MEDICARE

## 2022-07-14 PROBLEM — E87.5 HYPERKALEMIA: Status: RESOLVED | Noted: 2019-01-23 | Resolved: 2022-07-14

## 2022-07-14 PROBLEM — E87.1 HYPONATREMIA: Status: RESOLVED | Noted: 2019-01-23 | Resolved: 2022-07-14

## 2022-07-14 PROBLEM — R74.8 ELEVATED LIPASE: Status: RESOLVED | Noted: 2019-01-23 | Resolved: 2022-07-14

## 2022-07-14 PROBLEM — I73.9 PERIPHERAL VASCULAR DISEASE, UNSPECIFIED (HCC): Status: ACTIVE | Noted: 2022-07-14

## 2022-07-14 PROBLEM — K21.9 GERD (GASTROESOPHAGEAL REFLUX DISEASE): Status: ACTIVE | Noted: 2022-07-14

## 2022-07-14 PROBLEM — H40.9 GLAUCOMA: Status: ACTIVE | Noted: 2022-07-14

## 2022-07-14 PROBLEM — N18.4 CHRONIC KIDNEY DISEASE (CKD), STAGE IV (SEVERE) (HCC): Status: ACTIVE | Noted: 2019-01-23

## 2022-09-09 ENCOUNTER — APPOINTMENT (OUTPATIENT)
Dept: RADIOLOGY | Facility: MEDICAL CENTER | Age: 77
DRG: 065 | End: 2022-09-09
Attending: EMERGENCY MEDICINE
Payer: MEDICARE

## 2022-09-09 ENCOUNTER — HOSPITAL ENCOUNTER (INPATIENT)
Facility: MEDICAL CENTER | Age: 77
LOS: 1 days | DRG: 065 | End: 2022-09-12
Attending: EMERGENCY MEDICINE | Admitting: STUDENT IN AN ORGANIZED HEALTH CARE EDUCATION/TRAINING PROGRAM
Payer: MEDICARE

## 2022-09-09 DIAGNOSIS — E53.8 LOW SERUM VITAMIN B12: ICD-10-CM

## 2022-09-09 DIAGNOSIS — R42 DIZZINESS: Primary | ICD-10-CM

## 2022-09-09 DIAGNOSIS — G62.9 NEUROPATHY: ICD-10-CM

## 2022-09-09 DIAGNOSIS — R51.9 FREQUENT HEADACHES: ICD-10-CM

## 2022-09-09 DIAGNOSIS — R26.81 UNSTEADINESS: ICD-10-CM

## 2022-09-09 DIAGNOSIS — R07.9 CHEST PAIN, UNSPECIFIED TYPE: ICD-10-CM

## 2022-09-09 PROBLEM — I16.0 HYPERTENSIVE URGENCY: Status: RESOLVED | Noted: 2022-09-09 | Resolved: 2022-09-09

## 2022-09-09 PROBLEM — E11.9 DM (DIABETES MELLITUS) (HCC): Status: ACTIVE | Noted: 2019-01-23

## 2022-09-09 PROBLEM — I16.0 HYPERTENSIVE URGENCY: Status: ACTIVE | Noted: 2022-09-09

## 2022-09-09 PROBLEM — I16.1 HYPERTENSIVE EMERGENCY: Status: ACTIVE | Noted: 2022-09-09

## 2022-09-09 PROBLEM — N17.9 AKI (ACUTE KIDNEY INJURY) (HCC): Status: ACTIVE | Noted: 2022-09-09

## 2022-09-09 PROBLEM — Z71.89 ADVANCED CARE PLANNING/COUNSELING DISCUSSION: Status: ACTIVE | Noted: 2019-06-17

## 2022-09-09 LAB
ALBUMIN SERPL BCP-MCNC: 4.2 G/DL (ref 3.2–4.9)
ALBUMIN/GLOB SERPL: 1.3 G/DL
ALP SERPL-CCNC: 122 U/L (ref 30–99)
ALT SERPL-CCNC: 16 U/L (ref 2–50)
ANION GAP SERPL CALC-SCNC: 13 MMOL/L (ref 7–16)
APPEARANCE UR: CLEAR
AST SERPL-CCNC: 31 U/L (ref 12–45)
BACTERIA #/AREA URNS HPF: ABNORMAL /HPF
BASOPHILS # BLD AUTO: 0.8 % (ref 0–1.8)
BASOPHILS # BLD: 0.04 K/UL (ref 0–0.12)
BILIRUB SERPL-MCNC: 0.3 MG/DL (ref 0.1–1.5)
BILIRUB UR QL STRIP.AUTO: NEGATIVE
BUN SERPL-MCNC: 44 MG/DL (ref 8–22)
CALCIUM SERPL-MCNC: 9.4 MG/DL (ref 8.5–10.5)
CHLORIDE SERPL-SCNC: 100 MMOL/L (ref 96–112)
CO2 SERPL-SCNC: 21 MMOL/L (ref 20–33)
COLOR UR: YELLOW
CREAT SERPL-MCNC: 2.43 MG/DL (ref 0.5–1.4)
EKG IMPRESSION: NORMAL
EOSINOPHIL # BLD AUTO: 0.09 K/UL (ref 0–0.51)
EOSINOPHIL NFR BLD: 1.8 % (ref 0–6.9)
EPI CELLS #/AREA URNS HPF: NEGATIVE /HPF
ERYTHROCYTE [DISTWIDTH] IN BLOOD BY AUTOMATED COUNT: 44.1 FL (ref 35.9–50)
EST. AVERAGE GLUCOSE BLD GHB EST-MCNC: 200 MG/DL
GFR SERPLBLD CREATININE-BSD FMLA CKD-EPI: 20 ML/MIN/1.73 M 2
GLOBULIN SER CALC-MCNC: 3.3 G/DL (ref 1.9–3.5)
GLUCOSE BLD STRIP.AUTO-MCNC: 78 MG/DL (ref 65–99)
GLUCOSE SERPL-MCNC: 209 MG/DL (ref 65–99)
GLUCOSE UR STRIP.AUTO-MCNC: 250 MG/DL
HBA1C MFR BLD: 8.6 % (ref 4–5.6)
HCT VFR BLD AUTO: 40.4 % (ref 37–47)
HGB BLD-MCNC: 14.1 G/DL (ref 12–16)
HYALINE CASTS #/AREA URNS LPF: ABNORMAL /LPF
IMM GRANULOCYTES # BLD AUTO: 0.02 K/UL (ref 0–0.11)
IMM GRANULOCYTES NFR BLD AUTO: 0.4 % (ref 0–0.9)
KETONES UR STRIP.AUTO-MCNC: NEGATIVE MG/DL
LEUKOCYTE ESTERASE UR QL STRIP.AUTO: ABNORMAL
LYMPHOCYTES # BLD AUTO: 1.42 K/UL (ref 1–4.8)
LYMPHOCYTES NFR BLD: 28.7 % (ref 22–41)
MCH RBC QN AUTO: 31.8 PG (ref 27–33)
MCHC RBC AUTO-ENTMCNC: 34.9 G/DL (ref 33.6–35)
MCV RBC AUTO: 91 FL (ref 81.4–97.8)
MICRO URNS: ABNORMAL
MONOCYTES # BLD AUTO: 0.39 K/UL (ref 0–0.85)
MONOCYTES NFR BLD AUTO: 7.9 % (ref 0–13.4)
NEUTROPHILS # BLD AUTO: 2.99 K/UL (ref 2–7.15)
NEUTROPHILS NFR BLD: 60.4 % (ref 44–72)
NITRITE UR QL STRIP.AUTO: POSITIVE
NRBC # BLD AUTO: 0 K/UL
NRBC BLD-RTO: 0 /100 WBC
PH UR STRIP.AUTO: 6.5 [PH] (ref 5–8)
PLATELET # BLD AUTO: 180 K/UL (ref 164–446)
PMV BLD AUTO: 10.8 FL (ref 9–12.9)
POTASSIUM SERPL-SCNC: 4.3 MMOL/L (ref 3.6–5.5)
PROT SERPL-MCNC: 7.5 G/DL (ref 6–8.2)
PROT UR QL STRIP: NEGATIVE MG/DL
RBC # BLD AUTO: 4.44 M/UL (ref 4.2–5.4)
RBC # URNS HPF: ABNORMAL /HPF
RBC UR QL AUTO: NEGATIVE
SODIUM SERPL-SCNC: 134 MMOL/L (ref 135–145)
SP GR UR STRIP.AUTO: 1.01
TROPONIN T SERPL-MCNC: 24 NG/L (ref 6–19)
TROPONIN T SERPL-MCNC: 27 NG/L (ref 6–19)
UROBILINOGEN UR STRIP.AUTO-MCNC: 0.2 MG/DL
WBC # BLD AUTO: 5 K/UL (ref 4.8–10.8)
WBC #/AREA URNS HPF: ABNORMAL /HPF

## 2022-09-09 PROCEDURE — 71045 X-RAY EXAM CHEST 1 VIEW: CPT

## 2022-09-09 PROCEDURE — G0378 HOSPITAL OBSERVATION PER HR: HCPCS

## 2022-09-09 PROCEDURE — 93005 ELECTROCARDIOGRAM TRACING: CPT | Performed by: EMERGENCY MEDICINE

## 2022-09-09 PROCEDURE — 700102 HCHG RX REV CODE 250 W/ 637 OVERRIDE(OP): Performed by: STUDENT IN AN ORGANIZED HEALTH CARE EDUCATION/TRAINING PROGRAM

## 2022-09-09 PROCEDURE — 99220 PR INITIAL OBSERVATION CARE,LEVL III: CPT | Performed by: STUDENT IN AN ORGANIZED HEALTH CARE EDUCATION/TRAINING PROGRAM

## 2022-09-09 PROCEDURE — 93005 ELECTROCARDIOGRAM TRACING: CPT

## 2022-09-09 PROCEDURE — 96375 TX/PRO/DX INJ NEW DRUG ADDON: CPT

## 2022-09-09 PROCEDURE — 36415 COLL VENOUS BLD VENIPUNCTURE: CPT

## 2022-09-09 PROCEDURE — A9270 NON-COVERED ITEM OR SERVICE: HCPCS | Performed by: STUDENT IN AN ORGANIZED HEALTH CARE EDUCATION/TRAINING PROGRAM

## 2022-09-09 PROCEDURE — 84484 ASSAY OF TROPONIN QUANT: CPT

## 2022-09-09 PROCEDURE — 99285 EMERGENCY DEPT VISIT HI MDM: CPT

## 2022-09-09 PROCEDURE — 80053 COMPREHEN METABOLIC PANEL: CPT

## 2022-09-09 PROCEDURE — 81001 URINALYSIS AUTO W/SCOPE: CPT

## 2022-09-09 PROCEDURE — 70450 CT HEAD/BRAIN W/O DYE: CPT

## 2022-09-09 PROCEDURE — 83036 HEMOGLOBIN GLYCOSYLATED A1C: CPT

## 2022-09-09 PROCEDURE — 82962 GLUCOSE BLOOD TEST: CPT

## 2022-09-09 PROCEDURE — 85025 COMPLETE CBC W/AUTO DIFF WBC: CPT

## 2022-09-09 PROCEDURE — 700101 HCHG RX REV CODE 250: Performed by: STUDENT IN AN ORGANIZED HEALTH CARE EDUCATION/TRAINING PROGRAM

## 2022-09-09 PROCEDURE — 700105 HCHG RX REV CODE 258: Performed by: STUDENT IN AN ORGANIZED HEALTH CARE EDUCATION/TRAINING PROGRAM

## 2022-09-09 RX ORDER — FUROSEMIDE 20 MG/1
20 TABLET ORAL
COMMUNITY

## 2022-09-09 RX ORDER — LISINOPRIL 2.5 MG/1
10 TABLET ORAL DAILY
Status: ON HOLD | COMMUNITY
End: 2022-09-12

## 2022-09-09 RX ORDER — PIOGLITAZONEHYDROCHLORIDE 30 MG/1
30 TABLET ORAL DAILY
Status: ON HOLD | COMMUNITY
End: 2022-09-12

## 2022-09-09 RX ORDER — INSULIN LISPRO 100 [IU]/ML
0.2 INJECTION, SOLUTION INTRAVENOUS; SUBCUTANEOUS
Status: DISCONTINUED | OUTPATIENT
Start: 2022-09-10 | End: 2022-09-10

## 2022-09-09 RX ORDER — SODIUM CHLORIDE 9 MG/ML
INJECTION, SOLUTION INTRAVENOUS CONTINUOUS
Status: ACTIVE | OUTPATIENT
Start: 2022-09-09 | End: 2022-09-10

## 2022-09-09 RX ORDER — INSULIN DEGLUDEC 200 U/ML
11 INJECTION, SOLUTION SUBCUTANEOUS DAILY
Status: ON HOLD | COMMUNITY
End: 2022-09-12

## 2022-09-09 RX ORDER — LABETALOL HYDROCHLORIDE 5 MG/ML
10 INJECTION, SOLUTION INTRAVENOUS EVERY 4 HOURS PRN
Status: DISCONTINUED | OUTPATIENT
Start: 2022-09-09 | End: 2022-09-10

## 2022-09-09 RX ORDER — POTASSIUM CHLORIDE 750 MG/1
10 TABLET, FILM COATED, EXTENDED RELEASE ORAL 2 TIMES DAILY
Status: ON HOLD | COMMUNITY
End: 2022-09-12

## 2022-09-09 RX ORDER — CARBOXYMETHYLCELLULOSE SODIUM 5 MG/ML
1 SOLUTION/ DROPS OPHTHALMIC
COMMUNITY

## 2022-09-09 RX ORDER — INSULIN LISPRO 100 [IU]/ML
1-6 INJECTION, SOLUTION INTRAVENOUS; SUBCUTANEOUS
Status: DISCONTINUED | OUTPATIENT
Start: 2022-09-09 | End: 2022-09-10

## 2022-09-09 RX ORDER — HEPARIN SODIUM 5000 [USP'U]/ML
5000 INJECTION, SOLUTION INTRAVENOUS; SUBCUTANEOUS EVERY 8 HOURS
Status: DISCONTINUED | OUTPATIENT
Start: 2022-09-09 | End: 2022-09-12 | Stop reason: HOSPADM

## 2022-09-09 RX ORDER — ACETAMINOPHEN 325 MG/1
650 TABLET ORAL EVERY 6 HOURS PRN
Status: DISCONTINUED | OUTPATIENT
Start: 2022-09-09 | End: 2022-09-12 | Stop reason: HOSPADM

## 2022-09-09 RX ORDER — FERROUS SULFATE 325(65) MG
162.5 TABLET ORAL DAILY
Status: DISCONTINUED | OUTPATIENT
Start: 2022-09-10 | End: 2022-09-12 | Stop reason: HOSPADM

## 2022-09-09 RX ORDER — GABAPENTIN 300 MG/1
300 CAPSULE ORAL 2 TIMES DAILY
Status: DISCONTINUED | OUTPATIENT
Start: 2022-09-09 | End: 2022-09-12 | Stop reason: HOSPADM

## 2022-09-09 RX ORDER — HYDROCHLOROTHIAZIDE 25 MG/1
12.5 TABLET ORAL DAILY
Status: DISCONTINUED | OUTPATIENT
Start: 2022-09-10 | End: 2022-09-11

## 2022-09-09 RX ADMIN — LABETALOL HYDROCHLORIDE 10 MG: 5 INJECTION, SOLUTION INTRAVENOUS at 20:11

## 2022-09-09 RX ADMIN — SODIUM CHLORIDE: 9 INJECTION, SOLUTION INTRAVENOUS at 20:22

## 2022-09-09 RX ADMIN — GABAPENTIN 300 MG: 300 CAPSULE ORAL at 20:21

## 2022-09-09 ASSESSMENT — LIFESTYLE VARIABLES
TOTAL SCORE: 0
EVER FELT BAD OR GUILTY ABOUT YOUR DRINKING: NO
TOTAL SCORE: 0
HAVE YOU EVER FELT YOU SHOULD CUT DOWN ON YOUR DRINKING: NO
ALCOHOL_USE: NO
HAVE PEOPLE ANNOYED YOU BY CRITICIZING YOUR DRINKING: NO
TOTAL SCORE: 0
CONSUMPTION TOTAL: INCOMPLETE
DO YOU DRINK ALCOHOL: NO
EVER HAD A DRINK FIRST THING IN THE MORNING TO STEADY YOUR NERVES TO GET RID OF A HANGOVER: NO

## 2022-09-09 ASSESSMENT — ENCOUNTER SYMPTOMS
RESPIRATORY NEGATIVE: 1
CARDIOVASCULAR NEGATIVE: 1
GASTROINTESTINAL NEGATIVE: 1
PSYCHIATRIC NEGATIVE: 1
HEADACHES: 1
WEAKNESS: 1
EYES NEGATIVE: 1
MUSCULOSKELETAL NEGATIVE: 1

## 2022-09-09 ASSESSMENT — PATIENT HEALTH QUESTIONNAIRE - PHQ9
SUM OF ALL RESPONSES TO PHQ9 QUESTIONS 1 AND 2: 0
1. LITTLE INTEREST OR PLEASURE IN DOING THINGS: NOT AT ALL
2. FEELING DOWN, DEPRESSED, IRRITABLE, OR HOPELESS: NOT AT ALL

## 2022-09-09 ASSESSMENT — COGNITIVE AND FUNCTIONAL STATUS - GENERAL
MOBILITY SCORE: 18
SUGGESTED CMS G CODE MODIFIER MOBILITY: CK
STANDING UP FROM CHAIR USING ARMS: A LITTLE
CLIMB 3 TO 5 STEPS WITH RAILING: A LITTLE
MOVING FROM LYING ON BACK TO SITTING ON SIDE OF FLAT BED: A LITTLE
HELP NEEDED FOR BATHING: A LITTLE
MOVING TO AND FROM BED TO CHAIR: A LITTLE
WALKING IN HOSPITAL ROOM: A LITTLE
TOILETING: A LITTLE
SUGGESTED CMS G CODE MODIFIER DAILY ACTIVITY: CJ
TURNING FROM BACK TO SIDE WHILE IN FLAT BAD: A LITTLE
DRESSING REGULAR LOWER BODY CLOTHING: A LITTLE
DAILY ACTIVITIY SCORE: 21

## 2022-09-09 ASSESSMENT — FIBROSIS 4 INDEX
FIB4 SCORE: 2.74
FIB4 SCORE: 3.32

## 2022-09-09 NOTE — ED TRIAGE NOTES
"Chief Complaint   Patient presents with   • Headache     X1 week, intermittent 9-10/10 pain. She states it has been causing her to become dizzy, unbalanced, and fatigued. This pain interrupts her ability to perform ADLs at home      Patient BIB EMS from home for complaints of a headache lasting about a week with associated high blood pressure reported by EMS. Pt also reports dull 6/10 pain in her left chest/shoulder area, radiating up to her right ear and jaw. /90 per EMS, Pt reports that she took her BP med this morning. See ER vital signs below. ERP to see.    BP (!) 172/80   Pulse (!) 53   Temp 36.7 °C (98 °F) (Temporal)   Resp 17   Ht 1.575 m (5' 2\")   Wt 73.5 kg (162 lb)   LMP  (LMP Unknown)   SpO2 94%   BMI 29.63 kg/m²    "

## 2022-09-09 NOTE — ED NOTES
Med Rec Complete per patient's preferred pharmacy   Allergies Reviewed with patient  No antibiotics within the last 30 days  Patient's Preferred Pharmacy:  [facility]      Able to obtain current med list from patient's preferred pharmacy, but last doses taken are unknown with first med rec attempt due to patient not being able to recall which medications she takes or last doses taken. The patient has assistance from home health nurse that comes to her home, but the granddaughter at bedside (with the patient) reports that she would rather get the med list from the patient's pharmacy, and does not trust the nurse that comes out to the patient's home.        Discussion with the patient about the levothyroxine 25 mcg that was being filled previously, but has not been filled within the last 90 days.  The patient reports that she had been taking that medication for 10 years and was discontinued last year by her doctor after having her labs done and reviewed by her doctor.  The last time this medication was filled was 7/20/21.

## 2022-09-10 ENCOUNTER — APPOINTMENT (OUTPATIENT)
Dept: RADIOLOGY | Facility: MEDICAL CENTER | Age: 77
DRG: 065 | End: 2022-09-10
Payer: MEDICARE

## 2022-09-10 ENCOUNTER — APPOINTMENT (OUTPATIENT)
Dept: CARDIOLOGY | Facility: MEDICAL CENTER | Age: 77
DRG: 065 | End: 2022-09-10
Attending: STUDENT IN AN ORGANIZED HEALTH CARE EDUCATION/TRAINING PROGRAM
Payer: MEDICARE

## 2022-09-10 PROBLEM — N18.9 CHRONIC KIDNEY DISEASE: Status: ACTIVE | Noted: 2022-09-09

## 2022-09-10 PROBLEM — I51.89 DIASTOLIC DYSFUNCTION: Status: ACTIVE | Noted: 2022-09-10

## 2022-09-10 PROBLEM — R42 DIZZINESS: Status: ACTIVE | Noted: 2022-09-10

## 2022-09-10 PROBLEM — G62.9 NEUROPATHY: Status: ACTIVE | Noted: 2022-09-10

## 2022-09-10 PROBLEM — R32 URINARY INCONTINENCE: Status: ACTIVE | Noted: 2022-09-10

## 2022-09-10 PROBLEM — R00.1 SINUS BRADYCARDIA: Status: ACTIVE | Noted: 2022-09-10

## 2022-09-10 PROBLEM — N18.4 CKD (CHRONIC KIDNEY DISEASE) STAGE 4, GFR 15-29 ML/MIN (HCC): Status: ACTIVE | Noted: 2022-09-10

## 2022-09-10 PROBLEM — I10 HYPERTENSION: Status: ACTIVE | Noted: 2022-09-10

## 2022-09-10 LAB
ANION GAP SERPL CALC-SCNC: 12 MMOL/L (ref 7–16)
BUN SERPL-MCNC: 40 MG/DL (ref 8–22)
CALCIUM SERPL-MCNC: 9 MG/DL (ref 8.5–10.5)
CHLORIDE SERPL-SCNC: 104 MMOL/L (ref 96–112)
CO2 SERPL-SCNC: 20 MMOL/L (ref 20–33)
CORTIS SERPL-MCNC: 11 UG/DL (ref 0–23)
CREAT SERPL-MCNC: 2.17 MG/DL (ref 0.5–1.4)
ERYTHROCYTE [SEDIMENTATION RATE] IN BLOOD BY WESTERGREN METHOD: 19 MM/HOUR (ref 0–25)
GFR SERPLBLD CREATININE-BSD FMLA CKD-EPI: 23 ML/MIN/1.73 M 2
GLUCOSE BLD STRIP.AUTO-MCNC: 126 MG/DL (ref 65–99)
GLUCOSE BLD STRIP.AUTO-MCNC: 225 MG/DL (ref 65–99)
GLUCOSE BLD STRIP.AUTO-MCNC: 227 MG/DL (ref 65–99)
GLUCOSE BLD STRIP.AUTO-MCNC: 73 MG/DL (ref 65–99)
GLUCOSE BLD STRIP.AUTO-MCNC: 82 MG/DL (ref 65–99)
GLUCOSE BLD STRIP.AUTO-MCNC: 92 MG/DL (ref 65–99)
GLUCOSE SERPL-MCNC: 141 MG/DL (ref 65–99)
LV EJECT FRACT MOD 2C 99903: 65.84
LV EJECT FRACT MOD 4C 99902: 53.93
LV EJECT FRACT MOD BP 99901: 60.77
MAGNESIUM SERPL-MCNC: 2.2 MG/DL (ref 1.5–2.5)
PHOSPHATE SERPL-MCNC: 4.5 MG/DL (ref 2.5–4.5)
POTASSIUM SERPL-SCNC: 4 MMOL/L (ref 3.6–5.5)
SODIUM SERPL-SCNC: 136 MMOL/L (ref 135–145)
TSH SERPL DL<=0.005 MIU/L-ACNC: 4.08 UIU/ML (ref 0.38–5.33)

## 2022-09-10 PROCEDURE — A9270 NON-COVERED ITEM OR SERVICE: HCPCS | Performed by: STUDENT IN AN ORGANIZED HEALTH CARE EDUCATION/TRAINING PROGRAM

## 2022-09-10 PROCEDURE — 83735 ASSAY OF MAGNESIUM: CPT

## 2022-09-10 PROCEDURE — 87086 URINE CULTURE/COLONY COUNT: CPT

## 2022-09-10 PROCEDURE — 97162 PT EVAL MOD COMPLEX 30 MIN: CPT

## 2022-09-10 PROCEDURE — 93306 TTE W/DOPPLER COMPLETE: CPT

## 2022-09-10 PROCEDURE — 87077 CULTURE AEROBIC IDENTIFY: CPT

## 2022-09-10 PROCEDURE — 96372 THER/PROPH/DIAG INJ SC/IM: CPT

## 2022-09-10 PROCEDURE — 99225 PR SUBSEQUENT OBSERVATION CARE,LEVEL II: CPT | Mod: GC | Performed by: INTERNAL MEDICINE

## 2022-09-10 PROCEDURE — 84100 ASSAY OF PHOSPHORUS: CPT

## 2022-09-10 PROCEDURE — 51798 US URINE CAPACITY MEASURE: CPT

## 2022-09-10 PROCEDURE — 82962 GLUCOSE BLOOD TEST: CPT | Mod: 91

## 2022-09-10 PROCEDURE — 36415 COLL VENOUS BLD VENIPUNCTURE: CPT

## 2022-09-10 PROCEDURE — 84443 ASSAY THYROID STIM HORMONE: CPT

## 2022-09-10 PROCEDURE — 82533 TOTAL CORTISOL: CPT

## 2022-09-10 PROCEDURE — 700111 HCHG RX REV CODE 636 W/ 250 OVERRIDE (IP): Performed by: STUDENT IN AN ORGANIZED HEALTH CARE EDUCATION/TRAINING PROGRAM

## 2022-09-10 PROCEDURE — G0378 HOSPITAL OBSERVATION PER HR: HCPCS

## 2022-09-10 PROCEDURE — 96365 THER/PROPH/DIAG IV INF INIT: CPT

## 2022-09-10 PROCEDURE — 87186 SC STD MICRODIL/AGAR DIL: CPT

## 2022-09-10 PROCEDURE — 80048 BASIC METABOLIC PNL TOTAL CA: CPT

## 2022-09-10 PROCEDURE — 93306 TTE W/DOPPLER COMPLETE: CPT | Mod: 26 | Performed by: STUDENT IN AN ORGANIZED HEALTH CARE EDUCATION/TRAINING PROGRAM

## 2022-09-10 PROCEDURE — 700102 HCHG RX REV CODE 250 W/ 637 OVERRIDE(OP): Performed by: STUDENT IN AN ORGANIZED HEALTH CARE EDUCATION/TRAINING PROGRAM

## 2022-09-10 PROCEDURE — 97165 OT EVAL LOW COMPLEX 30 MIN: CPT

## 2022-09-10 PROCEDURE — 700102 HCHG RX REV CODE 250 W/ 637 OVERRIDE(OP)

## 2022-09-10 PROCEDURE — 85652 RBC SED RATE AUTOMATED: CPT

## 2022-09-10 PROCEDURE — 96366 THER/PROPH/DIAG IV INF ADDON: CPT

## 2022-09-10 RX ORDER — INSULIN LISPRO 100 [IU]/ML
0.2 INJECTION, SOLUTION INTRAVENOUS; SUBCUTANEOUS
Status: DISCONTINUED | OUTPATIENT
Start: 2022-09-10 | End: 2022-09-12 | Stop reason: HOSPADM

## 2022-09-10 RX ORDER — HYDRALAZINE HYDROCHLORIDE 20 MG/ML
10 INJECTION INTRAMUSCULAR; INTRAVENOUS EVERY 6 HOURS PRN
Status: DISCONTINUED | OUTPATIENT
Start: 2022-09-10 | End: 2022-09-12 | Stop reason: HOSPADM

## 2022-09-10 RX ORDER — INSULIN LISPRO 100 [IU]/ML
1-6 INJECTION, SOLUTION INTRAVENOUS; SUBCUTANEOUS
Status: DISCONTINUED | OUTPATIENT
Start: 2022-09-10 | End: 2022-09-10

## 2022-09-10 RX ORDER — INSULIN LISPRO 100 [IU]/ML
0.2 INJECTION, SOLUTION INTRAVENOUS; SUBCUTANEOUS
Status: DISCONTINUED | OUTPATIENT
Start: 2022-09-10 | End: 2022-09-10

## 2022-09-10 RX ORDER — INSULIN LISPRO 100 [IU]/ML
1-6 INJECTION, SOLUTION INTRAVENOUS; SUBCUTANEOUS
Status: DISCONTINUED | OUTPATIENT
Start: 2022-09-10 | End: 2022-09-12 | Stop reason: HOSPADM

## 2022-09-10 RX ADMIN — HEPARIN SODIUM 5000 UNITS: 5000 INJECTION, SOLUTION INTRAVENOUS; SUBCUTANEOUS at 05:54

## 2022-09-10 RX ADMIN — INSULIN GLARGINE-YFGN 14 UNITS: 100 INJECTION, SOLUTION SUBCUTANEOUS at 06:01

## 2022-09-10 RX ADMIN — HEPARIN SODIUM 5000 UNITS: 5000 INJECTION, SOLUTION INTRAVENOUS; SUBCUTANEOUS at 20:11

## 2022-09-10 RX ADMIN — HEPARIN SODIUM 5000 UNITS: 5000 INJECTION, SOLUTION INTRAVENOUS; SUBCUTANEOUS at 13:47

## 2022-09-10 RX ADMIN — GABAPENTIN 300 MG: 300 CAPSULE ORAL at 18:50

## 2022-09-10 RX ADMIN — INSULIN LISPRO 5 UNITS: 100 INJECTION, SOLUTION INTRAVENOUS; SUBCUTANEOUS at 13:47

## 2022-09-10 RX ADMIN — INSULIN LISPRO 2 UNITS: 100 INJECTION, SOLUTION INTRAVENOUS; SUBCUTANEOUS at 13:47

## 2022-09-10 RX ADMIN — CEFTRIAXONE SODIUM 1 G: 10 INJECTION, POWDER, FOR SOLUTION INTRAVENOUS at 05:59

## 2022-09-10 RX ADMIN — INSULIN LISPRO 5 UNITS: 100 INJECTION, SOLUTION INTRAVENOUS; SUBCUTANEOUS at 18:47

## 2022-09-10 RX ADMIN — FUROSEMIDE 60 MG: 40 TABLET ORAL at 05:49

## 2022-09-10 RX ADMIN — FERROUS SULFATE TAB 325 MG (65 MG ELEMENTAL FE) 162.5 MG: 325 (65 FE) TAB at 05:49

## 2022-09-10 RX ADMIN — GABAPENTIN 300 MG: 300 CAPSULE ORAL at 05:49

## 2022-09-10 RX ADMIN — HYDROCHLOROTHIAZIDE 12.5 MG: 25 TABLET ORAL at 05:49

## 2022-09-10 ASSESSMENT — ENCOUNTER SYMPTOMS
SINUS PAIN: 0
SHORTNESS OF BREATH: 0
PALPITATIONS: 0
DEPRESSION: 0
SORE THROAT: 0
WEAKNESS: 1
ABDOMINAL PAIN: 0
BRUISES/BLEEDS EASILY: 0
DOUBLE VISION: 0
SENSORY CHANGE: 0
HEARTBURN: 0
MYALGIAS: 0
BLURRED VISION: 0
CHILLS: 0
TINGLING: 0
NAUSEA: 0
DIZZINESS: 1
VOMITING: 0
FEVER: 0
HEMOPTYSIS: 0
HEADACHES: 0
COUGH: 0

## 2022-09-10 ASSESSMENT — COGNITIVE AND FUNCTIONAL STATUS - GENERAL
TOILETING: A LITTLE
SUGGESTED CMS G CODE MODIFIER DAILY ACTIVITY: CJ
HELP NEEDED FOR BATHING: A LITTLE
SUGGESTED CMS G CODE MODIFIER MOBILITY: CJ
WALKING IN HOSPITAL ROOM: A LITTLE
MOBILITY SCORE: 22
DAILY ACTIVITIY SCORE: 21
DRESSING REGULAR LOWER BODY CLOTHING: A LITTLE
CLIMB 3 TO 5 STEPS WITH RAILING: A LITTLE

## 2022-09-10 ASSESSMENT — GAIT ASSESSMENTS
DISTANCE (FEET): 200
GAIT LEVEL OF ASSIST: CONTACT GUARD ASSIST
ASSISTIVE DEVICE: HAND HELD ASSIST

## 2022-09-10 ASSESSMENT — ACTIVITIES OF DAILY LIVING (ADL): TOILETING: INDEPENDENT

## 2022-09-10 ASSESSMENT — FIBROSIS 4 INDEX: FIB4 SCORE: 3.32

## 2022-09-10 NOTE — PROGRESS NOTES
[place]. Pt oriented to room, unit, and plan of care. Tele-monitor placed and monitor room notified, SR 60. All questions answered at this time. Call light within reach, fall precautions in place.

## 2022-09-10 NOTE — THERAPY
Physical Therapy   Initial Evaluation     Patient Name: Pam Hernandez  Age:  77 y.o., Sex:  female  Medical Record #: [id number]  Today's Date: 9/10/2022     Precautions  Precautions: Fall Risk    Assessment  Pt presents with impaired activity tolerance and dynamic balance associated with chief complaint of headache, CP and HTN, found to have SBP of 230 in ER, pending echo; troponins 27; ongoing HA. Pt with negative orthostatics, /63 after 200ft ambulation; did require CGA, able to ambulate with supervision with FWW but does not use at baseline. Pt endorses eating only one meal a day for breakfast, discussed impact on DM/activity tolerance at her current age. Pt has a daily visiting RN and meals from [facility]. Pt wishes to dc home when medically appropriate to do so, anticipate this is feasiable once BP/BS are medically stable. Would benefit from home health PT for assist in transition but is not a barrier to dc if does not qualify. Will follow to progress.     Plan    Recommend Physical Therapy 3 times per week until therapy goals are met for the following treatments:  Equipment, Gait Training, Neuro Re-Education / Balance, Self Care/Home Evaluation, Therapeutic Activities, and Therapeutic Exercises               Abridged Subjective/Objective     09/10/22 1425   Prior Living Situation   Prior Services None   Housing / Facility 1 Story House   Steps Into Home 2  (has ramp)   Bathroom Set up Grab Bars;Walk In Shower   Equipment Owned Single Point Cane;Grab Bar(s) In Tub / Shower   Lives with - Patient's Self Care Capacity Alone and Able to Care For Self   Comments denies falls; qusetionable historian; eats only one meal per day per son/her report, breakfast; does have [employer] meal delivery at lunch and she has protein shakes at home but is not consistent; son at beside endorses pt being outside 8-9 hours last week both days has a daily visintg RN as well ;   Prior Level of Functional Mobility   Bed  Mobility Independent   Transfer Status Independent   Ambulation Independent   Distance Ambulation (Feet)   (to tolerance)   Assistive Devices Used None   History of Falls   Date of Last Fall   (~ 1 month ago , hit her right shoulder)   Cognition    Cognition / Consciousness WDL   Level of Consciousness Alert   Comments pleafant and cooperative; son at bedside; both feel ok with home when medically cleared   Passive ROM Lower Body   Passive ROM Lower Body WDL   Strength Lower Body   Lower Body Strength  WDL   Vision   Vision Comments reports diminishing vision in left eye (glaucoma) can read at distances; does have pintpoint pressure HA on right side of SCM insertion; upper cerical foward head posture   Balance Assessment   Sitting Balance (Static) Fair +   Sitting Balance (Dynamic) Fair   Standing Balance (Static) Fair   Standing Balance (Dynamic) Fair -   Weight Shift Sitting Good   Weight Shift Standing Fair   Comments unilatelar UE support in hallway; fair standing balancew ith use of FWW   Gait Analysis   Gait Level Of Assist Contact Guard Assist   Assistive Device Hand Held Assist   Distance (Feet) 200   # of Times Distance was Traveled 1   Weight Bearing Status full   Vision Deficits Impacting Mobility wearing glasses; diminished on left   Comments distance limited by thearpist; pt denies issues; then able to amb x 25ft wiht FWW to bathroom; /67 standing EOB, 129/63 standing post amb   Bed Mobility    Supine to Sit Modified Independent   Sit to Supine Modified Independent   Functional Mobility   Sit to Stand Supervised   Bed, Chair, Wheelchair Transfer Supervised   Short Term Goals    Short Term Goal # 1 Pt will ambulate x 150ft without device and supervision wihtin 6 visits to ensure progression to baseline.   Education Group   Role of Physical Therapist Patient Response Patient;Acceptance;Explanation;Demonstration;Action Demonstration;Verbal Demonstration   Additional Comments reinforcement of  necessity of eating/water/protein in setting of DM;

## 2022-09-10 NOTE — ASSESSMENT & PLAN NOTE
Likely this is the cause for her symptoms.  Troponin x2 minimally elevated with no EKG changes  Doubt ACS, optimize blood pressure  Labetalol IV as needed  Restart home medications

## 2022-09-10 NOTE — H&P
Hospital Medicine History & Physical Note    Date of Service  9/9/2022    Primary Care Physician  Brittni Zhou M.D.    Consultants  None    Code Status  DNAR, I OK    Chief Complaint  Chief Complaint   Patient presents with    Headache     X1 week, intermittent 9-10/10 pain. She states it has been causing her to become dizzy, unbalanced, and fatigued. This pain interrupts her ability to perform ADLs at home    Chest Pain     Right sided, dull 6/10 shoulder pain radiating to her right ear and jaw since today.       History of Presenting Illness  Pam Hernandez is a 77 y.o. female who presented 9/9/2022 with dizziness fatigue generalized weakness.  Patient reports that she has been having spells of above symptoms intermittently for the past few months, usually about once a week.  States for the last day or so this last episode was more intense.  She has been unable to ambulate and has been consuming less oral intake than usual.  She reports compliance with medications.  She has a home nurse to come and assist her, however reports that she gets her blood pressure checked daily and that it is usually within normal limits.  She decided to present to the ED to be evaluated for above symptoms.    Patient has an appointment with cardiology on 20 September for consideration of a possible pacemaker placement.  Has a history of sinus bradycardia with heart rate as low as 45.     In ED, patient found to have elevated blood pressure.  Found to have sugar 209, creatinine 2.43, troponin 24 -> 27.  EKG showing sinus bradycardia with no T wave changes.  Chest x-ray negative.    I discussed the plan of care with patient.    Review of Systems  Review of Systems   Constitutional:  Positive for malaise/fatigue.   HENT: Negative.     Eyes: Negative.    Respiratory: Negative.     Cardiovascular: Negative.    Gastrointestinal: Negative.    Genitourinary: Negative.    Musculoskeletal: Negative.    Skin: Negative.    Neurological:   Positive for weakness and headaches.   Endo/Heme/Allergies: Negative.    Psychiatric/Behavioral: Negative.       Past Medical History   has a past medical history of Acid reflux, Acute nasopharyngitis ([id number]), Anemia, Anesthesia, Arthritis, At risk for falling, Cataract, Dental disorder, Diabetes, Elevated lipase (1/23/2019), Glaucoma, Hyperkalemia (1/23/2019), Hypertension, Hypothyroid (12/10/2018), Neuropathy (HCC), Pneumonia, Psychiatric problem, Snoring, Urinary frequency, and Urinary incontinence.    Surgical History   has a past surgical history that includes us-needle core bx-breast panel; cataract extraction with iol (Bilateral); eye surgery (Left, 2018); vitrectomy posterior (Right, 12/11/2018); and baron by laparoscopy (N/A, 10/18/2019).     Family History   Family history reviewed with patient. There is no family history that is pertinent to the chief complaint.     Social History   reports that she has never smoked. She has never used smokeless tobacco. She reports that she does not drink alcohol and does not use drugs.    Allergies  Allergies   Allergen Reactions    Novocain Anaphylaxis       Medications  Prior to Admission Medications   Prescriptions Last Dose Informant Patient Reported? Taking?   Brimonidine Tartrate-Timolol 0.2-0.5 % Solution UNK at [facility] Patient's Home Pharmacy Yes No   Sig: Administer 1 Drop into both eyes 2 times a day.   Carboxymethylcellulose Sod PF 0.5 % Solution UNK at [facility] Patient's Home Pharmacy Yes Yes   Sig: Administer 1 Drop into both eyes every 2 hours while awake.   Cholecalciferol (VITAMIN D) 2000 UNIT TABS UNK at [facility] Patient's Home Pharmacy Yes No   Sig: Take 4,000 Units by mouth every day. 4000 units = 2 tablets   Empagliflozin 10 MG Tab UNK at [facility] Patient's Home Pharmacy Yes Yes   Sig: Take 10 mg by mouth every day.   Insulin Degludec (TRESIBA FLEXTOUCH) 200 UNIT/ML Solution Pen-injector UNK at [facility] Patient's Home Pharmacy Yes Yes   Sig: Inject 11 Units under the  skin every day.   ferrous sulfate 325 (65 Fe) MG tablet UINK at [facility] Patient's Home Pharmacy Yes No   Sig: Take 162.5 mg by mouth every day. 162.5 mg = 1/2 tablet   furosemide (LASIX) 20 MG Tab UNK at [facility] Patient's Home Pharmacy Yes Yes   Sig: Take 60 mg by mouth every day. 60 mg = 3 tablets   gabapentin (NEURONTIN) 100 MG Cap UNK at [facility] Patient's Home Pharmacy Yes No   Sig: Take 300 mg by mouth 2 times a day.   hydroCHLOROthiazide (HYDRODIURIL) 12.5 MG tablet UNK at [facility] Patient's Home Pharmacy Yes No   Sig: Take 12.5 mg by mouth every day.   latanoprost (XALATAN) 0.005 % Solution UNK at [facility] Patient's Home Pharmacy Yes No   Sig: Place 1 Drop in both eyes every evening.   lisinopril (PRINIVIL) 2.5 MG Tab UNK at [facility] Patient's Home Pharmacy Yes Yes   Sig: Take 2.5 mg by mouth every day.   pioglitazone (ACTOS) 30 MG Tab UNK at [facility] Patient's Home Pharmacy Yes Yes   Sig: Take 30 mg by mouth every day.   potassium chloride ER (KLOR-CON) 10 MEQ tablet UNK at [facility] Patient's Home Pharmacy Yes Yes   Sig: Take 10 mEq by mouth 2 times a day.      Facility-Administered Medications: None       Physical Exam  Temp:  [36.7 °C (98 °F)] 36.7 °C (98 °F)  Pulse:  [52-60] 52  Resp:  [16-19] 18  BP: (157-205)/(73-93) 205/83  SpO2:  [90 %-97 %] 96 %  Blood Pressure : (!) 205/83   Temperature: 36.7 °C (98 °F)   Pulse: (!) 52   Respiration: 18   Pulse Oximetry: 96 %       Physical Exam  Constitutional:       Appearance: Normal appearance. She is normal weight.   HENT:      Head: Normocephalic.      Nose: Nose normal.      Mouth/Throat:      Mouth: Mucous membranes are moist.   Eyes:      Pupils: Pupils are equal, round, and reactive to light.   Cardiovascular:      Rate and Rhythm: Regular rhythm. Bradycardia present.   Pulmonary:      Effort: Pulmonary effort is normal.      Breath sounds: Normal breath sounds.   Abdominal:      General: Abdomen is flat. Bowel sounds are normal.      Palpations: Abdomen is soft.   Musculoskeletal:          General: Normal range of motion.      Cervical back: Neck supple.   Skin:     General: Skin is warm.   Neurological:      General: No focal deficit present.      Mental Status: She is alert and oriented to person, place, and time. Mental status is at baseline.   Psychiatric:         Mood and Affect: Mood normal.         Behavior: Behavior normal.         Thought Content: Thought content normal.         Judgment: Judgment normal.       Laboratory:  Recent Labs     09/09/22  1400   WBC 5.0   RBC 4.44   HEMOGLOBIN 14.1   HEMATOCRIT 40.4   MCV 91.0   MCH 31.8   MCHC 34.9   RDW 44.1   PLATELETCT 180   MPV 10.8     Recent Labs     09/09/22  1400   SODIUM 134*   POTASSIUM 4.3   CHLORIDE 100   CO2 21   GLUCOSE 209*   BUN 44*   CREATININE 2.43*   CALCIUM 9.4     Recent Labs     09/09/22  1400   ALTSGPT 16   ASTSGOT 31   ALKPHOSPHAT 122*   TBILIRUBIN 0.3   GLUCOSE 209*         No results for input(s): NTPROBNP in the last 72 hours.      Recent Labs     09/09/22  1400 09/09/22  1618   TROPONINT 24* 27*       Imaging:  CT-HEAD W/O   Final Result         1. No acute intracranial abnormality. No evidence of acute intracranial hemorrhage or mass lesion.                     DX-CHEST-PORTABLE (1 VIEW)   Final Result      No acute cardiac or pulmonary abnormalities are identified. Lung volumes are low.          EKG:  I have personally reviewed the images and compared with prior images.    Assessment/Plan:  Justification for Admission Status  I anticipate this patient is appropriate for observation status at this time because patient has PRAMOD uncontrolled hypertension and diabetes      * Hypertensive emergency  Assessment & Plan  Likely this is the cause for her symptoms.  Troponin x2 minimally elevated in setting of PRAMOD with no EKG changes  Doubt ACS, optimize blood pressure  Labetalol IV as needed  Restart home medications    PRAMOD (acute kidney injury) (HCC)  Assessment & Plan  Noted to have PRAMOD in setting of uncontrolled blood  pressure and taking ACE inhibitor at home. Lisinopril held  Optimize blood pressure  Serial BMP    Advanced care planning/counseling discussion  Assessment & Plan  Goals of care discussed with patient and family member at bedside.  She has never discussed this before.  Patient was explained about chest compressions and intubation should her clinical condition deteriorate.  States that she would like DNR with trial intubation.     DM (diabetes mellitus) ([facility])  Assessment & Plan  Sliding scale      VTE prophylaxis: heparin ppx

## 2022-09-10 NOTE — ASSESSMENT & PLAN NOTE
*given 11 units of home DM in AM.  Concern for risk of hypoglyemia. a1c 8.6.  - reduced to 7 units which will be held until tomorrow morning and rosemary continue QAM provided that blood glucose is maintained between 140-180  - SSI  - home premeal lispro  - hypoglycemia protocol

## 2022-09-10 NOTE — ED NOTES
Bedside report given to April RN. Nurse will assume care at this time. Pt stable for transport to the floor.

## 2022-09-10 NOTE — DISCHARGE PLANNING
HTH/SCP TCN chart review completed. Collaborated with therapies. The most current review of medical record, knowledge of pt's PLOF and social support, LACE+ score of 64, 6 clicks scores of 18 mobility were considered.      Pt seen at bedside with family present. Introduced TCN program. Provided education regarding post acute levels of care. Discussed HTH/SCP plan benefits (Meds to Beds, medical uber and [facility] transitional care). Pt verbalizes understanding. She is agreeable to [facility] referral and sent via email. Would note that pt received some resources from the [facility] PTA.    Pt has been seen by PT and OT with recs for HH. Choice proactively obtained for HH, faxed to [facility] and placed original in weekend folder if needed. TCN will continue to follow and collaborate with discharge planning team as additional post acute needs arise. Thank you.     Completed today:  Choice obtained: [facility] referral sent

## 2022-09-10 NOTE — ASSESSMENT & PLAN NOTE
Likely this is the cause for her symptoms.  Troponin x2 minimally elevated in setting of PRAMOD with no EKG changes  Doubt ACS, optimize blood pressure  Labetalol IV as needed  Restart home medications

## 2022-09-10 NOTE — ASSESSMENT & PLAN NOTE
*Noticed on previous ECHO.  Likely due to long-standing hypertension.  - continue lisinopril  - hold HCTZ

## 2022-09-10 NOTE — ASSESSMENT & PLAN NOTE
*Noted to be in SB both on EKG and tele in 30s.  Currently scheduled for appt with cardiology for pacemaker evaluation on 9/20.  Pt's symptoms though can overlap with symptomatic sinus bradycardia is less likely than other etiologies.  - telemetry  - likely can be worked up on outpatient basis  - d/florinda labetalol

## 2022-09-10 NOTE — ASSESSMENT & PLAN NOTE
Goals of care discussed with patient and family member at bedside.  She has never discussed this before.  Patient was explained about chest compressions and intubation should her clinical condition deteriorate.  States that she would like DNR with trial intubation.

## 2022-09-10 NOTE — THERAPY
Occupational Therapy   Initial Evaluation     Patient Name: Pam Hernandez  Age:  77 y.o., Sex:  female  Medical Record #: [id number]  Today's Date: 9/10/2022     Precautions  Precautions: (P) Fall Risk    Assessment    Patient is 77 y.o. female admitted for headache, chest pain, HTN emergency, PRAMOD, DM. Pt normally independent with functional mobility and ADLs living in a [facility] alone, has multiple family members who live nearby and able to assist as needed. Pt able to complete functional mobility and ADLs with supervision and use of FWW. Will recommend home health at discharge to facilitate safe transition home with family support as needed. No further OT needs at this time, will complete order. Patient will not be actively followed for occupational therapy services at this time, however may be seen if requested by physician for 1 more visit within 30 days to address any discharge or equipment needs.     Plan    Recommend Occupational Therapy for Evaluation only.    DC Equipment Recommendations: (P) None  Discharge Recommendations: (P) Recommend home health for continued occupational therapy services     Objective       09/10/22 1420   Prior Living Situation   Prior Services None   Housing / Facility 1 Story House   Bathroom Set up Walk In Shower;Grab Bars   Equipment Owned Grab Bar(s) In Tub / Shower   Lives with - Patient's Self Care Capacity Alone and Able to Care For Self   Prior Level of ADL Function   Self Feeding Independent   Grooming / Hygiene Independent   Bathing Independent   Dressing Independent   Toileting Independent   Prior Level of IADL Function   Medication Management Independent   Laundry Independent   Kitchen Mobility Independent   Finances Independent   Home Management Independent   Shopping Independent   Prior Level Of Mobility Independent Without Device in Community   Driving / Transportation Driving Independent   Occupation (Pre-Hospital Vocational) Retired Due To Age   History of Falls    History of Falls Yes   Precautions   Precautions Fall Risk   Pain 0 - 10 Group   Therapist Pain Assessment Nurse Notified;Post Activity Pain Same as Prior to Activity   Cognition    Cognition / Consciousness WDL   Level of Consciousness Alert   Comments Very pleasant, cooperative, receptive to therapy   Active ROM Upper Body   Active ROM Upper Body  WDL   Dominant Hand Right   Strength Upper Body   Upper Body Strength  WDL   Sensation Upper Body   Upper Extremity Sensation  WDL   Upper Body Muscle Tone   Upper Body Muscle Tone  WDL   Neurological Concerns   Neurological Concerns No   Coordination Upper Body   Coordination WDL   Balance Assessment   Sitting Balance (Static) Fair   Sitting Balance (Dynamic) Fair   Standing Balance (Static) Fair   Standing Balance (Dynamic) Fair   Weight Shift Sitting Fair   Weight Shift Standing Fair   Comments w/ FWW   Bed Mobility    Supine to Sit Supervised   Sit to Supine Supervised   Scooting Supervised   Rolling Supervised   ADL Assessment   Grooming Supervision;Standing   Upper Body Dressing Supervision   Lower Body Dressing Supervision   Toileting Supervision   How much help from another person does the patient currently need...   Putting on and taking off regular lower body clothing? 3   Bathing (including washing, rinsing, and drying)? 3   Toileting, which includes using a toilet, bedpan, or urinal? 3   Putting on and taking off regular upper body clothing? 4   Taking care of personal grooming such as brushing teeth? 4   Eating meals? 4   6 Clicks Daily Activity Score 21   Functional Mobility   Sit to Stand Supervised   Bed, Chair, Wheelchair Transfer Supervised   Toilet Transfers Supervised   Transfer Method Stand Step   Mobility bed mobility, hallway, bathroom mobility, back to supine   Comments w/ FWW   Activity Tolerance   Sitting Edge of Bed 5 min   Standing 10 min   Education Group   Education Provided Role of Occupational Therapist   Role of Occupational Therapist  Patient Response Patient;Family;Acceptance;Explanation   Problem List   Problem List None   Anticipated Discharge Equipment and Recommendations   DC Equipment Recommendations None   Discharge Recommendations Recommend home health for continued occupational therapy services   Interdisciplinary Plan of Care Collaboration   IDT Collaboration with  Nursing;Physical Therapist;Family / Caregiver   Patient Position at End of Therapy In Bed;Call Light within Reach;Tray Table within Reach;Phone within Reach;Family / Friend in Room   Collaboration Comments RN updated

## 2022-09-10 NOTE — ED PROVIDER NOTES
ED Provider Note    CHIEF COMPLAINT  Chief Complaint   Patient presents with    Headache     X1 week, intermittent 9-10/10 pain. She states it has been causing her to become dizzy, unbalanced, and fatigued. This pain interrupts her ability to perform ADLs at home    Chest Pain     Right sided, dull 6/10 shoulder pain radiating to her right ear and jaw since today.       HPI  Pam Hernandez is a 77 y.o. female who presents for right anterior chest pain radiating to the neck onset approximately 2 hours ago.  Severity moderate.  No associated sweats nausea or dyspnea.  She has history of hypertension diabetes dyslipidemia and family history of CAD but no tobacco use.  Per chart review her last stress test was in 2016.  She is also had headaches for the last week and unsteadiness of gait.  Denies focal weakness or numbness.  She has had a history recently of syncope, latest episode 2 months ago, and she has a planned follow-up with cardiology to determine whether she needs a pacemaker.    REVIEW OF SYSTEMS  Pertinent positives include: Chest pain, headache, unsteady gait.  Pertinent negatives include: Fever, cough, leg swelling, calf pain, abdominal pain, vomiting, diarrhea.  10+ systems reviewed and negative.      PAST MEDICAL HISTORY  Past Medical History:   Diagnosis Date    Acid reflux     Acute nasopharyngitis [id number]    Anemia     Anesthesia     reports wants sleep, hard to wake up    Arthritis     fingers/hands    At risk for falling     Cataract     removed Dr.Stanko benjamín    Dental disorder     partial lower, plate upper    Diabetes     oral meds and insulin dependent    Elevated lipase 1/23/2019    Glaucoma     bilat    Hyperkalemia 1/23/2019    Hypertension     Hypothyroid 12/10/2018    on no meds at this time    Neuropathy (HCC)     Pneumonia     2015    Psychiatric problem     depression    Snoring     no sleep study    Urinary frequency     Urinary incontinence     wears a pad         SOCIAL  HISTORY  Social History     Tobacco Use    Smoking status: Never    Smokeless tobacco: Never   Vaping Use    Vaping Use: Never used   Substance Use Topics    Alcohol use: No    Drug use: No     Social History     Substance and Sexual Activity   Drug Use No       SURGICAL HISTORY  Past Surgical History:   Procedure Laterality Date    MADELINE BY LAPAROSCOPY N/A 10/18/2019    Procedure: CHOLECYSTECTOMY, LAPAROSCOPIC;  Surgeon: Kathleen Pierson M.D.;  Location: SURGERY SAME DAY [facility];  Service: General    VITRECTOMY POSTERIOR Right 12/11/2018    Procedure: VITRECTOMY POSTERIOR-  LASER;  Surgeon: Katina Bolaños M.D.;  Location: SURGERY SAME DAY [facility];  Service: Ophthalmology    EYE SURGERY Left 2018    for glaucoma    CATARACT EXTRACTION WITH IOL Bilateral     US-NEEDLE CORE BX-BREAST PANEL         CURRENT MEDICATIONS  Current Facility-Administered Medications   Medication Dose Route Frequency Provider Last Rate Last Admin    labetalol (NORMODYNE/TRANDATE) injection 10 mg  10 mg Intravenous Q4HRS PRN Lyndon Zaragoza M.D.        [START ON 9/10/2022] ferrous sulfate tablet 162.5 mg  162.5 mg Oral DAILY Lyndon Zaragoza M.D.        [START ON 9/10/2022] furosemide (LASIX) tablet 60 mg  60 mg Oral DAILY Lyndon Zaragoza M.D.        gabapentin (NEURONTIN) capsule 300 mg  300 mg Oral BID Lyndon Zaragoza M.D.        [START ON 9/10/2022] hydroCHLOROthiazide (HYDRODIURIL) tablet 12.5 mg  12.5 mg Oral DAILY Lyndon Zaragoza M.D.        NS infusion   Intravenous Continuous Lyndon Zaragoza M.D.        heparin injection 5,000 Units  5,000 Units Subcutaneous Q8HRS Lyndon Zaragoza M.D.        acetaminophen (Tylenol) tablet 650 mg  650 mg Oral Q6HRS PRN Lyndon Zaragoza M.D.         Current Outpatient Medications   Medication Sig Dispense Refill    Carboxymethylcellulose Sod PF 0.5 % Solution Administer 1 Drop into both eyes every 2 hours while awake.      Insulin Degludec (TRESIBA FLEXTOUCH) 200  "UNIT/ML Solution Pen-injector Inject 11 Units under the skin every day.      Empagliflozin 10 MG Tab Take 10 mg by mouth every day.      furosemide (LASIX) 20 MG Tab Take 60 mg by mouth every day. 60 mg = 3 tablets      lisinopril (PRINIVIL) 2.5 MG Tab Take 2.5 mg by mouth every day.      pioglitazone (ACTOS) 30 MG Tab Take 30 mg by mouth every day.      potassium chloride ER (KLOR-CON) 10 MEQ tablet Take 10 mEq by mouth 2 times a day.      Brimonidine Tartrate-Timolol 0.2-0.5 % Solution Administer 1 Drop into both eyes 2 times a day.      hydroCHLOROthiazide (HYDRODIURIL) 12.5 MG tablet Take 12.5 mg by mouth every day.      ferrous sulfate 325 (65 Fe) MG tablet Take 162.5 mg by mouth every day. 162.5 mg = 1/2 tablet      gabapentin (NEURONTIN) 100 MG Cap Take 300 mg by mouth 2 times a day.      Cholecalciferol (VITAMIN D) 2000 UNIT TABS Take 4,000 Units by mouth every day. 4000 units = 2 tablets      latanoprost (XALATAN) 0.005 % Solution Place 1 Drop in both eyes every evening.         ALLERGIES  Allergies   Allergen Reactions    Novocain Anaphylaxis       PHYSICAL EXAM  VITAL SIGNS: BP (!) 197/84   Pulse (!) 58   Temp 36.7 °C (98 °F) (Temporal)   Resp 16   Ht 1.575 m (5' 2\")   Wt 73.5 kg (162 lb)   LMP  (LMP Unknown)   SpO2 94%   BMI 29.63 kg/m²   Reviewed and hypertensive, bradycardic, afebrile  Constitutional: Well developed, Well nourished, well-appearing.  HENT: Normocephalic, atraumatic, bilateral external ears normal, Wearing mask.   Eyes: PERRLA, conjunctiva pink, no scleral icterus.   Cardiovascular: Regular S1-S2 without murmur, rub, gallop.  No dependent edema or calf tenderness.  Respiratory: No rales, rhonchi, wheeze or cough.  Gastrointestinal: Soft, nontender, nondistended, no organomegaly.  Skin: No erythema, no rash.   Genitourinary:  No costovertebral angle tenderness.   Neurologic: Alert & oriented x 3, cranial nerves 2-12 intact grasp, biceps, extensor houses longus and ankle " plantarflexion symmetric.  No focal deficit noted.  Psychiatric: Affect normal, Judgment normal, Mood normal.     DIFFERENTIAL DIAGNOSIS:  Subarachnoid hemorrhage, brain mass, myocardial ischemia, pneumonia, pneumothorax, doubt PE, doubt aortic dissection.    EKG  EKG Interpretation 7:43 PM    Interpreted by me.  Indication: Chest pain    Rhythm: normal sinus   Rate: Bradycardic at 54  Axis: normal  Ectopy: none  Conduction: normal  ST/T Waves: no acute change  Q Waves: none  R Wave progression: normal      Clinical Impression: Sinus bradycardia    RADIOLOGY/PROCEDURES  CT-HEAD W/O   Final Result         1. No acute intracranial abnormality. No evidence of acute intracranial hemorrhage or mass lesion.                     DX-CHEST-PORTABLE (1 VIEW)   Final Result      No acute cardiac or pulmonary abnormalities are identified. Lung volumes are low.          LABORATORY:  Results for orders placed or performed during the hospital encounter of 09/09/22   CBC with Differential   Result Value Ref Range    WBC 5.0 4.8 - 10.8 K/uL    RBC 4.44 4.20 - 5.40 M/uL    Hemoglobin 14.1 12.0 - 16.0 g/dL    Hematocrit 40.4 37.0 - 47.0 %    MCV 91.0 81.4 - 97.8 fL    MCH 31.8 27.0 - 33.0 pg    MCHC 34.9 33.6 - 35.0 g/dL    RDW 44.1 35.9 - 50.0 fL    Platelet Count 180 164 - 446 K/uL    MPV 10.8 9.0 - 12.9 fL    Neutrophils-Polys 60.40 44.00 - 72.00 %    Lymphocytes 28.70 22.00 - 41.00 %    Monocytes 7.90 0.00 - 13.40 %    Eosinophils 1.80 0.00 - 6.90 %    Basophils 0.80 0.00 - 1.80 %    Immature Granulocytes 0.40 0.00 - 0.90 %    Nucleated RBC 0.00 /100 WBC    Neutrophils (Absolute) 2.99 2.00 - 7.15 K/uL    Lymphs (Absolute) 1.42 1.00 - 4.80 K/uL    Monos (Absolute) 0.39 0.00 - 0.85 K/uL    Eos (Absolute) 0.09 0.00 - 0.51 K/uL    Baso (Absolute) 0.04 0.00 - 0.12 K/uL    Immature Granulocytes (abs) 0.02 0.00 - 0.11 K/uL    NRBC (Absolute) 0.00 K/uL   Complete Metabolic Panel (CMP)   Result Value Ref Range    Sodium 134 (L) 135 - 145  mmol/L    Potassium 4.3 3.6 - 5.5 mmol/L    Chloride 100 96 - 112 mmol/L    Co2 21 20 - 33 mmol/L    Anion Gap 13.0 7.0 - 16.0    Glucose 209 (H) 65 - 99 mg/dL    Bun 44 (H) 8 - 22 mg/dL    Creatinine 2.43 (H) 0.50 - 1.40 mg/dL    Calcium 9.4 8.5 - 10.5 mg/dL    AST(SGOT) 31 12 - 45 U/L    ALT(SGPT) 16 2 - 50 U/L    Alkaline Phosphatase 122 (H) 30 - 99 U/L    Total Bilirubin 0.3 0.1 - 1.5 mg/dL    Albumin 4.2 3.2 - 4.9 g/dL    Total Protein 7.5 6.0 - 8.2 g/dL    Globulin 3.3 1.9 - 3.5 g/dL    A-G Ratio 1.3 g/dL   Troponin   Result Value Ref Range    Troponin T 24 (H) 6 - 19 ng/L   ESTIMATED GFR   Result Value Ref Range    GFR (CKD-EPI) 20 (A) >60 mL/min/1.73 m 2   TROPONIN   Result Value Ref Range    Troponin T 27 (H) 6 - 19 ng/L   URINALYSIS    Specimen: Urine   Result Value Ref Range    Color Yellow     Character Clear     Specific Gravity 1.007 <1.035    Ph 6.5 5.0 - 8.0    Glucose 250 (A) Negative mg/dL    Ketones Negative Negative mg/dL    Protein Negative Negative mg/dL    Bilirubin Negative Negative    Urobilinogen, Urine 0.2 Negative    Nitrite Positive (A) Negative    Leukocyte Esterase Small (A) Negative    Occult Blood Negative Negative    Micro Urine Req Microscopic    URINE MICROSCOPIC (W/UA)   Result Value Ref Range    WBC 10-20 (A) /hpf    RBC 0-2 /hpf    Bacteria Many (A) None /hpf    Epithelial Cells Negative /hpf    Hyaline Cast 0-2 /lpf       INTERVENTIONS:  Patient declined analgesic for headache.  Patient was ambulated and had a short gait but no unsteadiness or ataxia    COURSE & MEDICAL DECISION MAKING  This patient presents with chest pain headache and unsteady gait.  Her heart score is 4 and her latest restratification test was 6 years ago.  She will require admission for observation on telemetry, serial troponins and consideration of repeat stress test.  She also has headache which may be due to neck pain to be a tension headache.  She does not have migraine features.  There is no evidence  of subdural brain mass or subarachnoid hemorrhage.  Case discussed with the hospitalist who will admit the patient with the plan as above.  She may also benefit from physical therapy for gait.    PLAN:  As above    CONDITION: Fair.    FINAL IMPRESSION  1. Chest pain, unspecified type    2. Unsteadiness    3. Frequent headaches          Electronically signed by: Wes Myers M.D., 9/9/2022 7:41 PM

## 2022-09-10 NOTE — PROGRESS NOTES
"Daily Progress Note:     Date of Service: 9/10/2022  Primary Team: [facility] IM Gray Team   Attending: Sergio Rodriguez M.D.   Senior Resident: Dr. Johnson  Intern: Dr. Archer  Contact:  [phone]    Chief Complaint:  Dizziness    ID:  Ms. Hernandez is a 76 yo F with a PMHx of GERD DM2, hypertension, hypothyroidism, and sinus bradycardia pending outpatient cardiology evaluation by outpatient cardiology who presents for a one-week h/o fatiuge, dizziness, and generalized weakness and presents on 9/9/2022 per recommendation of  nurse for further evaluation.  Patient admitted for near syncopal work up.    Subjective:  C/o feeling \"wobbly\" and dizzy upon standing.  Denies any HA, ear pressure, tinnitus.  States that she noticed her dizziness PTA ED yesterday after going outside to do some yard work.    Interval History:  Sinus bradycardia on telemetry.  D/florinda labetalol due to sinus bradycardia.  Only one dose was given in ED. PRN hydralazine will be used instead.  Finding of positive Romberg in concerning for possible vestibular process.  Though, CT head was negative. MRI is pending for further evaluation.  ECHO report pending to look for possible cardiac etiology.  Patient was noted to be orthostatic negative.  Upon ambulation, patient was very imbalanced and along with positive Romberg substantiates further evaluation for possible vestibular stroke.  Patient denied any personal or family history of menieres.  Highly suspicious of possible medication adverse effect leading to worsening of dehydration from working outside under the sun.  For this reason, HCTZ was held.  Prognosis though poor will require further work up to be fully characterized.    Consultants/Specialty:      Review of Systems:    Review of Systems   Constitutional:  Positive for malaise/fatigue. Negative for chills and fever.   HENT:  Negative for ear pain, hearing loss, sinus pain, sore throat and tinnitus.    Eyes:  Negative for blurred vision and double " vision.   Respiratory:  Negative for cough, hemoptysis and shortness of breath.    Cardiovascular:  Negative for chest pain, palpitations and leg swelling.   Gastrointestinal:  Negative for abdominal pain, heartburn, nausea and vomiting.   Genitourinary:  Negative for dysuria, frequency and urgency.   Musculoskeletal:  Negative for joint pain and myalgias.   Skin:  Negative for itching and rash.   Neurological:  Positive for dizziness and weakness (Generalized). Negative for tingling, sensory change and headaches.   Endo/Heme/Allergies:  Negative for environmental allergies. Does not bruise/bleed easily.   Psychiatric/Behavioral:  Negative for depression and suicidal ideas.      Objective Data:   Physical Exam:   Vitals:   Temp:  [36.3 °C (97.3 °F)-36.6 °C (97.9 °F)] 36.5 °C (97.7 °F)  Pulse:  [52-70] 70  Resp:  [16-18] 16  BP: (136-205)/(57-84) 136/62  SpO2:  [94 %-99 %] 99 %    Physical Exam  Vitals and nursing note reviewed.   Constitutional:       General: She is not in acute distress.     Appearance: Normal appearance. She is not ill-appearing or toxic-appearing.   HENT:      Head: Normocephalic and atraumatic.   Eyes:      General: No scleral icterus.        Right eye: No discharge.         Left eye: No discharge.      Extraocular Movements: Extraocular movements intact.      Pupils: Pupils are equal, round, and reactive to light.   Cardiovascular:      Rate and Rhythm: Bradycardia present.      Heart sounds: No murmur heard.    No friction rub. No gallop.   Pulmonary:      Effort: No respiratory distress.      Breath sounds: No wheezing, rhonchi or rales.   Abdominal:      General: Abdomen is flat. There is no distension.      Palpations: Abdomen is soft.      Tenderness: There is no abdominal tenderness.   Musculoskeletal:         General: No swelling or tenderness. Normal range of motion.      Cervical back: Normal range of motion. No rigidity.   Skin:     General: Skin is warm and dry.      Capillary  Refill: Capillary refill takes less than 2 seconds.      Coloration: Skin is not jaundiced.   Neurological:      Mental Status: She is alert and oriented to person, place, and time. Mental status is at baseline.      Comments: Cranial nerves 2-12 are grossly intact.  Sensation.  Postive Romberg, but no dysdochokinesis noted.  No pronator drift.  Negative HINTs test. Negative karina-hallpike.  Gait abnormal.  Patient appears imbalanced when walking.  Orthostatic negative both for BP and HR.  No vertical or horizontal nystagmus noted.    Psychiatric:         Mood and Affect: Mood normal.         Behavior: Behavior normal.         Labs:   Recent Results (from the past 24 hour(s))   POCT glucose device results    Collection Time: 09/09/22  8:34 PM   Result Value Ref Range    POC Glucose, Blood 78 65 - 99 mg/dL   Basic Metabolic Panel    Collection Time: 09/10/22 12:50 AM   Result Value Ref Range    Sodium 136 135 - 145 mmol/L    Potassium 4.0 3.6 - 5.5 mmol/L    Chloride 104 96 - 112 mmol/L    Co2 20 20 - 33 mmol/L    Glucose 141 (H) 65 - 99 mg/dL    Bun 40 (H) 8 - 22 mg/dL    Creatinine 2.17 (H) 0.50 - 1.40 mg/dL    Calcium 9.0 8.5 - 10.5 mg/dL    Anion Gap 12.0 7.0 - 16.0   ESTIMATED GFR    Collection Time: 09/10/22 12:50 AM   Result Value Ref Range    GFR (CKD-EPI) 23 (A) >60 mL/min/1.73 m 2   PHOSPHORUS    Collection Time: 09/10/22 12:50 AM   Result Value Ref Range    Phosphorus 4.5 2.5 - 4.5 mg/dL   MAGNESIUM    Collection Time: 09/10/22 12:50 AM   Result Value Ref Range    Magnesium 2.2 1.5 - 2.5 mg/dL   Sed Rate    Collection Time: 09/10/22 12:50 AM   Result Value Ref Range    Sed Rate Westergren 19 0 - 25 mm/hour   POCT glucose device results    Collection Time: 09/10/22  6:04 AM   Result Value Ref Range    POC Glucose, Blood 82 65 - 99 mg/dL   POCT glucose device results    Collection Time: 09/10/22  9:32 AM   Result Value Ref Range    POC Glucose, Blood 73 65 - 99 mg/dL   CORTISOL    Collection Time: 09/10/22   9:51 AM   Result Value Ref Range    Cortisol 11.0 0.0 - 23.0 ug/dL   TSH    Collection Time: 09/10/22  9:51 AM   Result Value Ref Range    TSH 4.080 0.380 - 5.330 uIU/mL   POCT glucose device results    Collection Time: 09/10/22 11:05 AM   Result Value Ref Range    POC Glucose, Blood 227 (H) 65 - 99 mg/dL   POCT glucose device results    Collection Time: 09/10/22  1:39 PM   Result Value Ref Range    POC Glucose, Blood 225 (H) 65 - 99 mg/dL       Imaging:   Independant Imaging Review: Completed  EC-ECHOCARDIOGRAM COMPLETE W/O CONT         CT-HEAD W/O   Final Result         1. No acute intracranial abnormality. No evidence of acute intracranial hemorrhage or mass lesion.                     DX-CHEST-PORTABLE (1 VIEW)   Final Result      No acute cardiac or pulmonary abnormalities are identified. Lung volumes are low.      MR-BRAIN-W/O    (Results Pending)       Problem Representation:    Ms. Hernandez is a 76 yo F with a PMHx of GERD DM2, hypertension, hypothyroidism, and sinus bradycardia pending outpatient cardiology evaluation by outpatient cardiology who presents for a one-week h/o fatiuge, dizziness, and generalized weakness and presents on 9/9/2022 per recommendation of  nurse for further evaluation.  Patient admitted for near syncopal work up.    * Dizziness- (present on admission)  Assessment & Plan  *c/o lightheadness noted upon standing while doing yard work outside.  Patient complained of wobbliness.  Positive Romberg on exam. Dehydration vs adverse effect due to medication vs sinus bradycardia vs disruption in vestibular sytem.    - enourage good oral intake  - UA unremarkable  - CXR unremarkable  - VSS overnight with exception of HR  - MRI wo contrsat  - orthostatic negative  - d/florinda home lasix because it was scheduled and pt takes it PRN at home.   - MRI pending  - ECHO performed. Pending report.      Sinus bradycardia  Assessment & Plan  *Noted to be in SB both on EKG and tele in 30s.  Currently scheduled  for appt with cardiology for pacemaker evaluation on 9/20.  Pt's symptoms though can overlap with symptomatic sinus bradycardia is less likely than other etiologies.  - telemetry  - likely can be worked up on outpatient basis  - d/florinda labetalol    Diastolic dysfunction  Assessment & Plan  *Noticed on previous ECHO.  Likely due to long-standing hypertension.  - continue lisinopril  - hold HCTZ      DM (diabetes mellitus) (HCC)  Assessment & Plan  *given 11 units of home DM in AM.  Concern for risk of hypoglyemia. a1c 8.6.  - reduced to 7 units which will be held until tomorrow morning and rosemary continue QAM provided that blood glucose is maintained between 140-180  - SSI  - home premeal lispro  - hypoglycemia protocol    Hypertension  Assessment & Plan  *HCTZ is a part of home meds and diuretics.  Concern for adverse effect leading to admission.  - hold HCTZ  - will continue lisinopril and uptitrate in AM on 9/11 based on vitals  - PRN hydralazine with BP parameters    Chronic kidney disease  Assessment & Plan  *BUN 40. CR 2.4.  renally dose medications       Urinary incontinence  Assessment & Plan  *likely urge incontinence given gender and age. Will do further chart review.  - scheduled bladder scans Q6H      Lines: pIV  Diet: Cardiac  DVT prophylaxis: Heparin  GI prophylaxis: BP  Code Status: DNAR/I OK  Disposition: Inpatient

## 2022-09-10 NOTE — PROGRESS NOTES
1120 Pt had near syncopal episode when returning from bathroom to bed. Pt assisted in seated position to bed. /77 HR 64. Pt bedalarm on and pt educated to call for assistance when needing to get up out of bed. MD Archer notified of event, no new orders placed.

## 2022-09-10 NOTE — ASSESSMENT & PLAN NOTE
Noted to have PRAMOD in setting of uncontrolled blood pressure and taking ACE inhibitor at home. Lisinopril held  Optimize blood pressure  Serial BMP

## 2022-09-10 NOTE — ASSESSMENT & PLAN NOTE
*HCTZ is a part of home meds and diuretics.  Concern for adverse effect leading to admission.  - hold HCTZ  - will continue lisinopril and uptitrate in AM on 9/11 based on vitals  - PRN hydralazine with BP parameters

## 2022-09-10 NOTE — ASSESSMENT & PLAN NOTE
*c/o lightheadness noted upon standing while doing yard work outside.  Patient complained of wobbliness.  Positive Romberg on exam. Dehydration vs adverse effect due to medication vs sinus bradycardia vs disruption in vestibular sytem.  orthostatic negative.  ECHO showed only Grade I diastolic dysfunction. No worsening from previous.    - enourage good oral intake  - MRI wo contrast performed. Pending report.  - d/florinda home lasix because it was scheduled and pt takes it PRN at home.

## 2022-09-10 NOTE — CARE PLAN
The patient is Stable - Low risk of patient condition declining or worsening    Shift Goals  Clinical Goals: BP control  Patient Goals: Sleep    Progress made toward(s) clinical / shift goals:      Problem: Pain - Standard  Goal: Alleviation of pain or a reduction in pain to the patient’s comfort goal  Outcome: Progressing  Note: Upon admission to floor, pt denies chest/jaw/shoulder pain.      Problem: Knowledge Deficit - Standard  Goal: Patient and family/care givers will demonstrate understanding of plan of care, disease process/condition, diagnostic tests and medications  Outcome: Progressing  Note: Oriented pt to room and unit, discussed POC for night and HTN medications.        Patient is not progressing towards the following goals:

## 2022-09-10 NOTE — PROGRESS NOTES
4 Eyes Skin Assessment Completed by LATOYA Alegria and EL Cisneros.    Head WDL  Ears WDL  Nose WDL  Mouth WDL  Neck WDL  Breast/Chest WDL  Shoulder Blades WDL  Spine WDL  (R) Arm/Elbow/Hand Bruising  (L) Arm/Elbow/Hand Bruising  Abdomen WDL  Groin WDL  Scrotum/Coccyx/Buttocks WDL  (R) Leg WDL  (L) Leg WDL  (R) Heel/Foot/Toe Bruising, small bruise to heel  (L) Heel/Foot/Toe WDL          Devices In Places Tele Box, Blood Pressure Cuff, and Pulse Ox      Interventions In Place Pillows and Pressure Redistribution Mattress    Possible Skin Injury No    Pictures Uploaded Into Epic Yes  Wound Consult Placed N/A  RN Wound Prevention Protocol Ordered No

## 2022-09-11 ENCOUNTER — APPOINTMENT (OUTPATIENT)
Dept: RADIOLOGY | Facility: MEDICAL CENTER | Age: 77
DRG: 065 | End: 2022-09-11
Payer: MEDICARE

## 2022-09-11 ENCOUNTER — HOME HEALTH ADMISSION (OUTPATIENT)
Dept: HOME HEALTH SERVICES | Facility: HOME HEALTHCARE | Age: 77
End: 2022-09-11
Payer: MEDICARE

## 2022-09-11 DIAGNOSIS — I63.9 CEREBELLAR INFARCT (HCC): ICD-10-CM

## 2022-09-11 PROBLEM — E53.8 LOW SERUM VITAMIN B12: Status: ACTIVE | Noted: 2022-09-11

## 2022-09-11 LAB
ALBUMIN SERPL BCP-MCNC: 3.7 G/DL (ref 3.2–4.9)
ALBUMIN/GLOB SERPL: 1.2 G/DL
ALP SERPL-CCNC: 91 U/L (ref 30–99)
ALT SERPL-CCNC: 17 U/L (ref 2–50)
ANION GAP SERPL CALC-SCNC: 10 MMOL/L (ref 7–16)
AST SERPL-CCNC: 26 U/L (ref 12–45)
BASOPHILS # BLD AUTO: 0.7 % (ref 0–1.8)
BASOPHILS # BLD: 0.04 K/UL (ref 0–0.12)
BILIRUB SERPL-MCNC: 0.3 MG/DL (ref 0.1–1.5)
BUN SERPL-MCNC: 38 MG/DL (ref 8–22)
CALCIUM SERPL-MCNC: 8.9 MG/DL (ref 8.5–10.5)
CHLORIDE SERPL-SCNC: 99 MMOL/L (ref 96–112)
CHOLEST SERPL-MCNC: 176 MG/DL (ref 100–199)
CO2 SERPL-SCNC: 25 MMOL/L (ref 20–33)
CREAT SERPL-MCNC: 2.17 MG/DL (ref 0.5–1.4)
EOSINOPHIL # BLD AUTO: 0.07 K/UL (ref 0–0.51)
EOSINOPHIL NFR BLD: 1.3 % (ref 0–6.9)
ERYTHROCYTE [DISTWIDTH] IN BLOOD BY AUTOMATED COUNT: 45 FL (ref 35.9–50)
GFR SERPLBLD CREATININE-BSD FMLA CKD-EPI: 23 ML/MIN/1.73 M 2
GLOBULIN SER CALC-MCNC: 3 G/DL (ref 1.9–3.5)
GLUCOSE BLD STRIP.AUTO-MCNC: 178 MG/DL (ref 65–99)
GLUCOSE BLD STRIP.AUTO-MCNC: 260 MG/DL (ref 65–99)
GLUCOSE BLD STRIP.AUTO-MCNC: 94 MG/DL (ref 65–99)
GLUCOSE SERPL-MCNC: 102 MG/DL (ref 65–99)
HCT VFR BLD AUTO: 38.7 % (ref 37–47)
HDLC SERPL-MCNC: 41 MG/DL
HGB BLD-MCNC: 13.3 G/DL (ref 12–16)
IMM GRANULOCYTES # BLD AUTO: 0.02 K/UL (ref 0–0.11)
IMM GRANULOCYTES NFR BLD AUTO: 0.4 % (ref 0–0.9)
LDLC SERPL CALC-MCNC: 112 MG/DL
LYMPHOCYTES # BLD AUTO: 0.99 K/UL (ref 1–4.8)
LYMPHOCYTES NFR BLD: 18.2 % (ref 22–41)
MAGNESIUM SERPL-MCNC: 1.8 MG/DL (ref 1.5–2.5)
MCH RBC QN AUTO: 31.1 PG (ref 27–33)
MCHC RBC AUTO-ENTMCNC: 34.4 G/DL (ref 33.6–35)
MCV RBC AUTO: 90.4 FL (ref 81.4–97.8)
MONOCYTES # BLD AUTO: 0.46 K/UL (ref 0–0.85)
MONOCYTES NFR BLD AUTO: 8.5 % (ref 0–13.4)
NEUTROPHILS # BLD AUTO: 3.86 K/UL (ref 2–7.15)
NEUTROPHILS NFR BLD: 70.9 % (ref 44–72)
NRBC # BLD AUTO: 0 K/UL
NRBC BLD-RTO: 0 /100 WBC
PHOSPHATE SERPL-MCNC: 5 MG/DL (ref 2.5–4.5)
PLATELET # BLD AUTO: 156 K/UL (ref 164–446)
PMV BLD AUTO: 10.3 FL (ref 9–12.9)
POTASSIUM SERPL-SCNC: 3.7 MMOL/L (ref 3.6–5.5)
PROT SERPL-MCNC: 6.7 G/DL (ref 6–8.2)
RBC # BLD AUTO: 4.28 M/UL (ref 4.2–5.4)
SODIUM SERPL-SCNC: 134 MMOL/L (ref 135–145)
TRIGL SERPL-MCNC: 113 MG/DL (ref 0–149)
VIT B12 SERPL-MCNC: 243 PG/ML (ref 211–911)
WBC # BLD AUTO: 5.4 K/UL (ref 4.8–10.8)

## 2022-09-11 PROCEDURE — 700102 HCHG RX REV CODE 250 W/ 637 OVERRIDE(OP)

## 2022-09-11 PROCEDURE — 700102 HCHG RX REV CODE 250 W/ 637 OVERRIDE(OP): Performed by: STUDENT IN AN ORGANIZED HEALTH CARE EDUCATION/TRAINING PROGRAM

## 2022-09-11 PROCEDURE — 96367 TX/PROPH/DG ADDL SEQ IV INF: CPT

## 2022-09-11 PROCEDURE — 700111 HCHG RX REV CODE 636 W/ 250 OVERRIDE (IP)

## 2022-09-11 PROCEDURE — 84100 ASSAY OF PHOSPHORUS: CPT

## 2022-09-11 PROCEDURE — 85025 COMPLETE CBC W/AUTO DIFF WBC: CPT

## 2022-09-11 PROCEDURE — 70551 MRI BRAIN STEM W/O DYE: CPT

## 2022-09-11 PROCEDURE — 770020 HCHG ROOM/CARE - TELE (206)

## 2022-09-11 PROCEDURE — 82607 VITAMIN B-12: CPT

## 2022-09-11 PROCEDURE — 700111 HCHG RX REV CODE 636 W/ 250 OVERRIDE (IP): Performed by: STUDENT IN AN ORGANIZED HEALTH CARE EDUCATION/TRAINING PROGRAM

## 2022-09-11 PROCEDURE — 82962 GLUCOSE BLOOD TEST: CPT | Mod: 91

## 2022-09-11 PROCEDURE — A9270 NON-COVERED ITEM OR SERVICE: HCPCS

## 2022-09-11 PROCEDURE — 83735 ASSAY OF MAGNESIUM: CPT

## 2022-09-11 PROCEDURE — A9270 NON-COVERED ITEM OR SERVICE: HCPCS | Performed by: STUDENT IN AN ORGANIZED HEALTH CARE EDUCATION/TRAINING PROGRAM

## 2022-09-11 PROCEDURE — 80061 LIPID PANEL: CPT

## 2022-09-11 PROCEDURE — 99232 SBSQ HOSP IP/OBS MODERATE 35: CPT | Mod: GC | Performed by: INTERNAL MEDICINE

## 2022-09-11 PROCEDURE — 36415 COLL VENOUS BLD VENIPUNCTURE: CPT

## 2022-09-11 PROCEDURE — 80053 COMPREHEN METABOLIC PANEL: CPT

## 2022-09-11 RX ORDER — LISINOPRIL 5 MG/1
2.5 TABLET ORAL DAILY
Status: DISCONTINUED | OUTPATIENT
Start: 2022-09-11 | End: 2022-09-11

## 2022-09-11 RX ORDER — ATORVASTATIN CALCIUM 40 MG/1
40 TABLET, FILM COATED ORAL EVERY EVENING
Status: DISCONTINUED | OUTPATIENT
Start: 2022-09-11 | End: 2022-09-12 | Stop reason: HOSPADM

## 2022-09-11 RX ORDER — AMLODIPINE BESYLATE 5 MG/1
5 TABLET ORAL
Status: DISCONTINUED | OUTPATIENT
Start: 2022-09-12 | End: 2022-09-12 | Stop reason: HOSPADM

## 2022-09-11 RX ORDER — ASPIRIN 81 MG/1
81 TABLET, CHEWABLE ORAL DAILY
Status: DISCONTINUED | OUTPATIENT
Start: 2022-09-11 | End: 2022-09-12 | Stop reason: HOSPADM

## 2022-09-11 RX ORDER — AMLODIPINE BESYLATE 5 MG/1
5 TABLET ORAL DAILY
Qty: 30 TABLET | Refills: 0 | OUTPATIENT
Start: 2022-09-12 | End: 2022-10-12

## 2022-09-11 RX ORDER — LISINOPRIL 10 MG/1
10 TABLET ORAL DAILY
Status: DISCONTINUED | OUTPATIENT
Start: 2022-09-12 | End: 2022-09-12 | Stop reason: HOSPADM

## 2022-09-11 RX ORDER — LISINOPRIL 5 MG/1
5 TABLET ORAL ONCE
Status: COMPLETED | OUTPATIENT
Start: 2022-09-11 | End: 2022-09-11

## 2022-09-11 RX ORDER — ATORVASTATIN CALCIUM 40 MG/1
40 TABLET, FILM COATED ORAL EVERY EVENING
Qty: 30 TABLET | Refills: 0 | OUTPATIENT
Start: 2022-09-12 | End: 2022-10-12

## 2022-09-11 RX ORDER — ASPIRIN 81 MG/1
81 TABLET, CHEWABLE ORAL DAILY
Qty: 30 TABLET | Refills: 0 | OUTPATIENT
Start: 2022-09-12 | End: 2022-10-12

## 2022-09-11 RX ORDER — CYANOCOBALAMIN 1000 UG/ML
1000 INJECTION, SOLUTION INTRAMUSCULAR; SUBCUTANEOUS
Qty: 8 ML | Refills: 0 | OUTPATIENT
Start: 2022-09-12 | End: 2022-09-26

## 2022-09-11 RX ORDER — ASPIRIN 81 MG/1
81 TABLET, CHEWABLE ORAL DAILY
Qty: 30 TABLET | Refills: 0 | OUTPATIENT
Start: 2022-09-11 | End: 2022-10-11

## 2022-09-11 RX ORDER — MAGNESIUM SULFATE HEPTAHYDRATE 40 MG/ML
2 INJECTION, SOLUTION INTRAVENOUS ONCE
Status: COMPLETED | OUTPATIENT
Start: 2022-09-11 | End: 2022-09-11

## 2022-09-11 RX ORDER — CYANOCOBALAMIN 1000 UG/ML
1000 INJECTION, SOLUTION INTRAMUSCULAR; SUBCUTANEOUS
Status: DISCONTINUED | OUTPATIENT
Start: 2022-09-12 | End: 2022-09-12 | Stop reason: HOSPADM

## 2022-09-11 RX ORDER — LISINOPRIL 10 MG/1
10 TABLET ORAL DAILY
Qty: 30 TABLET | OUTPATIENT
Start: 2022-09-11

## 2022-09-11 RX ORDER — POTASSIUM CHLORIDE 20 MEQ/1
20 TABLET, EXTENDED RELEASE ORAL ONCE
Status: COMPLETED | OUTPATIENT
Start: 2022-09-11 | End: 2022-09-11

## 2022-09-11 RX ORDER — LISINOPRIL 5 MG/1
2.5 TABLET ORAL ONCE
Status: COMPLETED | OUTPATIENT
Start: 2022-09-11 | End: 2022-09-11

## 2022-09-11 RX ORDER — AMLODIPINE BESYLATE 5 MG/1
5 TABLET ORAL DAILY
Qty: 30 TABLET | Refills: 0 | OUTPATIENT
Start: 2022-09-11 | End: 2022-10-11

## 2022-09-11 RX ADMIN — LISINOPRIL 2.5 MG: 5 TABLET ORAL at 10:32

## 2022-09-11 RX ADMIN — ATORVASTATIN CALCIUM 40 MG: 40 TABLET, FILM COATED ORAL at 18:43

## 2022-09-11 RX ADMIN — LISINOPRIL 5 MG: 5 TABLET ORAL at 14:41

## 2022-09-11 RX ADMIN — LISINOPRIL 2.5 MG: 5 TABLET ORAL at 05:18

## 2022-09-11 RX ADMIN — FERROUS SULFATE TAB 325 MG (65 MG ELEMENTAL FE) 162.5 MG: 325 (65 FE) TAB at 05:18

## 2022-09-11 RX ADMIN — GABAPENTIN 300 MG: 300 CAPSULE ORAL at 05:18

## 2022-09-11 RX ADMIN — POTASSIUM CHLORIDE 20 MEQ: 1500 TABLET, EXTENDED RELEASE ORAL at 05:18

## 2022-09-11 RX ADMIN — HEPARIN SODIUM 5000 UNITS: 5000 INJECTION, SOLUTION INTRAVENOUS; SUBCUTANEOUS at 14:40

## 2022-09-11 RX ADMIN — GABAPENTIN 300 MG: 300 CAPSULE ORAL at 18:43

## 2022-09-11 RX ADMIN — INSULIN LISPRO 3 UNITS: 100 INJECTION, SOLUTION INTRAVENOUS; SUBCUTANEOUS at 18:42

## 2022-09-11 RX ADMIN — ASPIRIN 81 MG: 81 TABLET, CHEWABLE ORAL at 10:32

## 2022-09-11 RX ADMIN — MAGNESIUM SULFATE HEPTAHYDRATE 2 G: 40 INJECTION, SOLUTION INTRAVENOUS at 05:19

## 2022-09-11 RX ADMIN — INSULIN LISPRO 1 UNITS: 100 INJECTION, SOLUTION INTRAVENOUS; SUBCUTANEOUS at 14:41

## 2022-09-11 RX ADMIN — HEPARIN SODIUM 5000 UNITS: 5000 INJECTION, SOLUTION INTRAVENOUS; SUBCUTANEOUS at 22:22

## 2022-09-11 RX ADMIN — HEPARIN SODIUM 5000 UNITS: 5000 INJECTION, SOLUTION INTRAVENOUS; SUBCUTANEOUS at 05:18

## 2022-09-11 ASSESSMENT — ENCOUNTER SYMPTOMS
DIZZINESS: 1
DOUBLE VISION: 0
VOMITING: 0
NAUSEA: 0
ABDOMINAL PAIN: 0
HEMOPTYSIS: 0
PALPITATIONS: 0
CHILLS: 0
DEPRESSION: 0
BRUISES/BLEEDS EASILY: 0
SORE THROAT: 0
BLURRED VISION: 0
MYALGIAS: 0
COUGH: 0
FEVER: 0
SENSORY CHANGE: 0
SHORTNESS OF BREATH: 0
WEAKNESS: 1
SINUS PAIN: 0
HEARTBURN: 0
TINGLING: 0
HEADACHES: 0

## 2022-09-11 ASSESSMENT — FIBROSIS 4 INDEX: FIB4 SCORE: 3.11

## 2022-09-11 NOTE — FACE TO FACE
Face to Face Supporting Documentation - Home Health    The encounter with this patient was in whole or in part the primary reason for home health admission.    Date of encounter:   Patient:                    MRN:                       YOB: 2022  Pam Hernandez  [id number]  1945     Home health to see patient for:  Skilled Nursing care for assessment, interventions & education and Occupational therapy evaluation and treatment    Skilled need for:  Comment: OT three times per week.  Tracey has home health established. Additionally will need B12 injections at home.    Skilled nursing interventions to include:  Comment: B12 injections    Homebound status evidenced by:  Need the aid of supportive devices such as crutches, canes, wheelchairs or walkers. Leaving home requires a considerable and taxing effort. There is a normal inability to leave the home.    Community Physician to provide follow up care: Brittni Zhou M.D.     Optional Interventions? No      I certify the face to face encounter for this home health care referral meets the CMS requirements and the encounter/clinical assessment with the patient was, in whole, or in part, for the medical condition(s) listed above, which is the primary reason for home health care. Based on my clinical findings: the service(s) are medically necessary, support the need for home health care, and the homebound criteria are met.  I certify that this patient has had a face to face encounter by myself.  Lyndon Archer M.D. - NPI: [id number]

## 2022-09-11 NOTE — PROGRESS NOTES
Daily Progress Note:     Date of Service: 9/11/2022  Primary Team: [facility] Team   Attending: Sergio Rodriguez M.D.   Senior Resident: Dr. Johnson  Intern: Dr. Archer  Contact:  [phone]    Chief Complaint:  Dizziness    ID:  Ms. Hernandez is a 78 yo F with a PMHx of GERD DM2, hypertension, hypothyroidism, and sinus bradycardia pending outpatient cardiology evaluation by outpatient cardiology who presents for a one-week h/o fatiuge, dizziness, and generalized weakness and presents on 9/9/2022 per recommendation of HH nurse for further evaluation.  Patient admitted for near syncopal work up.    Subjective:  No acute overnight events.  Patient states that she feels better today.  Denies any HA.  Discussed with patient's son and grand daughter the need to have zio patch for event monitoring for Afib given MRI findings of chronic infarctions (please refer to MRI).  Patient understood and agreed with treatment plan.    Interval History:  Sinus bradycardia on telemetry.  Low blood glucose below 140.  Held premeal insulin.  MRI showed pontine lacunar infarct as well as chronic infarctions of cerebellum and left inferior medial occipital lobe infarcts.  Started on 81 mg ASA.  Lipid panel consistent with HLD.  Started on high-intensity statin.  B12 noted to be low.  Started on IM B12.  Also placed discharge orders to be administered by HH nurse.  BP elevated.  2.5 mg lisinoprl given in AM.  Additonal 5 mg will be administered in PM provided SBP>140 mmHg.  Strating on 9/12, pt will started 10 mg lisinopril daily. Home lasix will be reduced to 20 mg daily PRN.  Patient has not had any signs of volume overload in patient.  ECHO showed grade I diastolic dysfunction with no worsening.  Plan for zio patch tomorrow.  Will place order.  Notified cardiology APRN.  Decreased home dosage of gabapentin to 300 mg daily to reduce fall risk.    Consultants/Specialty:      Review of Systems:    Review of Systems   Constitutional:  Positive  for malaise/fatigue. Negative for chills and fever.   HENT:  Negative for ear pain, hearing loss, sinus pain, sore throat and tinnitus.    Eyes:  Negative for blurred vision and double vision.   Respiratory:  Negative for cough, hemoptysis and shortness of breath.    Cardiovascular:  Negative for chest pain, palpitations and leg swelling.   Gastrointestinal:  Negative for abdominal pain, heartburn, nausea and vomiting.   Genitourinary:  Negative for dysuria, frequency and urgency.   Musculoskeletal:  Negative for joint pain and myalgias.   Skin:  Negative for itching and rash.   Neurological:  Positive for dizziness and weakness (Generalized). Negative for tingling, sensory change and headaches.   Endo/Heme/Allergies:  Negative for environmental allergies. Does not bruise/bleed easily.   Psychiatric/Behavioral:  Negative for depression and suicidal ideas.      Objective Data:   Physical Exam:   Vitals:   Temp:  [36.1 °C (97 °F)-36.6 °C (97.9 °F)] 36.3 °C (97.3 °F)  Pulse:  [63-84] 84  Resp:  [16] 16  BP: (136-158)/(60-82) 145/72  SpO2:  [92 %-99 %] 97 %    Physical Exam  Vitals and nursing note reviewed.   Constitutional:       General: She is not in acute distress.     Appearance: Normal appearance. She is not ill-appearing or toxic-appearing.   HENT:      Head: Normocephalic and atraumatic.   Eyes:      General: No scleral icterus.        Right eye: No discharge.         Left eye: No discharge.      Extraocular Movements: Extraocular movements intact.      Pupils: Pupils are equal, round, and reactive to light.   Cardiovascular:      Rate and Rhythm: Bradycardia present.      Heart sounds: No murmur heard.    No friction rub. No gallop.   Pulmonary:      Effort: No respiratory distress.      Breath sounds: No wheezing, rhonchi or rales.   Abdominal:      General: Abdomen is flat. There is no distension.      Palpations: Abdomen is soft.      Tenderness: There is no abdominal tenderness.   Musculoskeletal:          General: No swelling or tenderness. Normal range of motion.      Cervical back: Normal range of motion. No rigidity.   Skin:     General: Skin is warm and dry.      Capillary Refill: Capillary refill takes less than 2 seconds.      Coloration: Skin is not jaundiced.   Neurological:      Mental Status: She is alert and oriented to person, place, and time. Mental status is at baseline.      Comments: Cranial nerves 2-12 are grossly intact.  Sensation.  Postive Romberg, but no dysdochokinesis noted.  No pronator drift.  Negative HINTs test. Negative karina-hallpike.  Gait abnormal.  Patient appears imbalanced when walking.  Orthostatic negative both for BP and HR.  No vertical or horizontal nystagmus noted.    Psychiatric:         Mood and Affect: Mood normal.         Behavior: Behavior normal.         Labs:   Recent Results (from the past 24 hour(s))   POCT glucose device results    Collection Time: 09/10/22  9:32 AM   Result Value Ref Range    POC Glucose, Blood 73 65 - 99 mg/dL   CORTISOL    Collection Time: 09/10/22  9:51 AM   Result Value Ref Range    Cortisol 11.0 0.0 - 23.0 ug/dL   TSH    Collection Time: 09/10/22  9:51 AM   Result Value Ref Range    TSH 4.080 0.380 - 5.330 uIU/mL   POCT glucose device results    Collection Time: 09/10/22 11:05 AM   Result Value Ref Range    POC Glucose, Blood 227 (H) 65 - 99 mg/dL   POCT glucose device results    Collection Time: 09/10/22  1:39 PM   Result Value Ref Range    POC Glucose, Blood 225 (H) 65 - 99 mg/dL   EC-ECHOCARDIOGRAM COMPLETE W/O CONT    Collection Time: 09/10/22  4:21 PM   Result Value Ref Range    Eject.Frac. MOD BP 60.77     Eject.Frac. MOD 4C 53.93     Eject.Frac. MOD 2C 65.84    POCT glucose device results    Collection Time: 09/10/22  5:59 PM   Result Value Ref Range    POC Glucose, Blood 126 (H) 65 - 99 mg/dL   POCT glucose device results    Collection Time: 09/10/22 11:12 PM   Result Value Ref Range    POC Glucose, Blood 92 65 - 99 mg/dL   Lipid Profile     Collection Time: 09/11/22  1:11 AM   Result Value Ref Range    Cholesterol,Tot 176 100 - 199 mg/dL    Triglycerides 113 0 - 149 mg/dL    HDL 41 >=40 mg/dL     (H) <100 mg/dL   CBC WITH DIFFERENTIAL    Collection Time: 09/11/22  1:11 AM   Result Value Ref Range    WBC 5.4 4.8 - 10.8 K/uL    RBC 4.28 4.20 - 5.40 M/uL    Hemoglobin 13.3 12.0 - 16.0 g/dL    Hematocrit 38.7 37.0 - 47.0 %    MCV 90.4 81.4 - 97.8 fL    MCH 31.1 27.0 - 33.0 pg    MCHC 34.4 33.6 - 35.0 g/dL    RDW 45.0 35.9 - 50.0 fL    Platelet Count 156 (L) 164 - 446 K/uL    MPV 10.3 9.0 - 12.9 fL    Neutrophils-Polys 70.90 44.00 - 72.00 %    Lymphocytes 18.20 (L) 22.00 - 41.00 %    Monocytes 8.50 0.00 - 13.40 %    Eosinophils 1.30 0.00 - 6.90 %    Basophils 0.70 0.00 - 1.80 %    Immature Granulocytes 0.40 0.00 - 0.90 %    Nucleated RBC 0.00 /100 WBC    Neutrophils (Absolute) 3.86 2.00 - 7.15 K/uL    Lymphs (Absolute) 0.99 (L) 1.00 - 4.80 K/uL    Monos (Absolute) 0.46 0.00 - 0.85 K/uL    Eos (Absolute) 0.07 0.00 - 0.51 K/uL    Baso (Absolute) 0.04 0.00 - 0.12 K/uL    Immature Granulocytes (abs) 0.02 0.00 - 0.11 K/uL    NRBC (Absolute) 0.00 K/uL   Comp Metabolic Panel    Collection Time: 09/11/22  1:11 AM   Result Value Ref Range    Sodium 134 (L) 135 - 145 mmol/L    Potassium 3.7 3.6 - 5.5 mmol/L    Chloride 99 96 - 112 mmol/L    Co2 25 20 - 33 mmol/L    Anion Gap 10.0 7.0 - 16.0    Glucose 102 (H) 65 - 99 mg/dL    Bun 38 (H) 8 - 22 mg/dL    Creatinine 2.17 (H) 0.50 - 1.40 mg/dL    Calcium 8.9 8.5 - 10.5 mg/dL    AST(SGOT) 26 12 - 45 U/L    ALT(SGPT) 17 2 - 50 U/L    Alkaline Phosphatase 91 30 - 99 U/L    Total Bilirubin 0.3 0.1 - 1.5 mg/dL    Albumin 3.7 3.2 - 4.9 g/dL    Total Protein 6.7 6.0 - 8.2 g/dL    Globulin 3.0 1.9 - 3.5 g/dL    A-G Ratio 1.2 g/dL   MAGNESIUM    Collection Time: 09/11/22  1:11 AM   Result Value Ref Range    Magnesium 1.8 1.5 - 2.5 mg/dL   PHOSPHORUS    Collection Time: 09/11/22  1:11 AM   Result Value Ref Range     Phosphorus 5.0 (H) 2.5 - 4.5 mg/dL   VITAMIN B12    Collection Time: 09/11/22  1:11 AM   Result Value Ref Range    Vitamin B12 -True Cobalamin 243 211 - 911 pg/mL   ESTIMATED GFR    Collection Time: 09/11/22  1:11 AM   Result Value Ref Range    GFR (CKD-EPI) 23 (A) >60 mL/min/1.73 m 2       Imaging:   Independant Imaging Review: Completed  EC-ECHOCARDIOGRAM COMPLETE W/O CONT   Final Result      CT-HEAD W/O   Final Result         1. No acute intracranial abnormality. No evidence of acute intracranial hemorrhage or mass lesion.                     DX-CHEST-PORTABLE (1 VIEW)   Final Result      No acute cardiac or pulmonary abnormalities are identified. Lung volumes are low.      MR-BRAIN-W/O    (Results Pending)       Problem Representation:    Ms. Hernandez is a 78 yo F with a PMHx of GERD DM2, hypertension, hypothyroidism, and sinus bradycardia pending outpatient cardiology evaluation by outpatient cardiology who presents for a one-week h/o fatiuge, dizziness, and generalized weakness and presents on 9/9/2022 per recommendation of HH nurse for further evaluation.  Patient admitted for near syncopal work up.    * Dizziness- (present on admission)  Assessment & Plan  *c/o lightheadness noted upon standing while doing yard work outside.  Patient complained of wobbliness.  Positive Romberg on exam. Dehydration vs adverse effect due to medication vs sinus bradycardia vs disruption in vestibular sytem.  ECHO showed no worsening.  MRI showed chronic infarcts in cerebellum and pontine lacunar infarct. No signs concerning of UTI or PNA.  Orthotic negative.  - d/florinda HCTZ  - decreased home PRN dosage lasix. No lasix administered in patient because patient showed no signs of volume overload  - zio patch pending      Sinus bradycardia  Assessment & Plan  *Noted to be in SB both on EKG and tele in 30s.  Currently scheduled for appt with cardiology for pacemaker evaluation on 9/20.  Pt's symptoms though can overlap with symptomatic  sinus bradycardia is less likely than other etiologies.  - telemetry  - likely can be worked up on outpatient basis      Diastolic dysfunction  Assessment & Plan  *Noticed on previous ECHO.  Likely due to long-standing hypertension.  - continue lisinopril and uptitrate accordingly  - d/florinda HCTZ      B12 Deficiency  Assessment & Plan  *B12 level low  - orders placed for 1000 ug B12 IM every other day x 1 month, 1000 ug B12 IM weekly x 1 month, and 1000 ug IM B12 every month forever.  Syringes ordered.  - home health nurse will administer at home    DM (diabetes mellitus) (HCC)  Assessment & Plan  *given 11 units of home DM in AM.  Concern for risk of hypoglyemia. a1c 8.6.  - reduced to 7 units which will be held until tomorrow morning and rosemary continue QAM provided that blood glucose is maintained between 140-180  - SSI  - held home premeal lispro   - held glargine  - hypoglycemia protocol    Hypertension  Assessment & Plan  *HCTZ is a part of home meds and diuretics.  Concern for adverse effect leading to admission.  - d/c HCTZ  - will continue lisinopril and uptitrate in AM on 9/11 based on vitals  - PRN hydralazine with BP parameters    Chronic kidney disease  Assessment & Plan  *BUN 40. CR 2.4.  - renally dose medications     Neuropathy  Assessment & Plan  - decreased home dosage of gabapentin to 300 mg daily because of fall risk.    Lines: pIV  Diet: Cardiac  DVT prophylaxis: Heparin  GI prophylaxis: BP  Code Status: DNAR/I OK  Disposition: Inpatient

## 2022-09-11 NOTE — PROGRESS NOTES
Assumed care of patient, bedside report received from day RN. Updated on POC, call light within reach and fall precautions in place. Bed locked and in lowest position. Patient instructed to call for assistance before getting out of bed. All questions answered, no other needs at this time.

## 2022-09-11 NOTE — DISCHARGE PLANNING
HTH/SCP TCN chart review completed. Collaborated with Jami VILLELA  Per collaboration with CM and chart review, PT/OT recommend home health and PT recommends a FWW.  [facility] has accepted.  Plan for possible discharge home today. DME choice for walker obtained by this TCN, faxed to [facility] and given to CM.  Patient seen at bedside.   TCN will continue to follow and collaborate with discharge planning team as additional post acute needs arise. Thank you.    Previously completed:  - Choice forms: [facility], DME (walker) obtained 9/11/22   - [facility] [place] Sent

## 2022-09-11 NOTE — DISCHARGE PLANNING
Received Choice form at 1209  Agency/Facility Name: [facility]  Referral sent per Choice form @ [phone]

## 2022-09-11 NOTE — DISCHARGE PLANNING
Case Management Discharge Planning    Admission Date: 9/9/2022  GMLOS:    ALOS: 0    6-Clicks ADL Score: 21  6-Clicks Mobility Score: 22      Anticipated Discharge Dispo: Discharge Disposition: Discharged to home/self care (01) (home health)  Discharge Address: [address] [place], [place] [address] [address]  Discharge Contact Phone Number: [phone]    DME Needed: none ordered at this time.    Action(s) Taken: Choice obtained and Referral(s) sent    Escalations Completed: None    Medically Clear: Yes    Next Steps: Met with pt in room. Per pt she has a care provider that comes daily to her house through [facility]. Choice had already been obtained for  (see TCN note 9/10). This RN CM contacted [facility] to fax choice to .     Barriers to Discharge: Outpatient referrals pending    Is the patient up for discharge tomorrow: N/A

## 2022-09-11 NOTE — DISCHARGE PLANNING
Received Choice form at 09/10  Agency/Facility Name: [facility]   Referral sent per Choice form @ [phone]

## 2022-09-11 NOTE — DISCHARGE PLANNING
ATTN: Case Management  RE: Referral for Home Health    As of 9/11/2022, we have accepted the Home Health referral for the patient listed above.    A [facility] clinician will be out to see the patient within 48 hours. If you have any questions or concerns regarding the patient's transition to Home Health, please do not hesitate to contact us at [phone].      We look forward to collaborating with you,  [facility] Team

## 2022-09-11 NOTE — CARE PLAN
The patient is Stable - Low risk of patient condition declining or worsening    Shift Goals  Clinical Goals: Monitor BP, MRI brain  Patient Goals: Rest    Progress made toward(s) clinical / shift goals:      Problem: Pain - Standard  Goal: Alleviation of pain or a reduction in pain to the patient’s comfort goal  Outcome: Progressing     Problem: Knowledge Deficit - Standard  Goal: Patient and family/care givers will demonstrate understanding of plan of care, disease process/condition, diagnostic tests and medications  Outcome: Progressing  Note: Discussed POC with patient and upcoming imaging ordered.        Patient is not progressing towards the following goals:

## 2022-09-11 NOTE — ASSESSMENT & PLAN NOTE
*likely urge incontinence given gender and age. Will do further chart review.  - scheduled bladder scans Q6H

## 2022-09-12 ENCOUNTER — NON-PROVIDER VISIT (OUTPATIENT)
Dept: CARDIOLOGY | Facility: MEDICAL CENTER | Age: 77
End: 2022-09-12
Payer: MEDICARE

## 2022-09-12 ENCOUNTER — TELEPHONE (OUTPATIENT)
Dept: CARDIOLOGY | Facility: MEDICAL CENTER | Age: 77
End: 2022-09-12
Payer: MEDICARE

## 2022-09-12 VITALS
BODY MASS INDEX: 29.41 KG/M2 | SYSTOLIC BLOOD PRESSURE: 133 MMHG | TEMPERATURE: 96.6 F | WEIGHT: 159.83 LBS | HEART RATE: 84 BPM | DIASTOLIC BLOOD PRESSURE: 63 MMHG | HEIGHT: 62 IN | RESPIRATION RATE: 18 BRPM | OXYGEN SATURATION: 92 %

## 2022-09-12 DIAGNOSIS — R00.1 SINUS BRADYCARDIA: Chronic | ICD-10-CM

## 2022-09-12 DIAGNOSIS — I63.9 CEREBROVASCULAR ACCIDENT (CVA), UNSPECIFIED MECHANISM (HCC): Chronic | ICD-10-CM

## 2022-09-12 DIAGNOSIS — I49.3 PVC'S (PREMATURE VENTRICULAR CONTRACTIONS): ICD-10-CM

## 2022-09-12 DIAGNOSIS — I49.1 APC (ATRIAL PREMATURE CONTRACTIONS): ICD-10-CM

## 2022-09-12 LAB
ALBUMIN SERPL BCP-MCNC: 3.5 G/DL (ref 3.2–4.9)
ALBUMIN/GLOB SERPL: 1.3 G/DL
ALP SERPL-CCNC: 105 U/L (ref 30–99)
ALT SERPL-CCNC: 14 U/L (ref 2–50)
ANION GAP SERPL CALC-SCNC: 11 MMOL/L (ref 7–16)
AST SERPL-CCNC: 24 U/L (ref 12–45)
BACTERIA UR CULT: ABNORMAL
BACTERIA UR CULT: ABNORMAL
BASOPHILS # BLD AUTO: 0.5 % (ref 0–1.8)
BASOPHILS # BLD: 0.03 K/UL (ref 0–0.12)
BILIRUB SERPL-MCNC: 0.4 MG/DL (ref 0.1–1.5)
BUN SERPL-MCNC: 38 MG/DL (ref 8–22)
CALCIUM SERPL-MCNC: 9.1 MG/DL (ref 8.5–10.5)
CHLORIDE SERPL-SCNC: 97 MMOL/L (ref 96–112)
CO2 SERPL-SCNC: 25 MMOL/L (ref 20–33)
CREAT SERPL-MCNC: 2.25 MG/DL (ref 0.5–1.4)
EOSINOPHIL # BLD AUTO: 0.13 K/UL (ref 0–0.51)
EOSINOPHIL NFR BLD: 2.4 % (ref 0–6.9)
ERYTHROCYTE [DISTWIDTH] IN BLOOD BY AUTOMATED COUNT: 45.9 FL (ref 35.9–50)
GFR SERPLBLD CREATININE-BSD FMLA CKD-EPI: 22 ML/MIN/1.73 M 2
GLOBULIN SER CALC-MCNC: 2.7 G/DL (ref 1.9–3.5)
GLUCOSE BLD STRIP.AUTO-MCNC: 142 MG/DL (ref 65–99)
GLUCOSE SERPL-MCNC: 204 MG/DL (ref 65–99)
HCT VFR BLD AUTO: 37.9 % (ref 37–47)
HGB BLD-MCNC: 12.6 G/DL (ref 12–16)
IMM GRANULOCYTES # BLD AUTO: 0.02 K/UL (ref 0–0.11)
IMM GRANULOCYTES NFR BLD AUTO: 0.4 % (ref 0–0.9)
LYMPHOCYTES # BLD AUTO: 1.68 K/UL (ref 1–4.8)
LYMPHOCYTES NFR BLD: 30.4 % (ref 22–41)
MAGNESIUM SERPL-MCNC: 2.3 MG/DL (ref 1.5–2.5)
MCH RBC QN AUTO: 30.5 PG (ref 27–33)
MCHC RBC AUTO-ENTMCNC: 33.2 G/DL (ref 33.6–35)
MCV RBC AUTO: 91.8 FL (ref 81.4–97.8)
MONOCYTES # BLD AUTO: 0.6 K/UL (ref 0–0.85)
MONOCYTES NFR BLD AUTO: 10.8 % (ref 0–13.4)
NEUTROPHILS # BLD AUTO: 3.07 K/UL (ref 2–7.15)
NEUTROPHILS NFR BLD: 55.5 % (ref 44–72)
NRBC # BLD AUTO: 0 K/UL
NRBC BLD-RTO: 0 /100 WBC
PHOSPHATE SERPL-MCNC: 3.8 MG/DL (ref 2.5–4.5)
PLATELET # BLD AUTO: 161 K/UL (ref 164–446)
PMV BLD AUTO: 10.4 FL (ref 9–12.9)
POTASSIUM SERPL-SCNC: 4.2 MMOL/L (ref 3.6–5.5)
PROT SERPL-MCNC: 6.2 G/DL (ref 6–8.2)
RBC # BLD AUTO: 4.13 M/UL (ref 4.2–5.4)
SIGNIFICANT IND 70042: ABNORMAL
SITE SITE: ABNORMAL
SODIUM SERPL-SCNC: 133 MMOL/L (ref 135–145)
SOURCE SOURCE: ABNORMAL
WBC # BLD AUTO: 5.5 K/UL (ref 4.8–10.8)

## 2022-09-12 PROCEDURE — 82962 GLUCOSE BLOOD TEST: CPT

## 2022-09-12 PROCEDURE — 700102 HCHG RX REV CODE 250 W/ 637 OVERRIDE(OP): Performed by: STUDENT IN AN ORGANIZED HEALTH CARE EDUCATION/TRAINING PROGRAM

## 2022-09-12 PROCEDURE — 84100 ASSAY OF PHOSPHORUS: CPT

## 2022-09-12 PROCEDURE — 700102 HCHG RX REV CODE 250 W/ 637 OVERRIDE(OP)

## 2022-09-12 PROCEDURE — 83735 ASSAY OF MAGNESIUM: CPT

## 2022-09-12 PROCEDURE — 700111 HCHG RX REV CODE 636 W/ 250 OVERRIDE (IP): Performed by: STUDENT IN AN ORGANIZED HEALTH CARE EDUCATION/TRAINING PROGRAM

## 2022-09-12 PROCEDURE — 36415 COLL VENOUS BLD VENIPUNCTURE: CPT

## 2022-09-12 PROCEDURE — 700111 HCHG RX REV CODE 636 W/ 250 OVERRIDE (IP)

## 2022-09-12 PROCEDURE — 80053 COMPREHEN METABOLIC PANEL: CPT

## 2022-09-12 PROCEDURE — 97116 GAIT TRAINING THERAPY: CPT

## 2022-09-12 PROCEDURE — 99239 HOSP IP/OBS DSCHRG MGMT >30: CPT | Mod: GC | Performed by: INTERNAL MEDICINE

## 2022-09-12 PROCEDURE — A9270 NON-COVERED ITEM OR SERVICE: HCPCS

## 2022-09-12 PROCEDURE — 97530 THERAPEUTIC ACTIVITIES: CPT

## 2022-09-12 PROCEDURE — 85025 COMPLETE CBC W/AUTO DIFF WBC: CPT

## 2022-09-12 PROCEDURE — A9270 NON-COVERED ITEM OR SERVICE: HCPCS | Performed by: STUDENT IN AN ORGANIZED HEALTH CARE EDUCATION/TRAINING PROGRAM

## 2022-09-12 RX ORDER — ASPIRIN 81 MG/1
81 TABLET, CHEWABLE ORAL DAILY
Qty: 30 TABLET | Refills: 0 | Status: SHIPPED | OUTPATIENT
Start: 2022-09-13 | End: 2022-10-13

## 2022-09-12 RX ORDER — CYANOCOBALAMIN 1000 UG/ML
1000 INJECTION, SOLUTION INTRAMUSCULAR; SUBCUTANEOUS
Qty: 8 ML | Refills: 0 | Status: SHIPPED | OUTPATIENT
Start: 2022-09-14 | End: 2022-09-20

## 2022-09-12 RX ORDER — ATORVASTATIN CALCIUM 40 MG/1
40 TABLET, FILM COATED ORAL EVERY EVENING
Qty: 30 TABLET | Refills: 0 | Status: SHIPPED | OUTPATIENT
Start: 2022-09-12 | End: 2022-09-20

## 2022-09-12 RX ORDER — INSULIN GLARGINE 100 [IU]/ML
5 INJECTION, SOLUTION SUBCUTANEOUS EVERY MORNING
Qty: 3 ML | Refills: 0 | Status: SHIPPED | OUTPATIENT
Start: 2022-09-12 | End: 2022-10-12

## 2022-09-12 RX ORDER — LISINOPRIL 10 MG/1
10 TABLET ORAL DAILY
Qty: 30 TABLET | Refills: 0 | Status: SHIPPED | OUTPATIENT
Start: 2022-09-13 | End: 2022-10-13

## 2022-09-12 RX ORDER — CHOLECALCIFEROL (VITAMIN D3) 50 MCG
4000 TABLET ORAL DAILY
Qty: 30 TABLET | Refills: 0 | Status: SHIPPED | OUTPATIENT
Start: 2022-09-12 | End: 2022-10-12

## 2022-09-12 RX ADMIN — GABAPENTIN 300 MG: 300 CAPSULE ORAL at 05:30

## 2022-09-12 RX ADMIN — HEPARIN SODIUM 5000 UNITS: 5000 INJECTION, SOLUTION INTRAVENOUS; SUBCUTANEOUS at 05:29

## 2022-09-12 RX ADMIN — FERROUS SULFATE TAB 325 MG (65 MG ELEMENTAL FE) 162.5 MG: 325 (65 FE) TAB at 05:30

## 2022-09-12 RX ADMIN — LISINOPRIL 10 MG: 10 TABLET ORAL at 05:29

## 2022-09-12 RX ADMIN — ASPIRIN 81 MG: 81 TABLET, CHEWABLE ORAL at 05:29

## 2022-09-12 RX ADMIN — CYANOCOBALAMIN 1000 MCG: 1000 INJECTION, SOLUTION INTRAMUSCULAR; SUBCUTANEOUS at 05:30

## 2022-09-12 ASSESSMENT — GAIT ASSESSMENTS
ASSISTIVE DEVICE: FRONT WHEEL WALKER
GAIT LEVEL OF ASSIST: STANDBY ASSIST
DISTANCE (FEET): 300
DEVIATION: BRADYKINETIC

## 2022-09-12 ASSESSMENT — COGNITIVE AND FUNCTIONAL STATUS - GENERAL
SUGGESTED CMS G CODE MODIFIER MOBILITY: CJ
MOBILITY SCORE: 22
WALKING IN HOSPITAL ROOM: A LITTLE
CLIMB 3 TO 5 STEPS WITH RAILING: A LITTLE

## 2022-09-12 NOTE — DOCUMENTATION QUERY
"                                                                         [facility]                                                                       Query Response Note      PATIENT:               OLIVIA JOYCE  ACCT #:                  [id number]  MRN:                     [id number]  :                      1945  ADMIT DATE:       2022 1:47 PM  DISCH DATE:          RESPONDING  PROVIDER #:        [id number]           QUERY TEXT:    Chronic Kidney Disease (CKD) is documented in the Medical Record.  Please specify the disease stage (includes probable or suspected).    Stages are defined by the [employer] as follows:  CKD Stage I GFR >= 90 ml / min per 1.73 m2 and persistent albuminuria  CKD Stage 2 GFR between 60 and 89 with persistent albuminuria  CKD Stage 3a GFR between 45 - 59  CKD Stage 3b GFR between 30-44  CKD Stage 4 GFR between 15 and 29   CKD Stage 5 GFR between <15 or End Stage Renal Disease    Contact me with questions.     Thank you,  PASCUAL Phan, CDI  [email or web address]      The patient's Clinical Indicators include:  76yo with dx of HTN emergency, DM, uncontrolled HTN, PRAMOD, hypothyroidism.      09/10 [facility] \"Chronic kidney disease BUN 40 Cr 2.4\"    Epic documentation 2022 [facility] \"Chronic kidney disease stage IV\" \"In 2019 her GFR was 25\"  [facility] laboratory documentation 2022 GFR 24     GFR 20  09/10 GFR 23   GFR 22    Risk factors: advanced age, uncontrolled HTN, DM2, PRAMOD, medications  Treatments: Lab work, monitoring, avoid nephrotoxic medications, renally dose medications, IVF, BP control, DM2 control  Options provided:   -- Chronic kidney disease Stage 4   -- Other explanation:Other explanation-, please specify   -- Clinical findings of no relevance   -- Unable to determine      Query created by: Airam Medina on 2022 8:09 AM    RESPONSE TEXT:    Chronic kidney disease Stage 4          " Electronically signed by:  KEN SCHNEIDER MD 9/12/2022 8:36 AM

## 2022-09-12 NOTE — CARE PLAN
The patient is Stable - Low risk of patient condition declining or worsening    Shift Goals  Clinical Goals: monitor blood pressure  Patient Goals: sleep  Family Goals: n/a    Progress made toward(s) clinical / shift goals:    Problem: Pain - Standard  Goal: Alleviation of pain or a reduction in pain to the patient’s comfort goal  Outcome: Progressing   Pt educated on pain scale. RN aware of pt's goal pain. PRN medication orders followed.     Patient is not progressing towards the following goals:

## 2022-09-12 NOTE — THERAPY
"Physical Therapy   Daily Treatment     Patient Name: Pam Hernandez  Age:  77 y.o., Sex:  female  Medical Record #: [id number]  Today's Date: 9/12/2022     Precautions  Precautions: Fall Risk    Assessment    Pt received in bed and agreeable to PT session. Pt was able to progress with ambulation distance and reports her initial symptoms have improved greatly. Pt preferred to use the FWW for stability due to fear of falling and reports she will have a 4WW to use at home. Pt would benefit from HHPT follow-up to continue to progress with balance and monitor pt in her home environment. Will continue to follow for acute PT to progress as able with no AD.    Plan    Continue current treatment plan.    DC Equipment Recommendations:  (pt reports she has a 4WW from a friend at home)  Discharge Recommendations: Recommend home health for continued physical therapy services      Subjective    \"I want to use the walker because I feel safer\"     Objective       09/12/22 1131   Precautions   Precautions Fall Risk   Pain 0 - 10 Group   Therapist Pain Assessment Post Activity Pain Same as Prior to Activity;Nurse Notified;0   Cognition    Level of Consciousness Alert   Comments Pleasant and cooperative. Reports feeling much better than on Saturday when initially evaluated.   Balance   Sitting Balance (Static) Good   Sitting Balance (Dynamic) Fair +   Standing Balance (Static) Fair   Standing Balance (Dynamic) Fair   Weight Shift Sitting Good   Weight Shift Standing Fair   Skilled Intervention Verbal Cuing;Compensatory Strategies   Comments FWW in standing for support per pt preference   Gait Analysis   Gait Level Of Assist Standby Assist   Assistive Device Front Wheel Walker   Distance (Feet) 300   # of Times Distance was Traveled 1   Deviation Bradykinetic   Comments Pt reports feeling improved with balance since initial evaluation, but preferred the FWW for steadying due to fear of falls. Pt reports she will have a 4WW at home, so " stressed the importance of the brakes when transferring or using to sit down. Provided cues for proximity to the FWW and posture as well.   Bed Mobility    Supine to Sit Modified Independent   Sit to Supine Modified Independent   Scooting Modified Independent   Rolling Modified Independent   Functional Mobility   Sit to Stand Supervised   Bed, Chair, Wheelchair Transfer Supervised   Toilet Transfers Supervised   Transfer Method Stand Step   How much difficulty does the patient currently have...   Turning over in bed (including adjusting bedclothes, sheets and blankets)? 4   Sitting down on and standing up from a chair with arms (e.g., wheelchair, bedside commode, etc.) 4   Moving from lying on back to sitting on the side of the bed? 4   How much help from another person does the patient currently need...   Moving to and from a bed to a chair (including a wheelchair)? 4   Need to walk in a hospital room? 3   Climbing 3-5 steps with a railing? 3   6 clicks Mobility Score 22   Patient / Family Goals    Patient / Family Goal #1 to improve HA   Goal #1 Outcome Goal met   Short Term Goals    Short Term Goal # 1 Pt will ambulate x 150ft without device and supervision wihtin 6 visits to ensure progression to baseline.   Goal Outcome # 1 goal not met  (Pt preferred FWW)   Anticipated Discharge Equipment and Recommendations   DC Equipment Recommendations   (pt reports she has a 4WW from a friend at home)   Discharge Recommendations Recommend home health for continued physical therapy services   Interdisciplinary Plan of Care Collaboration   IDT Collaboration with  Nursing   Patient Position at End of Therapy Seated;Call Light within Reach;Tray Table within Reach;Phone within Reach   Collaboration Comments RN updated   Session Information   Date / Session Number  9/12-2(2/3, 9/16)

## 2022-09-12 NOTE — PROGRESS NOTES
Needs to be read and signed 10/28/22    Called [employer] for EOS Report 10/18/2022    Patient enrolled in the 30 day [employer] OT heart monitoring program, per Geri Tejada P.A.-C  >In clinic hook up, monitor S/N [id number]  >Baseline Transmitted.  >Pending EOS.

## 2022-09-12 NOTE — DISCHARGE SUMMARY
[facility] Discharge Summary    Attending: Dr. Rodriguez  Senior Resident: Dr. Amaya  Intern:  Dr. Gaming  Contact Number: [phone]    CHIEF COMPLAINT ON ADMISSION  Headache, chest pain    Reason for Admission  ems     Admission Date  9/9/2022      Discharge Date  09/12/22     CODE STATUS  DNAR, I OK    HPI & HOSPITAL COURSE  Ms. Hernandez is a 78 yo F with a PMHx of GERD DM2, hypertension, hypothyroidism, and sinus bradycardia pending outpatient cardiology evaluation by outpatient cardiology who presents for a one-week h/o fatiuge, dizziness, and generalized weakness and presents on 9/9/2022 per recommendation of  nurse for further evaluation.  Patient admitted for near syncopal work up.    * Dizziness- (present on admission)  Assessment & Plan  *leading to her presentation in hospital, had intermittent complaints of lightheadness noted upon standing while doing yard work outside.  Patient complained of wobbliness.  Positive Romberg on exam. Dehydration vs adverse effect due to medication vs sinus bradycardia vs disruption in vestibular sytem.  Echo ejection fraction 60%, normal left ventricular systolic function.  MRI showed chronic infarcts in cerebellum and pontine lacunar infarct. No signs concerning of cystitis or pneumonia.  Orthostatic pressure negative.  - stopped thiazide and pioglitazone   - decreased home dosage lasix. No lasix administered in patient because patient showed no signs of volume overload, but ok to resume now at dischargeDr.  -patient not complaining of further symptoms at discharge, also did walk-test and was asymptomatic  - zio patch will be placed today on post-discharge follow up with cardiology        Sinus bradycardia  Assessment & Plan  *Noted to be in sinus bradycardia throughout course.  Currently scheduled for visit with cardiology today as noted above, will also have appointment for pacemaker evaluation on 9/20/22.     Walk test prior to discharge unremarkable      Diastolic dysfunction  Assessment & Plan  *Noticed on previous echocardiogram.  Likely due to long-standing hypertension.  - continue lisinopril and uptitrate accordingly  - stopped thiazide      B12 Deficiency  Assessment & Plan  *B12 level low  - orders placed for 1000 ug B12 IM every other day x 1 month, 1000 ug B12 IM weekly x 1 month, and 1000 ug IM B12 every month forever.  Syringes ordered.  - home health nurse will administer at home     DM (diabetes mellitus) (HCC)  Assessment & Plan  *given 11 units of home DM in AM.  Concern for risk of hypoglyemia. a1c 8.6.  - reduced to 7 units which will be held until tomorrow morning and likley continue QAM provided that blood glucose is maintained between 140-180  - sliding scale was ordered inpatient  - held home premeal lispro   - held glargine, ordered conservative dose to start back up again at home and will have log to follow-up with primary care for potential changes/uptitrations  - hypoglycemia protocol educatioin     Hypertension  Assessment & Plan  *HCTZ is a part of home meds and diuretics.  Concern for adverse effect leading to admission.  Was stopped.  - will continue lisinopril      Chronic kidney disease  Assessment & Plan  *BUN 40. CR 2.4. when presented  - renally dosed medications while here      Neuropathy  Assessment & Plan  - returned patient back to home dosage of gabapentin to 300 mg daily because of fall risk.     Lines: pIV  Diet: Cardiac  DVT prophylaxis: Heparin  GI prophylaxis: BP  Code Status: DNAR/I OK  Disposition: Inpatient     Therefore, she is discharged in guarded and stable condition to home with close outpatient follow-up.    The patient recovered much more quickly than anticipated on admission.      Physical Exam on Day of Discharge  Vitals and nursing note reviewed.   Constitutional:       General: She is not in acute distress.     Appearance: Normal appearance. She is not ill-appearing or toxic-appearing.   HENT:      Head:  Normocephalic and atraumatic.   Eyes:      General: No scleral icterus.        Right eye: No discharge.         Left eye: No discharge.      Extraocular Movements: Extraocular movements intact.      Pupils: Pupils are equal, round, and reactive to light.   Cardiovascular:      Rate and Rhythm: Bradycardia present.      Heart sounds: No murmur heard.    No friction rub. No gallop.   Pulmonary:      Effort: No respiratory distress.      Breath sounds: No wheezing, rhonchi or rales.   Abdominal:      General: Abdomen is flat. There is no distension.      Palpations: Abdomen is soft.      Tenderness: There is no abdominal tenderness.   Musculoskeletal:         General: No swelling or tenderness. Normal range of motion.      Cervical back: Normal range of motion. No rigidity.   Skin:     General: Skin is warm and dry.      Capillary Refill: Capillary refill takes less than 2 seconds.      Coloration: Skin is not jaundiced.   Neurological:      Mental Status: She is alert and oriented to person, place, and time. Mental status is at baseline.      Comments: Cranial nerves 2-12 are grossly intact.  Sensation.  Postive Romberg, but no dysdochokinesis noted.  No pronator drift.  Negative HINTs test. Negative karina-hallpike.  Gait abnormal.  Patient appears imbalanced when walking.  Orthostatic negative both for BP and HR.  No vertical or horizontal nystagmus noted.    Psychiatric:         Mood and Affect: Mood normal.         Behavior: Behavior normal.     FOLLOW UP ITEMS POST DISCHARGE  Follow up with outpatient cardioogy for ziopatch results    DISCHARGE DIAGNOSES  Principal Problem:    Dizziness POA: Yes  Active Problems:    DM (diabetes mellitus) (HCC) POA: Unknown    Chronic kidney disease POA: Unknown    Sinus bradycardia POA: Unknown    Hypertension POA: Unknown    Diastolic dysfunction POA: Unknown    Neuropathy POA: Unknown    Urinary incontinence POA: Unknown    Low serum vitamin B12 POA: Unknown  Resolved Problems:     Hypertensive urgency POA: Unknown      FOLLOW UP  Future Appointments   Date Time Provider Department Center   9/20/2022  9:30 AM Ly Flores M.D. [facility] None     [facility]  [address] [place] [address]  [place] [address]  [phone]        MEDICATIONS ON DISCHARGE     Medication List        START taking these medications        Instructions   aspirin 81 MG Chew chewable tablet  Start taking on: September 13, 2022  Commonly known as: ASA   Chew 1 Tablet every day for 30 days.  Dose: 81 mg     atorvastatin 40 MG Tabs  Commonly known as: LIPITOR   Take 1 Tablet by mouth every evening for 30 days.  Dose: 40 mg     BD Plastipak Syringe 3 ML Misc  Generic drug: Syringe   use to draw B12 injection use as directed by MD  (use to draw B12 injection use as directed by MD)     Insulin Pen Needle 32 G x 4 mm   Use to inject insulin daily as directed     Lantus SoloStar 100 UNIT/ML Sopn injection  Generic drug: insulin glargine   Inject 5 Units under the skin every morning for 30 days.  Dose: 5 Units            CHANGE how you take these medications        Instructions   lisinopril 10 MG Tabs  Start taking on: September 13, 2022  What changed: medication strength  Commonly known as: PRINIVIL   Take 1 Tablet by mouth every day for 30 days.  Dose: 10 mg            CONTINUE taking these medications        Instructions   Brimonidine Tartrate-Timolol 0.2-0.5 % Soln   Administer 1 Drop into both eyes 2 times a day.  Dose: 1 Drop     Carboxymethylcellulose Sod PF 0.5 % Soln   Administer 1 Drop into both eyes every 2 hours while awake.  Dose: 1 Drop     ferrous sulfate 325 (65 Fe) MG tablet   Take 162.5 mg by mouth every day. 162.5 mg = 1/2 tablet  Dose: 162.5 mg     furosemide 20 MG Tabs  Commonly known as: LASIX   Take 20 mg by mouth 1 time a day as needed (For leg swelling).  Dose: 20 mg     gabapentin 100 MG Caps  Commonly known as: NEURONTIN   Take 300 mg by mouth every day.  Dose: 300 mg     latanoprost 0.005 %  Soln  Commonly known as: XALATAN   Place 1 Drop in both eyes every evening.  Dose: 1 Drop     vitamin D 2000 UNIT Tabs   Take 2 Tablets by mouth every day for 30 days. 4000 units = 2 tablets  Dose: 4,000 Units            STOP taking these medications      Empagliflozin 10 MG Tabs     hydroCHLOROthiazide 12.5 MG tablet  Commonly known as: HYDRODIURIL     pioglitazone 30 MG Tabs  Commonly known as: ACTOS     potassium chloride ER 10 MEQ tablet  Commonly known as: KLOR-CON     Tresiba FlexTouch 200 UNIT/ML Sopn  Generic drug: Insulin Degludec            ASK your doctor about these medications        Instructions   * cyanocobalamin 1000 MCG/ML Soln  Commonly known as: VITAMIN B-12  Ask about: Which instructions should I use?   Inject 1 ml intramuscularly every other day for 2 weeks then inject 1 ml intramuscularly weekly for 1 month. Then inject 1 ml intramuscularly monthly thereafter  (Inject 1 ml intramuscularly every other day for 2 weeks then inject 1 ml IM weekly for 1 month. Then inject 1 ml intramuscularly monthly thereafter)  Dose: 1,000 mcg     * cyanocobalamin 1000 MCG/ML Soln  Start taking on: September 14, 2022  Commonly known as: VITAMIN B-12  Ask about: Which instructions should I use?   Inject 1 mL into the shoulder, thigh, or buttocks every Monday, Wednesday, Friday, and Saturday for 15 days.  Dose: 1,000 mcg           * This list has 2 medication(s) that are the same as other medications prescribed for you. Read the directions carefully, and ask your doctor or other care provider to review them with you.                  Allergies  Allergies   Allergen Reactions    Novocain Anaphylaxis       DIET  Orders Placed This Encounter   Procedures    Diet Order Diet: Cardiac; Second Modifier: (optional): Renal; Miscellaneous modifications: (optional): Vegetarian     Standing Status:   Standing     Number of Occurrences:   1     Order Specific Question:   Diet:     Answer:   Cardiac [6]     Order Specific  Question:   Second Modifier: (optional)     Answer:   Renal [8]     Order Specific Question:   Miscellaneous modifications: (optional)     Answer:   Vegetarian [13]       ACTIVITY  As tolerated.  Weight bearing as tolerated    CONSULTATIONS      PROCEDURES      LABORATORY  Lab Results   Component Value Date    SODIUM 133 (L) 09/12/2022    POTASSIUM 4.2 09/12/2022    CHLORIDE 97 09/12/2022    CO2 25 09/12/2022    GLUCOSE 204 (H) 09/12/2022    BUN 38 (H) 09/12/2022    CREATININE 2.25 (H) 09/12/2022    CREATININE 0.9 08/09/2007        Lab Results   Component Value Date    WBC 5.5 09/12/2022    HEMOGLOBIN 12.6 09/12/2022    HEMATOCRIT 37.9 09/12/2022    PLATELETCT 161 (L) 09/12/2022        Total time of the discharge process lasted a time duration of 60 mins..

## 2022-09-12 NOTE — PROGRESS NOTES
Monitor Summary  Rhythm: SB/SR  Rate: 55-76  Ectopy: N/A  .18 / .06 / .47   No monitor strip uploaded.

## 2022-09-12 NOTE — PROGRESS NOTES
Assumed care of patient at bedside report from day RN. Updated on POC. Patient currently A & O x 4; on room air; up standby; without complaints of acute pain. Assessment completed Call light within reach. Whiteboard updated. Fall precautions in place. Bed locked and in lowest position. All questions answered. No other needs indicated at this time.

## 2022-09-13 ENCOUNTER — HOME CARE VISIT (OUTPATIENT)
Dept: HOME HEALTH SERVICES | Facility: HOME HEALTHCARE | Age: 77
End: 2022-09-13

## 2022-09-13 NOTE — DISCHARGE PLANNING
[facility] TCN chart review completed. Collaborated with RASHEED Orozco.  Per chart review, [facility] has accepted.  Per PT notes:   pt reports she has a 4WW from a friend at home  Patient seen at bedside. TCN will continue to follow and collaborate with discharge planning team as additional post acute needs arise. Thank you.    Previously completed:  - Choice forms: SOCORRO IVEY (walker) obtained 9/11/22   - [facility]SC (Y), Sent

## 2022-09-18 PROBLEM — I63.9 CVA (CEREBRAL VASCULAR ACCIDENT) (HCC): Chronic | Status: ACTIVE | Noted: 2022-09-18

## 2022-09-18 PROBLEM — I63.9 CVA (CEREBRAL VASCULAR ACCIDENT) (HCC): Status: ACTIVE | Noted: 2022-09-18

## 2022-09-19 NOTE — PROGRESS NOTES
Cardiology Initial Consultation Note    Date of note: 9/18/2022    Primary Care Provider: Brittni Zhou M.D.  Referring Provider: rBittni Zhou M.D.    Patient Name: Pam Hernandez  YOB: 1945  MRN:              [id number]    Chief Complaint   Patient presents with    Bradycardia     Stroke    History of Present Illness: Ms. Pam Hernandez is a 77 y.o. female whose current medical problems include diabetes, hypertension, hyperlipidemia, who is here for cardiac consultation for sinus bradycardia and stroke.    Patient was seen in the emergency room for chest pain that is quite atypical, unsteadiness, and headaches.  The patient was admitted.  Brain MRI suggested chronic left paramedial pontine lacunar infarct, left cerebellar and left inferior medial occipital lobe infarcts, nothing acute.  They started aspirin and also increase the atorvastatin to 40 mg, however patient states not taking anything at this time.  They stopped hydrochlorothiazide and decrease the lisinopril to 10 mg orally daily.  She was noted to have sinus bradycardia mostly in the 50s and a bio tell monitor was placed.  She currently is wearing it.    Per patient, has been feeling weak since being discharged.  She has a visiting nurse who is in and helps with history.  The patient reports when her pulse is in the 40s, she does feel lightheaded.  She gave me readings which shows pulse mostly in the 50s.  Blood pressure usually is 130s to 140s systolic.  She did have one blood pressure reading yesterday at 94 with pulse of 50.  She currently is wearing the biotome monitor but it she has not been acting feeding it when she has symptoms.    In terms of her chest pain, she states it is more shoulder pain and it is constant not associated with any symptoms.    Cardiovascular Risk Factors:  1. Smoking status:   Tobacco Use: Low Risk     Smoking Tobacco Use: Never    Smokeless Tobacco Use: Never     2. Type II Diabetes Mellitus:   Lab  Results   Component Value Date/Time    HBA1C 8.6 (H) 09/09/2022 02:00 PM    HBA1C 9.5 (A) 06/17/2019 09:15 AM     3. Hypertension: Yes on lisinopril and HCTZ  4. Dyslipidemia: No  Cholesterol,Tot   Date Value Ref Range Status   09/11/2022 176 100 - 199 mg/dL Final     LDL   Date Value Ref Range Status   09/11/2022 112 (H) <100 mg/dL Final     HDL   Date Value Ref Range Status   09/11/2022 41 >=40 mg/dL Final     Triglycerides   Date Value Ref Range Status   09/11/2022 113 0 - 149 mg/dL Final     5. Family history of early Coronary Artery Disease in a first degree relative (Male less than 55 years of age; Female less than 65 years of age): No premature coronary artery disease.    Past Medical History:   Diagnosis Date    Acid reflux     Acute nasopharyngitis [id number]    Anemia     Anesthesia     reports wants sleep, hard to wake up    Arthritis     fingers/hands    At risk for falling     Cataract     removed Dr.Stanko benajmín    Dental disorder     partial lower, plate upper    Diabetes     oral meds and insulin dependent    Elevated lipase 1/23/2019    Glaucoma     bilat    Hyperkalemia 1/23/2019    Hypertension     Hypothyroid 12/10/2018    on no meds at this time    Neuropathy (HCC)     Pneumonia     2015    Psychiatric problem     depression    Snoring     no sleep study    Urinary frequency     Urinary incontinence     wears a pad       Past Surgical History:   Procedure Laterality Date    MADELINE BY LAPAROSCOPY N/A 10/18/2019    Procedure: CHOLECYSTECTOMY, LAPAROSCOPIC;  Surgeon: Kathleen Pierson M.D.;  Location: SURGERY SAME DAY [facility] ORS;  Service: General    VITRECTOMY POSTERIOR Right 12/11/2018    Procedure: VITRECTOMY POSTERIOR-  LASER;  Surgeon: Katina Bolaños M.D.;  Location: SURGERY SAME DAY [facility] ORS;  Service: Ophthalmology    EYE SURGERY Left 2018    for glaucoma    CATARACT EXTRACTION WITH IOL Bilateral     US-NEEDLE CORE BX-BREAST PANEL         Current Outpatient Medications   Medication  Sig Dispense Refill    atorvastatin (LIPITOR) 10 MG Tab       TRUE METRIX BLOOD GLUCOSE TEST strip       DROPLET PEN NEEDLES       lisinopril (PRINIVIL) 10 MG Tab Take 1 Tablet by mouth every day for 30 days. 30 Tablet 0    aspirin (ASA) 81 MG Chew Tab chewable tablet Chew 1 Tablet every day for 30 days. 30 Tablet 0    insulin glargine (LANTUS SOLOSTAR) 100 UNIT/ML Solution Pen-injector injection Inject 5 Units under the skin every morning for 30 days. 3 mL 0    Insulin Pen Needle 32 G x 4 mm Use to inject insulin daily as directed 100 Each 0    Cholecalciferol (VITAMIN D) 2000 UNIT Tab Take 2 Tablets by mouth every day for 30 days. 4000 units = 2 tablets 30 Tablet 0    Syringe (BD PLASTIPAK SYRINGE) 3 ML Misc use to draw B12 injection use as directed by MD 11 Each 3    Carboxymethylcellulose Sod PF 0.5 % Solution Administer 1 Drop into both eyes every 2 hours while awake.      furosemide (LASIX) 20 MG Tab Take 20 mg by mouth 1 time a day as needed (For leg swelling).      Brimonidine Tartrate-Timolol 0.2-0.5 % Solution Administer 1 Drop into both eyes 2 times a day.      ferrous sulfate 325 (65 Fe) MG tablet Take 162.5 mg by mouth every day. 162.5 mg = 1/2 tablet      gabapentin (NEURONTIN) 100 MG Cap Take 300 mg by mouth every day.      latanoprost (XALATAN) 0.005 % Solution Place 1 Drop in both eyes every evening.       No current facility-administered medications for this visit.       Allergies   Allergen Reactions    Epinephrine      Other reaction(s): Dizziness    Nalbuphine      Other reaction(s): Depression    Novocain Anaphylaxis    Pseudoephedrine      Caused depression.  Other reaction(s): Depression    Levofloxacin      Other reaction(s): Pruritic rash     Family History   Problem Relation Age of Onset    Coronary artery disease Mother        Social History     Socioeconomic History    Marital status:    Tobacco Use    Smoking status: Never    Smokeless tobacco: Never   Vaping Use    Vaping Use:  "Never used   Substance and Sexual Activity    Alcohol use: No    Drug use: No        Review of Systems   Constitutional: Negative for diaphoresis and fever.   Respiratory:  Negative for cough, hemoptysis and wheezing.    Gastrointestinal:  Negative for hematochezia and melena.   Genitourinary:  Negative for hematuria.     Physical Exam:  Ambulatory Vitals  /68 (BP Location: Right arm, Patient Position: Sitting, BP Cuff Size: Large adult)   Pulse 64   Resp 16   Ht 1.575 m (5' 2\")   Wt 73.9 kg (163 lb)   SpO2 98%    Oxygen Therapy:  Pulse Oximetry: 98 %  BP Readings from Last 4 Encounters:   09/20/22 136/68   09/14/22 138/70   09/12/22 133/63   07/14/22 126/72       Weight/BMI:   Body mass index is 29.81 kg/m².  Wt Readings from Last 2 Encounters:   09/20/22 73.9 kg (163 lb)   09/11/22 72.5 kg (159 lb 13.3 oz)       Physical Exam  Constitutional:       Appearance: Normal appearance.   Eyes:      Extraocular Movements: Extraocular movements intact.      Conjunctiva/sclera: Conjunctivae normal.   Neck:      Vascular: No carotid bruit or JVD.   Cardiovascular:      Rate and Rhythm: Normal rate and regular rhythm.      Pulses:           Radial pulses are 2+ on the right side and 2+ on the left side.        Posterior tibial pulses are 1+ on the right side and 1+ on the left side.      Heart sounds: Normal heart sounds. No murmur heard.    No friction rub. No gallop.   Pulmonary:      Effort: Pulmonary effort is normal. No respiratory distress.      Breath sounds: Normal breath sounds. No wheezing.   Abdominal:      General: Bowel sounds are normal.      Palpations: Abdomen is soft.   Musculoskeletal:      Cervical back: Neck supple.      Right lower leg: No edema.      Left lower leg: No edema.   Skin:     General: Skin is warm and dry.   Neurological:      Mental Status: She is alert and oriented to person, place, and time. Mental status is at baseline.   Psychiatric:         Mood and Affect: Mood normal.    "     Lab Data Review:  Lab Results   Component Value Date/Time    SODIUM 133 (L) 09/12/2022 02:08 AM    POTASSIUM 4.2 09/12/2022 02:08 AM    CHLORIDE 97 09/12/2022 02:08 AM    CO2 25 09/12/2022 02:08 AM    GLUCOSE 204 (H) 09/12/2022 02:08 AM    BUN 38 (H) 09/12/2022 02:08 AM    CREATININE 2.25 (H) 09/12/2022 02:08 AM    CREATININE 0.9 08/09/2007 04:52 AM     Lab Results   Component Value Date/Time    ALKPHOSPHAT 105 (H) 09/12/2022 02:08 AM    ASTSGOT 24 09/12/2022 02:08 AM    ALTSGPT 14 09/12/2022 02:08 AM    TBILIRUBIN 0.4 09/12/2022 02:08 AM      Lab Results   Component Value Date/Time    WBC 5.5 09/12/2022 02:08 AM     TSH 9/10/2022: 4.08    Cardiac Imaging and Procedures Review:    EKG dated 9/9/2022: My personal interpretation is sinus rhythm, 54 bpm, low voltages, compared to prior ECG rate is a little bit slower compared to prior 74 bpm.    Echo dated 9/10/2022: My personal interpretation is normal left and right ventricular systolic function.  No significant valvular stenosis or regurgitation.    Event monitor has been ordered but is still currently being completed.    My personal review of auto capture events thus far shows thus far just sinus rhythm with range of .  The pulse of 48 was at 540 in the morning likely when sleep.  Would continue to monitor as there is no clear indication here a pacemaker is indicated.    Assessment & Plan     1.  Weakness, lightheadedness, possible near syncope.  Differential diagnosis would include possible symptomatic bradycardia as no other significant bradycardia arrhythmias have been captured.  Went over results of her EKG and echo.  By her report if her resting heart rate at times are in the 40s and she symptomatic, this is a class IIa indication for a permanent pacemaker discussed with her risks and benefits of a permanent pacemaker.  She states if needed she would be agreeable to have this done.  She is still currently wearing the [employer]Search monitor.  There is some  technical issues.  We will schedule a video virtual visit next week to discuss results.  If there is indication, we will plan for referral to EP for possible pacemaker placement.    Shared Medical Decision Making:  All of patient's questions were answered to the best of my knowledge and to patient's satisfaction. It was a pleasure seeing Ms. Pam Hernandez in my clinic today. Return in about 1 week (around 9/27/2022) for Short. Patient is aware to call the cardiology clinic with any questions or concerns.    Ly Flores MD  [facility]  [address]  [place] [address]  Phone:  [phone]  Fax:  [phone]    Please note that this dictation was created using voice recognition software. I have made every reasonable attempt to correct obvious errors, but it is possible there are errors of grammar and possibly content that I did not discover before finalizing the note.

## 2022-09-20 ENCOUNTER — OFFICE VISIT (OUTPATIENT)
Dept: CARDIOLOGY | Facility: MEDICAL CENTER | Age: 77
End: 2022-09-20
Payer: MEDICARE

## 2022-09-20 VITALS
WEIGHT: 163 LBS | HEART RATE: 64 BPM | RESPIRATION RATE: 16 BRPM | HEIGHT: 62 IN | OXYGEN SATURATION: 98 % | DIASTOLIC BLOOD PRESSURE: 68 MMHG | BODY MASS INDEX: 30 KG/M2 | SYSTOLIC BLOOD PRESSURE: 136 MMHG

## 2022-09-20 DIAGNOSIS — R07.89 OTHER CHEST PAIN: ICD-10-CM

## 2022-09-20 PROBLEM — R00.1 SINUS BRADYCARDIA: Chronic | Status: ACTIVE | Noted: 2022-09-10

## 2022-09-20 PROCEDURE — 99213 OFFICE O/P EST LOW 20 MIN: CPT | Performed by: INTERNAL MEDICINE

## 2022-09-20 RX ORDER — PEN NEEDLE, DIABETIC 31 GX5/16"
NEEDLE, DISPOSABLE MISCELLANEOUS
COMMUNITY
Start: 2022-09-16

## 2022-09-20 RX ORDER — LISINOPRIL 10 MG/1
1 TABLET ORAL DAILY
COMMUNITY
Start: 2022-09-13 | End: 2022-09-20

## 2022-09-20 RX ORDER — CALCIUM CITRATE/VITAMIN D3 200MG-6.25
TABLET ORAL
COMMUNITY
Start: 2022-08-01

## 2022-09-20 RX ORDER — ASPIRIN 81 MG/1
TABLET, CHEWABLE ORAL
COMMUNITY
Start: 2022-09-13 | End: 2022-09-20

## 2022-09-20 RX ORDER — CHOLECALCIFEROL (VITAMIN D3) 125 MCG
CAPSULE ORAL
COMMUNITY
Start: 2022-09-13 | End: 2022-09-20

## 2022-09-20 RX ORDER — ATORVASTATIN CALCIUM 10 MG/1
TABLET, FILM COATED ORAL
COMMUNITY
Start: 2022-08-01

## 2022-09-20 RX ORDER — HYDROCHLOROTHIAZIDE 12.5 MG/1
CAPSULE, GELATIN COATED ORAL
COMMUNITY
Start: 2022-08-01 | End: 2022-09-20

## 2022-09-20 ASSESSMENT — ENCOUNTER SYMPTOMS
HEMOPTYSIS: 0
COUGH: 0
HEMATOCHEZIA: 0
DIAPHORESIS: 0
FEVER: 0
WHEEZING: 0

## 2022-09-20 ASSESSMENT — FIBROSIS 4 INDEX: FIB4 SCORE: 3.07

## 2022-09-28 ENCOUNTER — OFFICE VISIT (OUTPATIENT)
Dept: CARDIOLOGY | Facility: MEDICAL CENTER | Age: 77
End: 2022-09-28
Payer: MEDICARE

## 2022-09-28 VITALS
WEIGHT: 166 LBS | OXYGEN SATURATION: 98 % | RESPIRATION RATE: 14 BRPM | HEART RATE: 69 BPM | DIASTOLIC BLOOD PRESSURE: 64 MMHG | SYSTOLIC BLOOD PRESSURE: 136 MMHG | HEIGHT: 62 IN | BODY MASS INDEX: 30.55 KG/M2

## 2022-09-28 DIAGNOSIS — R00.1 SINUS BRADYCARDIA: ICD-10-CM

## 2022-09-28 PROCEDURE — 99213 OFFICE O/P EST LOW 20 MIN: CPT | Performed by: INTERNAL MEDICINE

## 2022-09-28 ASSESSMENT — ENCOUNTER SYMPTOMS
SYNCOPE: 0
HEMATOCHEZIA: 0
ORTHOPNEA: 0
PND: 0
DYSPNEA ON EXERTION: 0
RESPIRATORY NEGATIVE: 1
NEAR-SYNCOPE: 0
PALPITATIONS: 0

## 2022-09-28 ASSESSMENT — FIBROSIS 4 INDEX: FIB4 SCORE: 3.07

## 2022-09-28 NOTE — PROGRESS NOTES
Cardiology Follow Up Note    Date of note: 9/28/2022    Primary Care Provider: Brittni Zhou M.D.    Patient Name: Pam Hernandez  YOB: 1945  MRN:              [id number]    Reason for Followup: Event monitor results    History of Present Illness: Ms. Pam Hernandez is a 77 y.o. female whose current medical problems include diabetes, hypertension, hyperlipidemia, recent stroke who is here for cardiac for follow up.  Bio tell results due to sinus bradycardia.    The patient reports since I last saw her on 9/20, she feels stronger, has not had any more lightheadedness or dizziness.  She actually went to see her grandsons baseball game today and felt pretty good.  She reports she actually mailed back her event monitor yesterday.    Cardiovascular Risk Factors:  1. Smoking status:   Tobacco Use: Low Risk     Smoking Tobacco Use: Never    Smokeless Tobacco Use: Never     2. Type II Diabetes Mellitus:   Lab Results   Component Value Date/Time    HBA1C 8.6 (H) 09/09/2022 02:00 PM    HBA1C 9.5 (A) 06/17/2019 09:15 AM     3. Hypertension: Yes on lisinopril  4. Dyslipidemia: Yes on a atorvastatin  Cholesterol,Tot   Date Value Ref Range Status   09/11/2022 176 100 - 199 mg/dL Final     LDL   Date Value Ref Range Status   09/11/2022 112 (H) <100 mg/dL Final     HDL   Date Value Ref Range Status   09/11/2022 41 >=40 mg/dL Final     Triglycerides   Date Value Ref Range Status   09/11/2022 113 0 - 149 mg/dL Final     No problems updated.     Past Surgical History:   Procedure Laterality Date    MADELINE BY LAPAROSCOPY N/A 10/18/2019    Procedure: CHOLECYSTECTOMY, LAPAROSCOPIC;  Surgeon: Kathleen Pierson M.D.;  Location: SURGERY SAME DAY [facility];  Service: General    VITRECTOMY POSTERIOR Right 12/11/2018    Procedure: VITRECTOMY POSTERIOR-  LASER;  Surgeon: Katina Bolaños M.D.;  Location: SURGERY SAME DAY [facility];  Service: Ophthalmology    EYE SURGERY Left 2018    for glaucoma    CATARACT EXTRACTION  WITH IOL Bilateral     US-NEEDLE CORE BX-BREAST PANEL          Current Outpatient Medications   Medication Sig Dispense Refill    diclofenac sodium (VOLTAREN) 1 % Gel       atorvastatin (LIPITOR) 10 MG Tab       TRUE METRIX BLOOD GLUCOSE TEST strip       DROPLET PEN NEEDLES       lisinopril (PRINIVIL) 10 MG Tab Take 1 Tablet by mouth every day for 30 days. 30 Tablet 0    aspirin (ASA) 81 MG Chew Tab chewable tablet Chew 1 Tablet every day for 30 days. 30 Tablet 0    insulin glargine (LANTUS SOLOSTAR) 100 UNIT/ML Solution Pen-injector injection Inject 5 Units under the skin every morning for 30 days. 3 mL 0    Insulin Pen Needle 32 G x 4 mm Use to inject insulin daily as directed 100 Each 0    Cholecalciferol (VITAMIN D) 2000 UNIT Tab Take 2 Tablets by mouth every day for 30 days. 4000 units = 2 tablets 30 Tablet 0    Syringe (BD PLASTIPAK SYRINGE) 3 ML Misc use to draw B12 injection use as directed by MD 11 Each 3    Carboxymethylcellulose Sod PF 0.5 % Solution Administer 1 Drop into both eyes every 2 hours while awake.      furosemide (LASIX) 20 MG Tab Take 20 mg by mouth 1 time a day as needed (For leg swelling).      Brimonidine Tartrate-Timolol 0.2-0.5 % Solution Administer 1 Drop into both eyes 2 times a day.      ferrous sulfate 325 (65 Fe) MG tablet Take 162.5 mg by mouth every day. 162.5 mg = 1/2 tablet      gabapentin (NEURONTIN) 100 MG Cap Take 300 mg by mouth every day.      latanoprost (XALATAN) 0.005 % Solution Place 1 Drop in both eyes every evening.       No current facility-administered medications for this visit.       Allergies   Allergen Reactions    Epinephrine      Other reaction(s): Dizziness    Nalbuphine      Other reaction(s): Depression    Novocain Anaphylaxis    Pseudoephedrine      Caused depression.  Other reaction(s): Depression    Levofloxacin      Other reaction(s): Pruritic rash     Review of Systems   Cardiovascular:  Negative for chest pain, dyspnea on exertion, leg swelling,  "near-syncope, orthopnea, palpitations, paroxysmal nocturnal dyspnea and syncope.   Respiratory: Negative.     Gastrointestinal:  Negative for hematochezia and melena.     Physical Exam:  Ambulatory Vitals  /64 (BP Location: Left arm, Patient Position: Sitting, BP Cuff Size: Adult)   Pulse 69   Resp 14   Ht 1.575 m (5' 2\")   Wt 75.3 kg (166 lb)   SpO2 98%    Oxygen Therapy:  Pulse Oximetry: 98 %  BP Readings from Last 4 Encounters:   09/28/22 136/64   09/20/22 136/68   09/14/22 138/70   09/12/22 133/63     Weight/BMI:   Body mass index is 30.36 kg/m².  Wt Readings from Last 2 Encounters:   09/28/22 75.3 kg (166 lb)   09/20/22 73.9 kg (163 lb)       Physical Exam  Constitutional:       Appearance: Normal appearance.   Neck:      Vascular: No JVD.   Cardiovascular:      Rate and Rhythm: Normal rate and regular rhythm.      Pulses: Normal pulses and intact distal pulses.      Heart sounds: S1 normal and S2 normal. No murmur heard.    No friction rub. No gallop.   Pulmonary:      Effort: Pulmonary effort is normal. No respiratory distress.      Breath sounds: Normal breath sounds. No wheezing or rales.   Abdominal:      General: Bowel sounds are normal.      Palpations: Abdomen is soft.   Musculoskeletal:      Cervical back: Neck supple.   Neurological:      Mental Status: She is alert and oriented to person, place, and time.        Lab Data Review:  Lab Results   Component Value Date/Time    SODIUM 133 (L) 09/12/2022 02:08 AM    POTASSIUM 4.2 09/12/2022 02:08 AM    CHLORIDE 97 09/12/2022 02:08 AM    CO2 25 09/12/2022 02:08 AM    GLUCOSE 204 (H) 09/12/2022 02:08 AM    BUN 38 (H) 09/12/2022 02:08 AM    CREATININE 2.25 (H) 09/12/2022 02:08 AM    CREATININE 0.9 08/09/2007 04:52 AM     Lab Results   Component Value Date/Time    ALKPHOSPHAT 105 (H) 09/12/2022 02:08 AM    ASTSGOT 24 09/12/2022 02:08 AM    ALTSGPT 14 09/12/2022 02:08 AM    TBILIRUBIN 0.4 09/12/2022 02:08 AM      Lab Results   Component Value " Date/Time    WBC 5.5 09/12/2022 02:08 AM     Lab Results   Component Value Date/Time    TSHULTRASEN 4.080 09/10/2022 09:51 AM         Cardiac Imaging and Procedures Review:      Bio tell monitor dated duration is 9/12/2022 to 10/11/2022:   My personal interpretation is thus far review has not shown any significant bradycardia.  Lowest heart rate is 50, highest heart rate 117, average 57 bpm.  There is no patient triggered event    Assessment & Plan     1.  Lightheaded dizziness and sinus bradycardia.  The patient currently no longer has lightheadedness or dizziness and is slowly getting stronger.  I reviewed her event monitor results on [employer] and there was no significant arrhythmias, no pauses no significant arrhythmias.  No symptoms really.  Therefore indication for pacemaker not met.  No pacemaker indicated at this time.  Discussed with the patient.  If symptoms should recur then patient should return to clinic for evaluation.    Shared Medical Decision Making:  All of patient's questions were answered to the best of my knowledge and to patient's satisfaction. It was a pleasure seeing Ms. Pam Hernandez in my clinic today. Return if symptoms worsen or fail to improve. Patient is aware to call the cardiology clinic with any questions or concerns.    Ly Flores MD  [facility]  [address]  [place] [address]  Phone:  [phone]  Fax:  [phone]    Please note that this dictation was created using voice recognition software. I have made every reasonable attempt to correct obvious errors, but it is possible there are errors of grammar and possibly content that I did not discover before finalizing the note.

## 2022-10-18 ENCOUNTER — TELEPHONE (OUTPATIENT)
Dept: CARDIOLOGY | Facility: MEDICAL CENTER | Age: 77
End: 2022-10-18
Payer: MEDICARE

## 2022-10-18 DIAGNOSIS — I63.9 CEREBELLAR INFARCT (HCC): ICD-10-CM

## 2022-10-25 PROCEDURE — 93228 REMOTE 30 DAY ECG REV/REPORT: CPT | Performed by: INTERNAL MEDICINE

## 2022-11-10 ENCOUNTER — HOSPITAL ENCOUNTER (OUTPATIENT)
Dept: RADIOLOGY | Facility: MEDICAL CENTER | Age: 77
End: 2022-11-10
Attending: INTERNAL MEDICINE
Payer: MEDICARE

## 2022-11-10 DIAGNOSIS — N18.4 CHRONIC KIDNEY DISEASE, STAGE IV (SEVERE) (HCC): ICD-10-CM

## 2022-11-10 DIAGNOSIS — E11.9 DIABETES MELLITUS WITHOUT COMPLICATION (HCC): ICD-10-CM

## 2022-11-10 DIAGNOSIS — E78.5 HYPERLIPIDEMIA, UNSPECIFIED HYPERLIPIDEMIA TYPE: ICD-10-CM

## 2022-11-10 DIAGNOSIS — I12.9 RENAL HYPERTENSION: ICD-10-CM

## 2022-11-10 DIAGNOSIS — N25.81 SECONDARY HYPERPARATHYROIDISM OF RENAL ORIGIN (HCC): ICD-10-CM

## 2022-11-10 DIAGNOSIS — E11.21 DIABETIC GLOMERULOPATHY (HCC): ICD-10-CM

## 2022-11-10 DIAGNOSIS — M19.90 ACUTE ARTHRITIS: ICD-10-CM

## 2022-11-10 DIAGNOSIS — E55.9 AVITAMINOSIS D: ICD-10-CM

## 2022-11-10 PROCEDURE — 76775 US EXAM ABDO BACK WALL LIM: CPT

## 2022-12-21 ENCOUNTER — DOCUMENTATION (OUTPATIENT)
Dept: HEALTH INFORMATION MANAGEMENT | Facility: OTHER | Age: 77
End: 2022-12-21
Payer: MEDICARE

## 2023-02-20 ENCOUNTER — OFFICE VISIT (OUTPATIENT)
Dept: URGENT CARE | Facility: CLINIC | Age: 78
End: 2023-02-20
Payer: MEDICARE

## 2023-02-20 VITALS
HEART RATE: 74 BPM | HEIGHT: 62 IN | OXYGEN SATURATION: 98 % | RESPIRATION RATE: 16 BRPM | SYSTOLIC BLOOD PRESSURE: 124 MMHG | DIASTOLIC BLOOD PRESSURE: 80 MMHG | TEMPERATURE: 97.5 F | WEIGHT: 166 LBS | BODY MASS INDEX: 30.55 KG/M2

## 2023-02-20 DIAGNOSIS — M54.12 CERVICAL RADICULOPATHY: ICD-10-CM

## 2023-02-20 PROCEDURE — 99213 OFFICE O/P EST LOW 20 MIN: CPT | Performed by: FAMILY MEDICINE

## 2023-02-20 ASSESSMENT — FIBROSIS 4 INDEX: FIB4 SCORE: 3.07

## 2023-02-20 ASSESSMENT — ENCOUNTER SYMPTOMS
TINGLING: 1
FEVER: 0

## 2023-02-21 NOTE — PROGRESS NOTES
Subjective:     Pam Hernandez is a 77 y.o. female who presents for Neck Pain and Arm Pain (Started on Friday not sure what caused it)    HPI  Pt presents for evaluation of an acute problem  Patient with new neck and shoulder pain which started 3 days ago  Has some tingling and pain throughout left arm   Tingling all the way to the finger tips   Pain is constant   No fall or injury that she can recall.  She did lift a heavy case of water earlier in the day, but unsure if this contributed to her pain  Has not had a pain like this before   Pain is much worse when extending neck  No weakness or difficulties moving her left upper extremity, just pain    Review of Systems   Constitutional:  Negative for fever.   Neurological:  Positive for tingling.     PMH:  has a past medical history of Acid reflux, Acute nasopharyngitis (10/0705112), Anemia, Anesthesia, Arthritis, At risk for falling, Cataract, Dental disorder, Diabetes, Elevated lipase (1/23/2019), Glaucoma, Hyperkalemia (1/23/2019), Hypertension, Hypothyroid (12/10/2018), Neuropathy (HCC), Pneumonia, Psychiatric problem, Snoring, Urinary frequency, and Urinary incontinence.  MEDS:   Current Outpatient Medications:     diclofenac sodium (VOLTAREN) 1 % Gel, , Disp: , Rfl:     atorvastatin (LIPITOR) 10 MG Tab, , Disp: , Rfl:     TRUE METRIX BLOOD GLUCOSE TEST strip, , Disp: , Rfl:     DROPLET PEN NEEDLES, , Disp: , Rfl:     Insulin Pen Needle 32 G x 4 mm, Use to inject insulin daily as directed, Disp: 100 Each, Rfl: 0    Syringe (BD PLASTIPAK SYRINGE) 3 ML Misc, use to draw B12 injection use as directed by MD, Disp: 11 Each, Rfl: 3    Carboxymethylcellulose Sod PF 0.5 % Solution, Administer 1 Drop into both eyes every 2 hours while awake., Disp: , Rfl:     furosemide (LASIX) 20 MG Tab, Take 20 mg by mouth 1 time a day as needed (For leg swelling)., Disp: , Rfl:     Brimonidine Tartrate-Timolol 0.2-0.5 % Solution, Administer 1 Drop into both eyes 2 times a day.,  "Disp: , Rfl:     ferrous sulfate 325 (65 Fe) MG tablet, Take 162.5 mg by mouth every day. 162.5 mg = 1/2 tablet, Disp: , Rfl:     gabapentin (NEURONTIN) 100 MG Cap, Take 300 mg by mouth every day., Disp: , Rfl:     latanoprost (XALATAN) 0.005 % Solution, Place 1 Drop in both eyes every evening., Disp: , Rfl:   ALLERGIES:   Allergies   Allergen Reactions    Epinephrine      Other reaction(s): Dizziness    Nalbuphine      Other reaction(s): Depression    Novocain Anaphylaxis    Pseudoephedrine      Caused depression.  Other reaction(s): Depression    Levofloxacin      Other reaction(s): Pruritic rash     SURGHX:   Past Surgical History:   Procedure Laterality Date    MADELINE BY LAPAROSCOPY N/A 10/18/2019    Procedure: CHOLECYSTECTOMY, LAPAROSCOPIC;  Surgeon: Kathleen Pierson M.D.;  Location: SURGERY SAME DAY [facility];  Service: General    VITRECTOMY POSTERIOR Right 12/11/2018    Procedure: VITRECTOMY POSTERIOR-  LASER;  Surgeon: Katina Bolaños M.D.;  Location: SURGERY SAME DAY [facility];  Service: Ophthalmology    EYE SURGERY Left 2018    for glaucoma    CATARACT EXTRACTION WITH IOL Bilateral     US-NEEDLE CORE BX-BREAST PANEL       SOCHX:  reports that she has never smoked. She has never used smokeless tobacco. She reports that she does not drink alcohol and does not use drugs.     Objective:   /80 (BP Location: Right arm, Patient Position: Sitting, BP Cuff Size: Adult long)   Pulse 74   Temp 36.4 °C (97.5 °F) (Temporal)   Resp 16   Ht 1.575 m (5' 2\")   Wt 75.3 kg (166 lb)   LMP  (LMP Unknown)   SpO2 98%   BMI 30.36 kg/m²     Physical Exam  Constitutional:       General: She is not in acute distress.     Appearance: She is well-developed. She is not diaphoretic.   Pulmonary:      Effort: Pulmonary effort is normal.   Musculoskeletal:      Comments: Neck  General: No asymmetry, bruising, or erythema appreciated  ROM: FROM flex/lateral bend/lateral rotation, neck extension limited by " pain  Palpation: No TTP of spinous processes, no step-offs appreciated, +TTP of paraspinal muscles in thoracic and lower cervical region  Special tests: +Spurling's on left with pain in the left neck and into the left shoulder  Neuro: Sensation intact and equal bilaterally in UE's  Vascular: Radial pulse 2+    Neurological:      Mental Status: She is alert.     Assessment/Plan:   Assessment    1. Cervical radiculopathy    Patient with cervical radiculopathy.  Her symptoms are reproduced with Spurling's and with active neck extension.  Reviewed supportive care measures and recommended starting to work on stretching, heat in the neck area, and avoidance of positions which put pressure on the neck.  Advised that prednisone can be helpful for these sorts of processes, however need to be very careful with her other chronic medical problems.  Recommended against starting prednisone right away and would prefer to start supportive care measures only first.

## 2023-08-22 ENCOUNTER — TELEPHONE (OUTPATIENT)
Dept: HEALTH INFORMATION MANAGEMENT | Facility: OTHER | Age: 78
End: 2023-08-22

## 2023-09-22 PROBLEM — H91.90 HEARING LOSS: Status: ACTIVE | Noted: 2020-02-05

## 2023-09-22 PROBLEM — E11.40 NEUROPATHY DUE TO TYPE 2 DIABETES MELLITUS (HCC): Status: ACTIVE | Noted: 2019-09-06

## 2024-08-26 ENCOUNTER — PATIENT MESSAGE (OUTPATIENT)
Dept: HEALTH INFORMATION MANAGEMENT | Facility: OTHER | Age: 79
End: 2024-08-26

## 2024-10-09 ENCOUNTER — TELEPHONE (OUTPATIENT)
Dept: HEALTH INFORMATION MANAGEMENT | Facility: OTHER | Age: 79
End: 2024-10-09
Payer: MEDICARE

## 2025-02-17 ENCOUNTER — PATIENT MESSAGE (OUTPATIENT)
Dept: HEALTH INFORMATION MANAGEMENT | Facility: OTHER | Age: 80
End: 2025-02-17

## 2025-03-03 ENCOUNTER — HOSPITAL ENCOUNTER (EMERGENCY)
Facility: MEDICAL CENTER | Age: 80
End: 2025-03-04
Attending: STUDENT IN AN ORGANIZED HEALTH CARE EDUCATION/TRAINING PROGRAM
Payer: MEDICARE

## 2025-03-03 ENCOUNTER — APPOINTMENT (OUTPATIENT)
Dept: RADIOLOGY | Facility: MEDICAL CENTER | Age: 80
End: 2025-03-03
Attending: STUDENT IN AN ORGANIZED HEALTH CARE EDUCATION/TRAINING PROGRAM
Payer: MEDICARE

## 2025-03-03 DIAGNOSIS — I10 HYPERTENSION, UNSPECIFIED TYPE: ICD-10-CM

## 2025-03-03 DIAGNOSIS — R07.9 CHEST PAIN, UNSPECIFIED TYPE: Primary | ICD-10-CM

## 2025-03-03 LAB
ALBUMIN SERPL BCP-MCNC: 3.8 G/DL (ref 3.2–4.9)
ALBUMIN/GLOB SERPL: 1.3 G/DL
ALP SERPL-CCNC: 137 U/L (ref 30–99)
ALT SERPL-CCNC: 11 U/L (ref 2–50)
ANION GAP SERPL CALC-SCNC: 11 MMOL/L (ref 7–16)
AST SERPL-CCNC: 25 U/L (ref 12–45)
BASOPHILS # BLD AUTO: 0.6 % (ref 0–1.8)
BASOPHILS # BLD: 0.03 K/UL (ref 0–0.12)
BILIRUB SERPL-MCNC: 0.3 MG/DL (ref 0.1–1.5)
BUN SERPL-MCNC: 32 MG/DL (ref 8–22)
CALCIUM ALBUM COR SERPL-MCNC: 8.9 MG/DL (ref 8.5–10.5)
CALCIUM SERPL-MCNC: 8.7 MG/DL (ref 8.5–10.5)
CHLORIDE SERPL-SCNC: 107 MMOL/L (ref 96–112)
CO2 SERPL-SCNC: 18 MMOL/L (ref 20–33)
CREAT SERPL-MCNC: 1.91 MG/DL (ref 0.5–1.4)
EKG IMPRESSION: NORMAL
EOSINOPHIL # BLD AUTO: 0.14 K/UL (ref 0–0.51)
EOSINOPHIL NFR BLD: 3 % (ref 0–6.9)
ERYTHROCYTE [DISTWIDTH] IN BLOOD BY AUTOMATED COUNT: 47.3 FL (ref 35.9–50)
GFR SERPLBLD CREATININE-BSD FMLA CKD-EPI: 26 ML/MIN/1.73 M 2
GLOBULIN SER CALC-MCNC: 3 G/DL (ref 1.9–3.5)
GLUCOSE SERPL-MCNC: 151 MG/DL (ref 65–99)
HCT VFR BLD AUTO: 35.5 % (ref 37–47)
HGB BLD-MCNC: 11.3 G/DL (ref 12–16)
IMM GRANULOCYTES # BLD AUTO: 0.01 K/UL (ref 0–0.11)
IMM GRANULOCYTES NFR BLD AUTO: 0.2 % (ref 0–0.9)
LYMPHOCYTES # BLD AUTO: 1.38 K/UL (ref 1–4.8)
LYMPHOCYTES NFR BLD: 29.2 % (ref 22–41)
MCH RBC QN AUTO: 31 PG (ref 27–33)
MCHC RBC AUTO-ENTMCNC: 31.8 G/DL (ref 32.2–35.5)
MCV RBC AUTO: 97.3 FL (ref 81.4–97.8)
MONOCYTES # BLD AUTO: 0.42 K/UL (ref 0–0.85)
MONOCYTES NFR BLD AUTO: 8.9 % (ref 0–13.4)
NEUTROPHILS # BLD AUTO: 2.74 K/UL (ref 1.82–7.42)
NEUTROPHILS NFR BLD: 58.1 % (ref 44–72)
NRBC # BLD AUTO: 0 K/UL
NRBC BLD-RTO: 0 /100 WBC (ref 0–0.2)
NT-PROBNP SERPL IA-MCNC: 410 PG/ML (ref 0–125)
PLATELET # BLD AUTO: 141 K/UL (ref 164–446)
PMV BLD AUTO: 10.7 FL (ref 9–12.9)
POTASSIUM SERPL-SCNC: 5.3 MMOL/L (ref 3.6–5.5)
PROT SERPL-MCNC: 6.8 G/DL (ref 6–8.2)
RBC # BLD AUTO: 3.65 M/UL (ref 4.2–5.4)
SODIUM SERPL-SCNC: 136 MMOL/L (ref 135–145)
TROPONIN T SERPL-MCNC: 19 NG/L (ref 6–19)
WBC # BLD AUTO: 4.7 K/UL (ref 4.8–10.8)

## 2025-03-03 PROCEDURE — 36415 COLL VENOUS BLD VENIPUNCTURE: CPT

## 2025-03-03 PROCEDURE — 84484 ASSAY OF TROPONIN QUANT: CPT

## 2025-03-03 PROCEDURE — 80053 COMPREHEN METABOLIC PANEL: CPT

## 2025-03-03 PROCEDURE — 85025 COMPLETE CBC W/AUTO DIFF WBC: CPT

## 2025-03-03 PROCEDURE — 99285 EMERGENCY DEPT VISIT HI MDM: CPT

## 2025-03-03 PROCEDURE — 93005 ELECTROCARDIOGRAM TRACING: CPT | Mod: TC

## 2025-03-03 PROCEDURE — 93005 ELECTROCARDIOGRAM TRACING: CPT | Mod: TC | Performed by: STUDENT IN AN ORGANIZED HEALTH CARE EDUCATION/TRAINING PROGRAM

## 2025-03-03 PROCEDURE — 83880 ASSAY OF NATRIURETIC PEPTIDE: CPT

## 2025-03-03 ASSESSMENT — PAIN DESCRIPTION - DESCRIPTORS: DESCRIPTORS: ACHING

## 2025-03-04 VITALS
WEIGHT: 160 LBS | HEART RATE: 66 BPM | TEMPERATURE: 97.5 F | HEIGHT: 62 IN | SYSTOLIC BLOOD PRESSURE: 207 MMHG | DIASTOLIC BLOOD PRESSURE: 81 MMHG | BODY MASS INDEX: 29.44 KG/M2 | RESPIRATION RATE: 19 BRPM | OXYGEN SATURATION: 95 %

## 2025-03-04 LAB — TROPONIN T SERPL-MCNC: 19 NG/L (ref 6–19)

## 2025-03-04 PROCEDURE — 84484 ASSAY OF TROPONIN QUANT: CPT

## 2025-03-04 PROCEDURE — 71045 X-RAY EXAM CHEST 1 VIEW: CPT

## 2025-03-04 ASSESSMENT — HEART SCORE
ECG: NON-SPECIFIC REPOLARIZATION DISTURBANCE
RISK FACTORS: >2 RISK FACTORS OR HX OF ATHEROSCLEROTIC DISEASE
AGE: 65+
TROPONIN: LESS THAN OR EQUAL TO NORMAL LIMIT
HEART SCORE: 5
HISTORY: SLIGHTLY SUSPICIOUS

## 2025-03-04 NOTE — ED TRIAGE NOTES
Chief Complaint   Patient presents with    Chest Pain     Intermittent chest pain and dizziness started today while sitting in a chair 8/10 pain at the time, 0/10 pain currently     Dizziness     Vitals:    03/03/25 2158   BP: (!) 205/78   Pulse:    Resp:    Temp:    SpO2:      Patient ambulatory to triage for above complaint. Patient A&Ox4, GCS 15, patient speaking in full sentences. Equal and unlabored respirations. Patient educated on triage process and encouraged to notify staff if condition worsens. Appropriate protocols ordered. Patient returned to the lobby in stable condition.

## 2025-03-04 NOTE — ED PROVIDER NOTES
"ER Provider Note    Scribed for Tio Solano M.d. by Og Fisher. 3/3/2025  11:52 PM    Primary Care Provider: Brittni Zhou M.D.    CHIEF COMPLAINT   Chief Complaint   Patient presents with    Chest Pain     Intermittent chest pain and dizziness started today while sitting in a chair 8/10 pain at the time, 0/10 pain currently     Dizziness     EXTERNAL RECORDS REVIEWED  Review of inpatient records shows the patient was hospitalized in 2022. She has a history of CKD, bradycardia, hypertension, Type II Diabetes. She had an echocardiogram in 2022 that showed an ejection fraction of 60%, otherwise normal.     HPI/ROS  LIMITATION TO HISTORY   Select: : None  OUTSIDE HISTORIAN(S):  Family Son present at bedside.     Pam Heranndez is a 79 y.o. female who presents to the ED for evaluation of left sided \"stinging\" chest pain that does not radiate onset while sitting in a chair at home. She came to the ED after this episode resolved and had another episode of similar symptoms in the waiting room.  clarifies these episodes each lasted 25 minutes. The patient reports associated symptoms of lightheadedness during these episodes, but denies shortness of breath, nausea, numbness, paraesthesias, visual changes, lower extremity swelling, cough, abdominal pain, diarrhea.  She denies any recent illness. She notes being put on sodium bicarb 2-3 months ago. The patient also reports an episode around that time where she felt sick, had blurry vision, and couldn't get out of her chair. She says her nurse came to check on her during this episode which resolved. At that time she notes a blood pressure around 200 systolic. She has a history of Type II Diabetes, hypertension. She takes lisinopril but has not had her evening dose. She is not followed by cardiology currently, but was previously.     PAST MEDICAL HISTORY  Past Medical History:   Diagnosis Date    Acid reflux     Acute nasopharyngitis [id number]    Anemia     " Anesthesia     reports wants sleep, hard to wake up    Arthritis     fingers/hands    At risk for falling     Cataract     removed Dr.Stanko benjamín    Dental disorder     partial lower, plate upper    Diabetes     oral meds and insulin dependent    Elevated lipase 1/23/2019    Glaucoma     bilat    Hyperkalemia 1/23/2019    Hypertension     Hypothyroid 12/10/2018    on no meds at this time    Neuropathy (HCC)     Pneumonia     2015    Psychiatric problem     depression    Snoring     no sleep study    Urinary frequency     Urinary incontinence     wears a pad     SURGICAL HISTORY  Past Surgical History:   Procedure Laterality Date    MADELINE BY LAPAROSCOPY N/A 10/18/2019    Procedure: CHOLECYSTECTOMY, LAPAROSCOPIC;  Surgeon: Kathleen Pierson M.D.;  Location: SURGERY SAME DAY [facility];  Service: General    VITRECTOMY POSTERIOR Right 12/11/2018    Procedure: VITRECTOMY POSTERIOR-  LASER;  Surgeon: Katina Bolaños M.D.;  Location: SURGERY SAME DAY [facility];  Service: Ophthalmology    EYE SURGERY Left 2018    for glaucoma    CATARACT EXTRACTION WITH IOL Bilateral     US-NEEDLE CORE BX-BREAST PANEL       FAMILY HISTORY  Family History   Problem Relation Age of Onset    Coronary artery disease Mother        SOCIAL HISTORY   reports that she has never smoked. She has never used smokeless tobacco. She reports that she does not drink alcohol and does not use drugs.    CURRENT MEDICATIONS  Previous Medications    ACETAMINOPHEN (TYLENOL) 325 MG TAB        ASPIRIN LOW DOSE 81 MG EC TABLET        ATORVASTATIN (LIPITOR) 10 MG TAB        BRIMONIDINE TARTRATE-TIMOLOL 0.2-0.5 % SOLUTION    Administer 1 Drop into both eyes 2 times a day.    CARBOXYMETHYLCELLULOSE SOD PF 0.5 % SOLUTION    Administer 1 Drop into both eyes every 2 hours while awake.    CHOLECALCIFEROL (VITAMIN D3) 50 MCG (2000 UT) TAB        CONTINUOUS BLOOD GLUC SENSOR (FREESTYLE ADAL 2 SENSOR) MISC        CYANOCOBALAMIN (VITAMIN B-12) 500 MCG TAB         "DICLOFENAC SODIUM (VOLTAREN) 1 % GEL        DROPLET PEN NEEDLES        FERROUS SULFATE 325 (65 FE) MG TABLET    Take 162.5 mg by mouth every day. 162.5 mg = 1/2 tablet    FUROSEMIDE (LASIX) 20 MG TAB    Take 20 mg by mouth 1 time a day as needed (For leg swelling).    GABAPENTIN (NEURONTIN) 100 MG CAP    Take 300 mg by mouth every day.    INSULIN PEN NEEDLE 32 G X 4 MM    Use to inject insulin daily as directed    LANTUS SOLOSTAR 100 UNIT/ML SOLUTION PEN-INJECTOR INJECTION        LATANOPROST (XALATAN) 0.005 % SOLUTION    Place 1 Drop in both eyes every evening.    LISINOPRIL (PRINIVIL) 10 MG TAB        PIOGLITAZONE (ACTOS) 30 MG TAB        SYRINGE (BD PLASTIPAK SYRINGE) 3 ML MISC    use to draw B12 injection use as directed by MD LONGO METRIX BLOOD GLUCOSE TEST STRIP         ALLERGIES  Epinephrine, Nalbuphine, Novocain, Pseudoephedrine, and Levofloxacin    PHYSICAL EXAM  BP (!) 205/78   Pulse 73   Temp 36.4 °C (97.5 °F) (Temporal)   Resp 16   Ht 1.575 m (5' 2\")   Wt 72.6 kg (160 lb)   LMP  (LMP Unknown)   SpO2 96%   BMI 29.26 kg/m²   Constitutional: Well developed, Well nourished, No acute distress, Non-toxic appearance.   HENT: Normocephalic, Atraumatic, Bilateral external ears normal, Oropharynx is clear mucous membranes are moist. No oral exudates or nasal discharge.   Neck: Normal range of motion, No tenderness, Supple, No stridor. No meningismus.  Lymphatic: No lymphadenopathy noted.   Cardiovascular: Regular rate and rhythm without murmur rub or gallop.  Thorax & Lungs: Clear breath sounds bilaterally without wheezes, rhonchi or rales. There is no chest wall tenderness.   Abdomen: Soft non-tender non-distended. There is no rebound or guarding. No organomegaly is appreciated. Bowel sounds are normal.  Skin: Normal without rash.   Back: No CVA or spinal tenderness.   Extremities: Intact distal pulses, No edema, No tenderness, No cyanosis, No clubbing. Capillary refill is less than 2 " seconds.  Musculoskeletal: Good range of motion in all major joints. No tenderness to palpation or major deformities noted.   Neurologic: Alert & oriented x 3, Normal motor function, Normal sensory function, No focal deficits noted. Reflexes are normal.  Psychiatric: Affect normal, Judgment normal, Mood normal.     DIAGNOSTIC STUDIES    EKG/LABS  Results for orders placed or performed during the hospital encounter of 25   EKG    Collection Time: 25 10:02 PM   Result Value Ref Range    Report       [facility] Emergency Dept.    Test Date:  2025  Pt Name:    OLIVIA JOYCE                  Department: ER  MRN:        [id number]                      Room:  Gender:     Female                       Technician: [id number]  :        1945                   Requested By:ER TRIAGE PROTOCOL  Order #:    [id number]                    Reading MD:    Measurements  Intervals                                Axis  Rate:       67                           P:          58  SD:         161                          QRS:        69  QRSD:       142                          T:          -2  QT:         445  QTc:        470    Interpretive Statements  Sinus rhythm  Right bundle branch block  Compared to ECG 2022 13:57:30  Right bundle-branch block now present  Sinus bradycardia no longer present     CBC with Differential    Collection Time: 25 10:24 PM   Result Value Ref Range    WBC 4.7 (L) 4.8 - 10.8 K/uL    RBC 3.65 (L) 4.20 - 5.40 M/uL    Hemoglobin 11.3 (L) 12.0 - 16.0 g/dL    Hematocrit 35.5 (L) 37.0 - 47.0 %    MCV 97.3 81.4 - 97.8 fL    MCH 31.0 27.0 - 33.0 pg    MCHC 31.8 (L) 32.2 - 35.5 g/dL    RDW 47.3 35.9 - 50.0 fL    Platelet Count 141 (L) 164 - 446 K/uL    MPV 10.7 9.0 - 12.9 fL    Neutrophils-Polys 58.10 44.00 - 72.00 %    Lymphocytes 29.20 22.00 - 41.00 %    Monocytes 8.90 0.00 - 13.40 %    Eosinophils 3.00 0.00 - 6.90 %    Basophils 0.60 0.00 - 1.80 %    Immature Granulocytes  0.20 0.00 - 0.90 %    Nucleated RBC 0.00 0.00 - 0.20 /100 WBC    Neutrophils (Absolute) 2.74 1.82 - 7.42 K/uL    Lymphs (Absolute) 1.38 1.00 - 4.80 K/uL    Monos (Absolute) 0.42 0.00 - 0.85 K/uL    Eos (Absolute) 0.14 0.00 - 0.51 K/uL    Baso (Absolute) 0.03 0.00 - 0.12 K/uL    Immature Granulocytes (abs) 0.01 0.00 - 0.11 K/uL    NRBC (Absolute) 0.00 K/uL   Complete Metabolic Panel (CMP)    Collection Time: 03/03/25 10:24 PM   Result Value Ref Range    Sodium 136 135 - 145 mmol/L    Potassium 5.3 3.6 - 5.5 mmol/L    Chloride 107 96 - 112 mmol/L    Co2 18 (L) 20 - 33 mmol/L    Anion Gap 11.0 7.0 - 16.0    Glucose 151 (H) 65 - 99 mg/dL    Bun 32 (H) 8 - 22 mg/dL    Creatinine 1.91 (H) 0.50 - 1.40 mg/dL    Calcium 8.7 8.5 - 10.5 mg/dL    Correct Calcium 8.9 8.5 - 10.5 mg/dL    AST(SGOT) 25 12 - 45 U/L    ALT(SGPT) 11 2 - 50 U/L    Alkaline Phosphatase 137 (H) 30 - 99 U/L    Total Bilirubin 0.3 0.1 - 1.5 mg/dL    Albumin 3.8 3.2 - 4.9 g/dL    Total Protein 6.8 6.0 - 8.2 g/dL    Globulin 3.0 1.9 - 3.5 g/dL    A-G Ratio 1.3 g/dL   proBrain Natriuretic Peptide, NT (BNP)    Collection Time: 03/03/25 10:24 PM   Result Value Ref Range    NT-proBNP 410 (H) 0 - 125 pg/mL   Troponins NOW    Collection Time: 03/03/25 10:24 PM   Result Value Ref Range    Troponin T 19 6 - 19 ng/L   ESTIMATED GFR    Collection Time: 03/03/25 10:24 PM   Result Value Ref Range    GFR (CKD-EPI) 26 (A) >60 mL/min/1.73 m 2   Troponins in two (2) hours    Collection Time: 03/04/25  1:24 AM   Result Value Ref Range    Troponin T 19 6 - 19 ng/L      I have independently interpreted this EKG    RADIOLOGY/PROCEDURES   The attending emergency physician has independently interpreted the diagnostic imaging associated with this visit and am waiting the final reading from the radiologist.   My preliminary interpretation is a follows: Dx-chest: No pneumothorax, some cardiomegaly with no pleural effusions,no focal consolidation.     Radiologist  "interpretation:  DX-CHEST-PORTABLE (1 VIEW)   Final Result      1.  Mild diffuse interstitial prominence which could be seen in the setting of edema and/or infection.   2.  Mild enlargement of the cardiomediastinal silhouette.        COURSE & MEDICAL DECISION MAKING     ASSESSMENT, COURSE AND PLAN  Care Narrative:     12:02 AM - Patient seen and examined at bedside. This is an evaluation of a  79 y.o. woman who presents after two episodes of non-radiating \"stinging\" left chest pain tonight.  No associated shortness of breath, nausea, diaphoresis.  Symptoms last several seconds and spontaneously resolved.  No recent infections.  Here patient does not appear overtly fluid overloaded, no distress, pain is not reproducible on exam.  ProBNP, cbc with diff, cmp, dx-chest, EKG, and troponin ordered per nursing protocol at triage. Discussed plan of care, including reevaluation following review of pending troponin and chest x-ray formal read . Patient agrees to the plan of care.      EKG shows new bundle branch block, no other evidence of ischemic changes.  Previous EKG approximately 5 years ago.  Labs reviewed showed some leukopenia, chronic anemia.  Metabolic panel shows bicarb of 18, no elevated anion gap, creatinine consistent with baseline.  Troponin negative x 2, BNP mildly elevated at 400.    This x-ray does not show any signs of focal consolidation or pneumonia.  Patient was mildly hypertensive here, states that she has been compliant with her medications, normal blood pressures approximately 1 5160, patient denying any chest pain here and was observed for several hours.    Patient heart score mildly elevated at 5, does not have cardiology follow-up.  Shared decision making was made with the patient who wishes to go home and follow-up outpatient with cardiologist.  Due to patient's history of present illness, exam, EKG, labs and imaging I feel like this is reasonable at this time.  Given strict return precautions " anticipatory guidance which she understood time of discharge.  Placed referral and ED follow-up to cardiology for patient.  Patient is on a daily aspirin    2:24 AM - The patient was reevaluated at bedside. Informed the patient of normal second troponin. The patient has had no chest pain. I then informed the patient of my plan for discharge, which includes referral to cardiology and strict return precautions for any new or worsening symptoms. Patient understands and verbalizes agreement to plan of care. Patient is comfortable going home at this time.       CHEST PAIN:   HEART Score for Major Cardiac Events  HEART Score     History: Slightly suspicious  ECG: Non-specific repolarization disturbance  Age: 65+  Risk Factors: >2 risk factors or hx of atherosclerotic disease  Troponin: Less than or equal to normal limit    Heart Score: 5    Total Score   0-3 Points = Low Score, risk of MACE 0.9-1.7%.  4-6 Points = Moderate Score, risk of MACE 12-16.6%  7-10 Points = High Score, risk of MACE 50-65%    ADDITIONAL PROBLEM LIST  Chest pain    DISPOSITION AND DISCUSSIONS  I have discussed management of the patient with the following physicians and JAVON's:  None    Discussion of management with other QHP or appropriate source(s): None     Escalation of care considered, and ultimately not performed: acute inpatient care management, however at this time, the patient is most appropriate for outpatient management.    Barriers to care at this time, including but not limited to:  None known .     Decision tools and prescription drugs considered including, but not limited to:  Heart Score: 5 .    The patient will return for new or worsening symptoms and is stable at the time of discharge.    The patient is referred to a primary physician for blood pressure management, diabetic screening, and for all other preventative health concerns.    DISPOSITION:  Patient will be discharged home in stable condition.    FOLLOW UP:  Brittni Zhou,  M.D.  [address] [facility] [address]  [phone]    Schedule an appointment as soon as possible for a visit     OUTPATIENT MEDICATIONS:  Discharge Medication List as of 3/4/2025  2:43 AM        FINAL DIANGOSIS  1. Chest pain, unspecified type Acute   2. Hypertension, unspecified type Active       Og CARLSON (Lila), am scribing for, and in the presence of, Tio Solano M.D..    Electronically signed by: Og Fisher (Lila), 3/4/2025    Tio CARLSON M.D. personally performed the services described in this documentation, as scribed by Og Fisher in my presence, and it is both accurate and complete.      The note accurately reflects work and decisions made by me.  Tio Solaon M.D.  3/4/2025  5:54 AM

## 2025-03-04 NOTE — DISCHARGE INSTRUCTIONS
You are seen and evaluated emergency department for your chest pain.  There is no signs of any ongoing heart damage, your labs and x-ray showed that you have may be holding onto a little more fluid but with this was not causing you any respiratory distress and he did not appear overtly fluid overloaded on exam.  Your EKG here did show some changes before the previous when compared to 5 years ago.  There is no signs of any ongoing heart attack.  We had shared decision-making about inpatient versus outpatient management you have elected to follow-up on an outpatient basis.  I have placed a referral for you to establish with a cardiologist, if you have not heard anything in 24 to 48 hours please call.  If you carries any worsening symptoms, persistent symptoms please return to the emergency room for further evaluation otherwise follow-up with your specialist.

## 2025-03-04 NOTE — ED NOTES
"Pam Hernandez has been discharged from the Emergency Room.    Pt in possession of belongings.    Discharge instructions, which include signs and symptoms to monitor patient for, as well as detailed information regarding chest pain provided.  Patient verbalizes understanding of follow up care and medication management. All questions and concerns addressed at this time.     Patient provided with education on when to return to the ER and verbally understands with no concerns. Patient advised on setting up MyChart and information provided about patient survey.  Patient leaves ER in no apparent distress. This RN provided education regarding returning to the ER for any new concerns or changes in patient's condition.      BP (!) 207/81   Pulse 66   Temp 36.4 °C (97.5 °F) (Temporal)   Resp 19   Ht 1.575 m (5' 2\")   Wt 72.6 kg (160 lb)   LMP  (LMP Unknown)   SpO2 95%   BMI 29.26 kg/m²    "

## 2025-03-19 ENCOUNTER — OFFICE VISIT (OUTPATIENT)
Dept: CARDIOLOGY | Facility: MEDICAL CENTER | Age: 80
End: 2025-03-19
Attending: NURSE PRACTITIONER
Payer: MEDICARE

## 2025-03-19 VITALS
HEIGHT: 62 IN | RESPIRATION RATE: 18 BRPM | BODY MASS INDEX: 30 KG/M2 | DIASTOLIC BLOOD PRESSURE: 58 MMHG | OXYGEN SATURATION: 98 % | SYSTOLIC BLOOD PRESSURE: 160 MMHG | HEART RATE: 64 BPM | WEIGHT: 163 LBS

## 2025-03-19 DIAGNOSIS — I63.9 CEREBROVASCULAR ACCIDENT (CVA), UNSPECIFIED MECHANISM (HCC): Chronic | ICD-10-CM

## 2025-03-19 DIAGNOSIS — I10 ESSENTIAL HYPERTENSION, BENIGN: ICD-10-CM

## 2025-03-19 DIAGNOSIS — R79.89 ELEVATED BRAIN NATRIURETIC PEPTIDE (BNP) LEVEL: ICD-10-CM

## 2025-03-19 DIAGNOSIS — Z79.899 HIGH RISK MEDICATION USE: ICD-10-CM

## 2025-03-19 DIAGNOSIS — E78.5 DYSLIPIDEMIA: ICD-10-CM

## 2025-03-19 DIAGNOSIS — R07.89 OTHER CHEST PAIN: Chronic | ICD-10-CM

## 2025-03-19 PROBLEM — I13.0 HYPERTENSIVE HEART AND CHRONIC KIDNEY DISEASE WITH HEART FAILURE AND STAGE 1 THROUGH STAGE 4 CHRONIC KIDNEY DISEASE, OR UNSPECIFIED CHRONIC KIDNEY DISEASE (HCC): Status: ACTIVE | Noted: 2025-03-19

## 2025-03-19 PROCEDURE — 99213 OFFICE O/P EST LOW 20 MIN: CPT | Performed by: NURSE PRACTITIONER

## 2025-03-19 PROCEDURE — 3078F DIAST BP <80 MM HG: CPT | Performed by: NURSE PRACTITIONER

## 2025-03-19 PROCEDURE — 99214 OFFICE O/P EST MOD 30 MIN: CPT | Performed by: NURSE PRACTITIONER

## 2025-03-19 PROCEDURE — 3077F SYST BP >= 140 MM HG: CPT | Performed by: NURSE PRACTITIONER

## 2025-03-19 PROCEDURE — 93005 ELECTROCARDIOGRAM TRACING: CPT | Mod: TC | Performed by: NURSE PRACTITIONER

## 2025-03-19 RX ORDER — LISINOPRIL 10 MG/1
10 TABLET ORAL 2 TIMES DAILY
Qty: 200 TABLET | Refills: 3 | Status: SHIPPED | OUTPATIENT
Start: 2025-03-19

## 2025-03-19 ASSESSMENT — FIBROSIS 4 INDEX: FIB4 SCORE: 4.22

## 2025-03-19 NOTE — PROGRESS NOTES
Chief Complaint   Patient presents with    Chest Pain       Subjective     Pam Hernandez is a 79 y.o. female who presents today for chest pain follow-up after being evaluated in the ER on 3/3/2025.  Patient has additional medical problems of hypertension, diabetes, hyperlipidemia, stroke.    Previous patient of Dr. Flores, she was last seen on 9/28/2022.    Today, patient reports no further events of chest pain.  She described initially as sharp/stinging pain in her left upper chest.  No exacerbating or alleviating factors.  Patient notes sensitivity to medications in the past with near syncopal event.  She is unsure of the medication name that she had the reaction to.  Otherwise, Patient denies shortness of breath, palpitations, orthopnea, PND or Edema.     EKG in office today personally interpreted as sinus rhythm with RBBB, LAFB.  No acute ST changes.    Blood pressure is elevated in office today.  Patient reports blood pressure at home 160's systolic.     Patient previously tolerated lisinopril 10 mg twice daily, we will return patient was dose.  We discussed elevated BNP during ER evaluation.  Will obtain echocardiogram. Will refer patient to pharmacotherapy clinic to optimize antihypertensive regimen.  Patient to follow-up in 6 weeks, sooner if needed.    Past Medical History:   Diagnosis Date    Acid reflux     Acute nasopharyngitis [id number]    Anemia     Anesthesia     reports wants sleep, hard to wake up    Arthritis     fingers/hands    At risk for falling     Cataract     removed Dr.Stanko benjamín    Dental disorder     partial lower, plate upper    Diabetes     oral meds and insulin dependent    Elevated lipase 1/23/2019    Glaucoma     bilat    Hyperkalemia 1/23/2019    Hypertension     Hypothyroid 12/10/2018    on no meds at this time    Neuropathy (HCC)     Pneumonia     2015    Psychiatric problem     depression    Snoring     no sleep study    Urinary frequency     Urinary incontinence     wears  a pad     Past Surgical History:   Procedure Laterality Date    MADELINE BY LAPAROSCOPY N/A 10/18/2019    Procedure: CHOLECYSTECTOMY, LAPAROSCOPIC;  Surgeon: Kathleen Pierson M.D.;  Location: SURGERY SAME DAY [facility];  Service: General    VITRECTOMY POSTERIOR Right 12/11/2018    Procedure: VITRECTOMY POSTERIOR-  LASER;  Surgeon: Katina Bolaños M.D.;  Location: SURGERY SAME DAY [facility];  Service: Ophthalmology    EYE SURGERY Left 2018    for glaucoma    CATARACT EXTRACTION WITH IOL Bilateral     US-NEEDLE CORE BX-BREAST PANEL       Family History   Problem Relation Age of Onset    Coronary artery disease Mother      Social History     Socioeconomic History    Marital status:      Spouse name: Not on file    Number of children: Not on file    Years of education: Not on file    Highest education level: Not on file   Occupational History    Not on file   Tobacco Use    Smoking status: Never    Smokeless tobacco: Never   Vaping Use    Vaping status: Never Used   Substance and Sexual Activity    Alcohol use: No    Drug use: No    Sexual activity: Not on file   Other Topics Concern    Not on file   Social History Narrative    Not on file     Social Drivers of Health     Financial Resource Strain: Not on file   Food Insecurity: Not on file   Transportation Needs: Not on file   Physical Activity: Not on file   Stress: Not on file   Social Connections: Not on file   Intimate Partner Violence: Not on file   Housing Stability: Not on file     Allergies   Allergen Reactions    Epinephrine      Other reaction(s): Dizziness    Nalbuphine      Other reaction(s): Depression    Novocain Anaphylaxis    Pseudoephedrine      Caused depression.  Other reaction(s): Depression    Levofloxacin      Other reaction(s): Pruritic rash     Outpatient Encounter Medications as of 3/19/2025   Medication Sig Dispense Refill    lisinopril (PRINIVIL) 10 MG Tab Take 1 Tablet by mouth 2 times a day. 200 Tablet 3    ASPIRIN LOW DOSE 81  "MG EC tablet       Continuous Blood Gluc Sensor (FREESTYLE ADAL 2 SENSOR) Misc       LANTUS SOLOSTAR 100 UNIT/ML Solution Pen-injector injection Inject 10 Units under the skin every evening.      pioglitazone (ACTOS) 30 MG Tab Take 30 mg by mouth every day.      cyanocobalamin (VITAMIN B-12) 500 MCG Tab       Cholecalciferol (VITAMIN D3) 50 MCG (2000 UT) Tab       acetaminophen (TYLENOL) 325 MG Tab       diclofenac sodium (VOLTAREN) 1 % Gel       atorvastatin (LIPITOR) 10 MG Tab       TRUE METRIX BLOOD GLUCOSE TEST strip       DROPLET PEN NEEDLES       Insulin Pen Needle 32 G x 4 mm Use to inject insulin daily as directed 100 Each 0    Syringe (BD PLASTIPAK SYRINGE) 3 ML Misc use to draw B12 injection use as directed by MD 11 Each 3    Carboxymethylcellulose Sod PF 0.5 % Solution Administer 1 Drop into both eyes every 2 hours while awake.      furosemide (LASIX) 20 MG Tab Take 20 mg by mouth 1 time a day as needed (For leg swelling).      Brimonidine Tartrate-Timolol 0.2-0.5 % Solution Administer 1 Drop into both eyes 2 times a day.      gabapentin (NEURONTIN) 100 MG Cap Take 300 mg by mouth every day.      latanoprost (XALATAN) 0.005 % Solution Place 1 Drop in both eyes every evening.      [DISCONTINUED] lisinopril (PRINIVIL) 10 MG Tab Take 10 mg by mouth every day.      ferrous sulfate 325 (65 Fe) MG tablet Take 162.5 mg by mouth every day. 162.5 mg = 1/2 tablet (Patient not taking: Reported on 3/19/2025)       No facility-administered encounter medications on file as of 3/19/2025.     ROS Complete review of systems negative except as noted in HPI/subjective             Objective     BP (!) 160/58 (BP Location: Left arm, Patient Position: Sitting, BP Cuff Size: Adult)   Pulse 64   Resp 18   Ht 1.575 m (5' 2\")   Wt 73.9 kg (163 lb)   LMP  (LMP Unknown)   SpO2 98%   BMI 29.81 kg/m²     Physical Exam  Vitals reviewed.   Constitutional:       Appearance: She is well-developed.   HENT:      Head: Normocephalic " and atraumatic.   Eyes:      Pupils: Pupils are equal, round, and reactive to light.   Neck:      Vascular: No carotid bruit or JVD.   Cardiovascular:      Rate and Rhythm: Normal rate and regular rhythm.      Heart sounds: Normal heart sounds. No murmur heard.     No friction rub. No gallop.   Pulmonary:      Effort: Pulmonary effort is normal. No respiratory distress.      Breath sounds: Normal breath sounds.   Abdominal:      General: Bowel sounds are normal. There is no distension.      Palpations: Abdomen is soft.   Musculoskeletal:      Right lower leg: No edema.      Left lower leg: No edema.   Skin:     General: Skin is warm and dry.      Findings: No erythema.   Neurological:      Mental Status: She is alert and oriented to person, place, and time.   Psychiatric:         Behavior: Behavior normal.       Lab Results   Component Value Date/Time    CHOLSTRLTOT 176 09/11/2022 01:11 AM     (H) 09/11/2022 01:11 AM    HDL 41 09/11/2022 01:11 AM    TRIGLYCERIDE 113 09/11/2022 01:11 AM       Lab Results   Component Value Date/Time    SODIUM 136 03/03/2025 10:24 PM    POTASSIUM 5.3 03/03/2025 10:24 PM    CHLORIDE 107 03/03/2025 10:24 PM    CO2 18 (L) 03/03/2025 10:24 PM    GLUCOSE 151 (H) 03/03/2025 10:24 PM    BUN 32 (H) 03/03/2025 10:24 PM    CREATININE 1.91 (H) 03/03/2025 10:24 PM    CREATININE 0.9 08/09/2007 04:52 AM     Lab Results   Component Value Date/Time    ALKPHOSPHAT 137 (H) 03/03/2025 10:24 PM    ASTSGOT 25 03/03/2025 10:24 PM    ALTSGPT 11 03/03/2025 10:24 PM    TBILIRUBIN 0.3 03/03/2025 10:24 PM       Latest Reference Range & Units 03/03/25 22:24   NT-proBNP 0 - 125 pg/mL 410 (H)     TTE (9/10/2022):  Normal left ventricular systolic function. The left ventricular   ejection fraction is visually estimated to be 60%.   Grade I diastolic dysfunction.  The right ventricle is normal in size and systolic function.  No significant valvular abnormalities.   No recent study for comparison.             Assessment & Plan     1. Other chest pain  EKG    lisinopril (PRINIVIL) 10 MG Tab    EC-ECHOCARDIOGRAM COMPLETE W/O CONT    Referral to Pharmacotherapy Service    Basic Metabolic Panel      2. Elevated brain natriuretic peptide (BNP) level  lisinopril (PRINIVIL) 10 MG Tab    EC-ECHOCARDIOGRAM COMPLETE W/O CONT    Referral to Pharmacotherapy Service    Basic Metabolic Panel      3. Cerebrovascular accident (CVA), unspecified mechanism (HCC)  lisinopril (PRINIVIL) 10 MG Tab    EC-ECHOCARDIOGRAM COMPLETE W/O CONT    Referral to Pharmacotherapy Service    Basic Metabolic Panel      4. Dyslipidemia        5. Essential hypertension, benign        6. High risk medication use            Medical Decision Making: Today's Assessment/Status/Plan:          Hypertension; Elevated BNP, High risk medication use  -atypical chest pain, discussed likely musculoskeletal.  Will reevaluate symptoms and consider stress testing once BP controll pain ed  -Increase lisinopril to 10 mg twice daily  -Refer to pharmacotherapy clinic to optimize blood pressure less than 130/80.  Home blood pressure monitoring instructions provided on after visit summary  -Check an echo  -BMP in 2 weeks    FU in clinic in 6 weeks with cardiology. Sooner if needed.    Patient verbalizes understanding and agrees with the plan of care.         GERONIMO Gan, CCK, HF-Cert   [facility]  [phone]    PLEASE NOTE: This Note was created using voice recognition Software. I have made every reasonable attempt to correct obvious errors, but I expect that there are errors of grammar and possibly content that I did not discover before finalizing the note

## 2025-03-19 NOTE — PATIENT INSTRUCTIONS
Checking Blood Pressure:  -Put the cuff in place, rest arm on table near height of your heart, sit quietly for 5 min, legs uncrossed, with back support, then take your blood pressure, write it down, keep a log  -Check once a day. Ideally, 2 hours after medications.  -Can bring your cuff to at least one appointment where it can be calibrated to a manual cuff if you are concerned.  -Goal blood pressure is at least under 130/80, ideally under 120/80.  If you think your BP is overall too high, let us know in the office, we can adjust medications, can use [employer] or call the [employer] office: [phone].  -If you feel dizzy, please check your blood pressure.  If your blood pressure is less than 90/60 with dizziness call our office at [phone].    -Continue to monitor blood pressures, heart rates, and daily weights in log provided in office today. Please bring to next appointment to review with provider.     Salt=sodium=sea salt, guidelines say stay under 2,500 mg daily   Get salt smart, start looking at labels, count it up.  Salt is hidden in everything, salad dressing, sauces, cheese, most canned food, any processed meat.

## 2025-03-20 LAB — EKG IMPRESSION: NORMAL

## 2025-03-20 PROCEDURE — 93010 ELECTROCARDIOGRAM REPORT: CPT | Performed by: STUDENT IN AN ORGANIZED HEALTH CARE EDUCATION/TRAINING PROGRAM

## 2025-03-31 ENCOUNTER — NON-PROVIDER VISIT (OUTPATIENT)
Dept: CARDIOLOGY | Facility: MEDICAL CENTER | Age: 80
End: 2025-03-31
Attending: NURSE PRACTITIONER
Payer: MEDICARE

## 2025-03-31 VITALS
SYSTOLIC BLOOD PRESSURE: 183 MMHG | DIASTOLIC BLOOD PRESSURE: 60 MMHG | WEIGHT: 160 LBS | HEART RATE: 65 BPM | BODY MASS INDEX: 29.26 KG/M2

## 2025-03-31 PROCEDURE — 99213 OFFICE O/P EST LOW 20 MIN: CPT

## 2025-03-31 ASSESSMENT — FIBROSIS 4 INDEX: FIB4 SCORE: 4.22

## 2025-03-31 NOTE — PROGRESS NOTES
Pharmacotherapy Hypertension Visit    Date Referral Placed: 3/19/25    Goal Date when HTN will be controlled: 7/1/25 (3 months from first visit)  -Refer to resistant HTN if not at goal in 3 months    Type of Visit:  Initial Visit    Pam Hernandez has been referred for evaluation and management of hypertension    HPI:   Vitals:    03/31/25 0755   BP: (!) 183/60   Pulse: 65   Weight: 72.6 kg (160 lb)     Both arms - in future use arm with higher reading    Age at Initial Diagnosis: 5-8 years ago (~72 years old)      Home BP readings:   196/85, 160/91, 169/69, 165/74, 132/63, 165/72, 166/77, 159/77, 166/75, 177/81  Any readings above  180/110:  One reading of 196/85 (day after increased Lisinopril dose).    Any symptoms of high blood pressure (TIA, Stroke, Head ache, vision changes): None      Current Prescription HTN Medications - including dose:    Lisinopril 10 mg BID  Lasix 20 mg PRN (swelling) - reports she has not been taking      Current side effects potentially related to antihypertensive medications (lightheaded, dizziness, leg swelling): Reports lower extremity edema (~2 weeks ago) treated with elevation     Current Adherence to Blood Pressure Medications: reports complete (utilizes a pill box and HH nurse fills pill box for her)     Previously attempted BP medications:   Reports one medication (pt unsure which) caused increased blood pressure    Any current interfering substances: (caffeine, NSAIDs, corticosteroids, etc)  Drinks 1 cup green tea every morning  Takes Ibuprofen every once in a while PRN    Any current lifestyle issues affecting BP:  None    In the past 6 months have pt had the following (if no, then order)  EKG  Microalbumin  Electrolytes and eGFR  TSH  CBC  Fasting lipid panel  Fasting glucose    Since last visit any of the below - No  Creatinine increase > 0.5  Potassium > 5 or < 3.5  New edema present  Hyponatremia    Lab Results   Component Value Date/Time    SODIUM 136 03/03/2025  10:24 PM    POTASSIUM 5.3 03/03/2025 10:24 PM    CHLORIDE 107 03/03/2025 10:24 PM    CO2 18 (L) 03/03/2025 10:24 PM    GLUCOSE 151 (H) 03/03/2025 10:24 PM    BUN 32 (H) 03/03/2025 10:24 PM    CREATININE 1.91 (H) 03/03/2025 10:24 PM    CREATININE 0.9 08/09/2007 04:52 AM        Medications Reconciled  CURRENT MEDICATIONS:   Current Outpatient Medications:     lisinopril, 10 mg, Oral, BID, Taking    Aspirin Low Dose, , Taking    FreeStyle Flaco 2 Sensor, , Taking    Lantus SoloStar, 10 Units, Subcutaneous, Q EVENING, Taking    pioglitazone, 30 mg, Oral, DAILY, Taking    cyanocobalamin, , Taking    acetaminophen, , PRN    diclofenac sodium, , PRN    True Metrix Blood Glucose Test, , Taking    Droplet Pen Needles, , Taking    Insulin Pen Needle 32 G x 4 mm, Use to inject insulin daily as directed, Taking    BD Plastipak Syringe, use to draw B12 injection use as directed by MD, Taking    Carboxymethylcellulose Sod PF, 1 Drop, Both Eyes, Q2HRS WHILE AWAKE, Taking    Brimonidine Tartrate-Timolol, 1 Drop, Both Eyes, BID, Taking    gabapentin, 300 mg, Oral, DAILY, Taking    latanoprost, 1 Drop, Both Eyes, Nightly, Taking    Vitamin D3,  (Patient not taking: Reported on 3/31/2025), Not Taking    atorvastatin,  (Patient not taking: Reported on 3/31/2025), Not Taking    furosemide, 20 mg, Oral, QDAY PRN (Patient not taking: Reported on 3/31/2025), Not Taking    ferrous sulfate, 162.5 mg, Oral, DAILY (Patient not taking: Reported on 3/19/2025), Not Taking  ALLERGIES: Epinephrine, Nalbuphine, Novocain, Pseudoephedrine, and Levofloxacin    SOCIAL HISTORY   Social History     Tobacco Use   Smoking Status Never   Smokeless Tobacco Never     Change in weight:  Lost ~3 lbs since previous visit.  Exercise habits: minimal exercise - limited due to balance, mostly chair exercises (chair volleyball) and stretches. Fell while walking 5-6 months ago therefore limited.   Diet: low sodium (eats a lot of fruits and veggies, limits meats instead  opting for cheese, nuts, and eggs)    ASSESSMENT AND PLAN    BP is elevated    BP Goal 130/80     BP Monitoring Recommendations - Home BP     Proper technique for blood pressure monitoring reviewed with patient.  Pt instructed to check BP at varying times through out the day 4 times per week.  Provided pt log for blood pressure monitoring and pt instructed to bring in at each visit for reviews    Lifestyle Recommendations From Today’s Visit:    Dietary Sodium Restriction  Increase Physical Activity  Weight loss      Medication recommendations from today's visit:  ARB/ACEI: Lisinopril 10 mg BID  Will await BMP results prior to further titrating Lisinopril dose, as lisinopril was increased from 10 mg daily to 10 mg BID within previous 2 weeks. Would recommend increase to 10 mg QAM and 20 mg QPM as next dose increase pending lab results.   Diuretic: Will await BMP results prior to initiating diuretic therapy.   Calcium Channel Blocker: Would avoid at this time due to reported lower extremity edema; however, may consider in the future if warranted.   Importance of adherence discussed    Other recommendations: Limit green tea and NSAIDs, pt reports poor sleep quality at night which then leads to napping during the day. Recommended limiting morning Gabapentin dosing to help reduce day-time drowsiness/sleepiness.     Studies Ordered at Todays Visit: None  Blood Work Ordered At Today’s visit: None, BMP was ordered by Cardiology for patient to obtain this week, provided patient with printed lab order to take to [facility]    Follow-Up: 2 weeks (every 1-4 weeks until at goal)     Isaak Gasca, PharmD    CC:  Nichole Hill, KIRAN

## 2025-03-31 NOTE — PATIENT INSTRUCTIONS
Please obtain labs over the next few days.    Continue with Lisinopril 10 mg twice daily until follow-up appointment on 4/15/25.    Continue to check home BP readings as you have been doing.

## 2025-04-01 DIAGNOSIS — I63.9 CEREBROVASCULAR ACCIDENT (CVA), UNSPECIFIED MECHANISM (HCC): Chronic | ICD-10-CM

## 2025-04-01 DIAGNOSIS — R07.89 OTHER CHEST PAIN: Chronic | ICD-10-CM

## 2025-04-01 DIAGNOSIS — R79.89 ELEVATED BRAIN NATRIURETIC PEPTIDE (BNP) LEVEL: ICD-10-CM

## 2025-04-02 ENCOUNTER — RESULTS FOLLOW-UP (OUTPATIENT)
Dept: CARDIOLOGY | Facility: MEDICAL CENTER | Age: 80
End: 2025-04-02

## 2025-04-15 ENCOUNTER — NON-PROVIDER VISIT (OUTPATIENT)
Dept: CARDIOLOGY | Facility: MEDICAL CENTER | Age: 80
End: 2025-04-15
Attending: NURSE PRACTITIONER
Payer: MEDICARE

## 2025-04-15 VITALS
WEIGHT: 160 LBS | HEIGHT: 62 IN | HEART RATE: 69 BPM | SYSTOLIC BLOOD PRESSURE: 181 MMHG | DIASTOLIC BLOOD PRESSURE: 48 MMHG | BODY MASS INDEX: 29.44 KG/M2

## 2025-04-15 DIAGNOSIS — Z79.4 TYPE 2 DIABETES MELLITUS WITH DIABETIC NEUROPATHY, WITH LONG-TERM CURRENT USE OF INSULIN (HCC): ICD-10-CM

## 2025-04-15 DIAGNOSIS — I10 HYPERTENSION, UNSPECIFIED TYPE: ICD-10-CM

## 2025-04-15 DIAGNOSIS — E78.5 DYSLIPIDEMIA: ICD-10-CM

## 2025-04-15 DIAGNOSIS — E11.40 TYPE 2 DIABETES MELLITUS WITH DIABETIC NEUROPATHY, WITH LONG-TERM CURRENT USE OF INSULIN (HCC): ICD-10-CM

## 2025-04-15 DIAGNOSIS — N18.4 CHRONIC KIDNEY DISEASE (CKD), STAGE IV (SEVERE) (HCC): ICD-10-CM

## 2025-04-15 PROCEDURE — 99212 OFFICE O/P EST SF 10 MIN: CPT

## 2025-04-15 ASSESSMENT — FIBROSIS 4 INDEX: FIB4 SCORE: 4.22

## 2025-04-15 NOTE — Clinical Note
Isaak,  Just a reminder it looks like this patient has diastolic heart failure.  Generally not recommended to keep them on pioglitazone.  This patient looks like a weird referral and complex.  But I am going to discontinue it today.  Norm

## 2025-04-15 NOTE — PATIENT INSTRUCTIONS
Long-acting insulin:   Adjust dose according to FASTING BLOOD GLUCOSE target 100-150 mg/dL  Increase the insulin dose every 3-4 days as needed.   Increase by 2 units if FBG average concentration is >150 mg/dL.   Consider cutting back by 1-2 units to previous dose if glucose concentration is below 100 mg/dL or symptoms of hypoglycemia.       Consider increasing lisinopril and/or switching to losartan for blood pressure lowering  Consider retrial of furosemide or torsemide-defer to nephrology  Consider Farxiga low-dose for chronic kidney disease plus diabetes plus diastolic heart failure  Although not preferred, can consider carvedilol for blood pressure lowering  Can consider hydralazine  Stop pioglitazone due to diastolic heart failure with edema  Decrease Lantus due to hypoglycemia in the mornings with blood sugars in the 60s

## 2025-04-15 NOTE — PROGRESS NOTES
CHF Pharmacotherapy visit:    Pam Hernandez  1945    PCP:  Brittni Zhou M.D.  [address] [facility] [facility]  [place] [address]    Patient Care Team                Brittni Zhou M.D. PCP - General, Geriatrics [phone]     [address] [place] [address]    Bhargav Bermeo D.O. PCP - [facility] Paneled [phone]     [address] [place] [address]    [facility] ([facility])  [phone]     [address] [facility]  [place] [address]    Ermias Coleman P.A.-C. Endocrinology [phone] [address] [place] [address]          HPI  Pam Hernandez is here for CHF with diastolic heart failure.  Also assisting with medication related to chronic kidney disease.  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  Problem list also notes type 2 diabetes    Pertinent Interval History since last visit:   Follow-up appointment  Most recent EF:   Left Ventricular Ejection Fraction (LVEF):  Lab Results   Component Value Date/Time    LVEF 70 04/28/2016 1214     CONCLUSIONS  No prior study is available for comparison.   Normal left ventricular systolic function.  Left ventricular ejection fraction is visually estimated to be 70%.  Grade I diastolic dysfunction.  Mild tricuspid regurgitation.  Estimated right ventricular systolic pressure is 25 mmHg.    Recent Imaging Studies:        Immunization History   Administered Date(s) Administered    Covid-19 Mrna (Spikevax) Moderna 12+ Years 01/26/2024, 11/19/2024    Hep A/HEP B Combined Vaccine (TwinRix) 08/06/2013, 01/15/2014, 07/31/2014    INFLUENZA TIV (IM) 09/07/2013    Influenza Seasonal Injectable - Historical Data 09/07/2013, 01/15/2014, 10/16/2014, 10/29/2015, 11/03/2016, 09/26/2017    Influenza Vac Subunit Quad Inj (Pf) 10/12/2021    Influenza Vaccine Adult HD 10/02/2020    Influenza Vaccine H1N1 - HISTORICAL DATA 12/11/2009    Influenza split virus trivalent (PF) 12/06/2010, 10/18/2011    Influenza, unspecified formulation  "10/10/2018, 10/10/2019    MODERNA BIVALENT BOOSTER SARS-COV-2 VACCINE (6+) 11/03/2022, 06/20/2023    MODERNA SARS-COV-2 VACCINE (12+) 01/04/2021, 02/01/2021, 11/09/2021, 06/30/2022    Pneumococcal Conjugate Vaccine (Prevnar/PCV-13) 02/15/2016    Pneumococcal polysaccharide vaccine (PPSV-23) 01/12/2010, 09/07/2013    TD Vaccine 04/22/2003    Tdap Vaccine 01/15/2014    Zoster Vaccine Live (ZVL) (Zostavax) - HISTORICAL DATA 08/06/2013    Zoster Vaccine Recombinant (RZV) (SHINGRIX) 03/05/2020, 12/10/2020       SOCIAL HISTORY  Social History     Tobacco Use   Smoking Status Never   Smokeless Tobacco Never        DATA REVIEW  Lab Results   Component Value Date/Time    HBA1C 8.6 (H) 09/09/2022 02:00 PM              Lab Results   Component Value Date/Time    CHOLSTRLTOT 176 09/11/2022 01:11 AM     (H) 09/11/2022 01:11 AM    HDL 41 09/11/2022 01:11 AM    TRIGLYCERIDE 113 09/11/2022 01:11 AM       No results found for: \"LIPOPROTA\"   No results found for: \"APOB\"    Lab Results   Component Value Date/Time    SODIUM 136 03/03/2025 10:24 PM    POTASSIUM 5.3 03/03/2025 10:24 PM    CHLORIDE 107 03/03/2025 10:24 PM    CO2 18 (L) 03/03/2025 10:24 PM    GLUCOSE 151 (H) 03/03/2025 10:24 PM    BUN 32 (H) 03/03/2025 10:24 PM    CREATININE 1.91 (H) 03/03/2025 10:24 PM    CREATININE 0.9 08/09/2007 04:52 AM     Lab Results   Component Value Date/Time    ALKPHOSPHAT 137 (H) 03/03/2025 10:24 PM    ASTSGOT 25 03/03/2025 10:24 PM    ALTSGPT 11 03/03/2025 10:24 PM    TBILIRUBIN 0.3 03/03/2025 10:24 PM    INR 0.98 01/23/2019 08:00 AM    ALBUMIN 3.8 03/03/2025 10:24 PM      Lab Results   Component Value Date/Time    RBC 3.65 (L) 03/03/2025 10:24 PM    HEMOGLOBIN 11.3 (L) 03/03/2025 10:24 PM    HEMATOCRIT 35.5 (L) 03/03/2025 10:24 PM    MCV 97.3 03/03/2025 10:24 PM    MCH 31.0 03/03/2025 10:24 PM    MCHC 31.8 (L) 03/03/2025 10:24 PM    MPV 10.7 03/03/2025 10:24 PM      No results found for: \"MALBCRT\", \"MICROALBUR\"  Lab Results   Component " "Value Date/Time    TSHULTRASEN 4.080 09/10/2022 0951     Lab Results   Component Value Date/Time    FREET4 0.90 01/24/2019 1643      Latest Reference Range & Units 09/10/22 00:50 09/11/22 01:11 09/12/22 02:08 03/03/25 22:24   Creatinine 0.50 - 1.40 mg/dL 2.17 (H) 2.17 (H) 2.25 (H) 1.91 (H)   GFR (CKD-EPI) >60 mL/min/1.73 m 2 23 ! 23 ! 22 ! 26 !     No results found for: \"FREET3\"  No results found for: \"THYSTIMIG\"          Significant changes to laboratory values since last visit that require repeat labs:  -  Other Pertinent Blood Work:   -      Current Outpatient Medications:     lisinopril, 10 mg, Oral, BID, Taking    Aspirin Low Dose, , Taking    FreeStyle Flaco 2 Sensor, , Taking    Lantus SoloStar, 10 Units, Subcutaneous, Q EVENING, Taking    pioglitazone, 30 mg, Oral, DAILY, Taking    cyanocobalamin, , Taking    acetaminophen, , Taking    Insulin Pen Needle 32 G x 4 mm, Use to inject insulin daily as directed, Taking    gabapentin, 300 mg, Oral, DAILY, Taking    Vitamin D3,  (Patient not taking: Reported on 3/31/2025)    diclofenac sodium,     True Metrix Blood Glucose Test,     Droplet Pen Needles,     BD Plastipak Syringe, use to draw B12 injection use as directed by MD    Carboxymethylcellulose Sod PF, 1 Drop, Both Eyes, Q2HRS WHILE AWAKE    Brimonidine Tartrate-Timolol, 1 Drop, Both Eyes, BID    ferrous sulfate, 162.5 mg, Oral, DAILY (Patient not taking: Reported on 3/19/2025)    latanoprost, 1 Drop, Both Eyes, Nightly    Vitals:    04/15/25 1446   BP: (!) 181/48   Pulse: 69       ASSESSMENT AND PLAN    CHF & HTN:  PUMP line number [id number] (PUMP) reviewed with patient  Is blood pressure at less <130/80 in clinic today: N  Home BP and HR:  158/71, HR 64  Lower extremity edema present: some   Shortness of breath at rest: y  LANDIS, dyspnea on exertion: y  Orthopnea: y  PND, paroxysmal nocturnal dyspnea:  y  Change in weight: Stable  Exercise habits: no regular exercise program   Current Adherence to CHF and " other therapies: Partial  Kidney function:  26 ml/min estimated GFR  Lab Results   Component Value Date/Time    BUN 32 (H) 03/03/2025 10:24 PM    CREATININE 1.91 (H) 03/03/2025 10:24 PM    CREATININE 2.25 (H) 09/12/2022 02:08 AM    CREATININE 2.17 (H) 09/11/2022 01:11 AM    CREATININE 0.9 08/09/2007 04:52 AM    CREATININE 0.7 07/30/2007 09:40 AM    CREATININE 0.8 05/18/2006 07:25 PM       CHF medications:  Declined Furosemide due to perceived side effects in the past including elevated blood pressure.  Obviously with her elevated blood pressure today this is likely the root cause., Will defer to nephrologist   Could consider torsemide.   Consider SGLT2 for chronic kidney disease and diastolic heart failure.   She has previously tried jardiance but was stopped due to UTIs  Could consider carvedilol, even though not as much data with diastolic heart failure, it would provide some blood pressure lowering  Could consider hydralazine/nitrate if needed  Declined any other additions to her medication regimen today.  She would prefer to talk with nephrology first.  Due to her reluctance for initiating new therapies plus her chronic kidney disease and complex medical history I will place a referral to our vascular medicine department for assistance.     DM   A1c trend     Lab Results   Component Value Date/Time    HBA1C 8.6 (H) 09/09/2022 02:00 PM    HBA1C 9.5 (A) 06/17/2019 09:15 AM    HBA1C 8.3 (H) 01/22/2019 06:54 PM      Kidney function:  Lab Results   Component Value Date/Time    BUN 32 (H) 03/03/2025 10:24 PM    CREATININE 1.91 (H) 03/03/2025 10:24 PM    CREATININE 2.25 (H) 09/12/2022 02:08 AM    CREATININE 2.17 (H) 09/11/2022 01:11 AM    CREATININE 0.9 08/09/2007 04:52 AM    CREATININE 0.7 07/30/2007 09:40 AM    CREATININE 0.8 05/18/2006 07:25 PM     Diabetes medication:   Discontinue pioglitazone due to diastolic congestive heart failure  Decrease to Lantus 7 units at bedtime,   due to FBG of 60s in the AM      Long-acting insulin:   Adjust dose according to FASTING BLOOD GLUCOSE target 100-150 mg/dL  Increase the insulin dose every 3-4 days as needed.   Increase by 2 units if FBG average concentration is >150 mg/dL.   Consider cutting back by 1-2 units to previous dose if glucose concentration is below 100 mg/dL or symptoms of hypoglycemia.     Lipids:  Is LDL <55: Very high risk category due to CVA, plus age, plus diabetes, plus diastolic heart failure.  Lab Results   Component Value Date/Time     (H) 09/11/2022 01:11 AM    LDL 76 04/28/2016 03:38 AM       lipid medication:   Declined initiation of atorvastatin.   She was previously on atorvastatin but has since discontinued  Patient is a poor historian and does not recall why atorvastatin was discontinued; however, it may have been due to perceived limited benefit in advanced CKD. That said, evidence supports cardiovascular risk reduction with statin use in patients with CKD who are not yet on dialysis.       Lifestyle   Lifestyle Recommendations From Today's Visit:   Diet: common adult  Continue to eat DASH/MED style diet.   Continue to exercise as tolerated.  Salt restriction to <2300mg daily     https://www.nhlbi.nih.gov/education/dash-eating-plan          Blood Work Ordered At Today's visit: as ordered   Studies Ordered at Todays Visit:no  Follow-Up: Return in about 1 week (around 4/22/2025).    Yossi Olmos, PharmD  [facility]  Phone: [phone], Fax: [phone]    This note was created using voice recognition software (Dragon). The accuracy of the dictation is limited by the abilities of the software. I have reviewed the note prior to signing, however some errors in grammar and context are still possible. If you have any questions related to this note please do not hesitate to contact our office.     CC:  Brittni Zhou M.D.  No ref. provider found

## 2025-04-16 ENCOUNTER — TELEPHONE (OUTPATIENT)
Dept: HEALTH INFORMATION MANAGEMENT | Facility: OTHER | Age: 80
End: 2025-04-16
Payer: MEDICARE

## 2025-04-17 LAB
CHOLEST SERPL-MCNC: 161 MG/DL
HDLC SERPL-MCNC: 54 MG/DL
LDLC SERPL CALC-MCNC: 82 MG/DL
TRIGL SERPL-MCNC: 148 MG/DL

## 2025-04-21 ENCOUNTER — RESULTS FOLLOW-UP (OUTPATIENT)
Dept: CARDIOLOGY | Facility: MEDICAL CENTER | Age: 80
End: 2025-04-21
Payer: MEDICARE

## 2025-04-21 DIAGNOSIS — E78.5 DYSLIPIDEMIA: ICD-10-CM

## 2025-04-22 ENCOUNTER — NON-PROVIDER VISIT (OUTPATIENT)
Dept: CARDIOLOGY | Facility: MEDICAL CENTER | Age: 80
End: 2025-04-22
Attending: NURSE PRACTITIONER
Payer: MEDICARE

## 2025-04-22 VITALS
BODY MASS INDEX: 29.26 KG/M2 | WEIGHT: 160 LBS | HEART RATE: 70 BPM | DIASTOLIC BLOOD PRESSURE: 50 MMHG | SYSTOLIC BLOOD PRESSURE: 183 MMHG

## 2025-04-22 DIAGNOSIS — E78.5 DYSLIPIDEMIA: ICD-10-CM

## 2025-04-22 PROCEDURE — 99212 OFFICE O/P EST SF 10 MIN: CPT

## 2025-04-22 RX ORDER — EZETIMIBE 10 MG/1
10 TABLET ORAL DAILY
Qty: 90 TABLET | Refills: 1 | Status: SHIPPED | OUTPATIENT
Start: 2025-04-22

## 2025-04-22 RX ORDER — LISINOPRIL 20 MG/1
20 TABLET ORAL DAILY
COMMUNITY

## 2025-04-22 ASSESSMENT — FIBROSIS 4 INDEX: FIB4 SCORE: 4.22

## 2025-04-22 NOTE — PROGRESS NOTES
CHF Pharmacotherapy visit:    Pam Hernandez  1945    PCP:  Brittni Zhou M.D.  [address] [facility]  [place] [address]    Patient Care Team                Brittni Zhou M.D. PCP - General, Geriatrics [phone]     [address] [place] [address]    Bhargav Bermeo D.O. PCP - [facility] Paneled [phone]     [address] [place] [address]    [facility] ([facility])  [phone]     [address] [facility]  [place] [address]    Ermias Coleman P.A.-C. Endocrinology [phone] [address] [place] [address]          HPI  Pam Hernandez is here for CHF with diastolic heart failure.  Also assisting with medication related to chronic kidney disease.  She has ASCVD due to a CVA that is known to be cardioembolic.  Problem list also notes type 2 diabetes.     Pertinent Interval History since last visit:   Follow-up appointment  Most recent EF:   Left Ventricular Ejection Fraction (LVEF):  Lab Results   Component Value Date/Time    LVEF 70 04/28/2016 1214     CONCLUSIONS  No prior study is available for comparison.   Normal left ventricular systolic function.  Left ventricular ejection fraction is visually estimated to be 70%.  Grade I diastolic dysfunction.  Mild tricuspid regurgitation.  Estimated right ventricular systolic pressure is 25 mmHg.    Recent Imaging Studies:        Immunization History   Administered Date(s) Administered    Covid-19 Mrna (Spikevax) Moderna 12+ Years 01/26/2024, 11/19/2024    Hep A/HEP B Combined Vaccine (TwinRix) 08/06/2013, 01/15/2014, 07/31/2014    INFLUENZA TIV (IM) 09/07/2013    Influenza Seasonal Injectable - Historical Data 09/07/2013, 01/15/2014, 10/16/2014, 10/29/2015, 11/03/2016, 09/26/2017    Influenza Vac Subunit Quad Inj (Pf) 10/12/2021    Influenza Vaccine Adult HD 10/02/2020    Influenza Vaccine H1N1 - HISTORICAL DATA 12/11/2009    Influenza split virus trivalent (PF) 12/06/2010, 10/18/2011    Influenza, unspecified formulation  "10/10/2018, 10/10/2019    MODERNA BIVALENT BOOSTER SARS-COV-2 VACCINE (6+) 11/03/2022, 06/20/2023    MODERNA SARS-COV-2 VACCINE (12+) 01/04/2021, 02/01/2021, 11/09/2021, 06/30/2022    Pneumococcal Conjugate Vaccine (Prevnar/PCV-13) 02/15/2016    Pneumococcal polysaccharide vaccine (PPSV-23) 01/12/2010, 09/07/2013    TD Vaccine 04/22/2003    Tdap Vaccine 01/15/2014    Zoster Vaccine Live (ZVL) (Zostavax) - HISTORICAL DATA 08/06/2013    Zoster Vaccine Recombinant (RZV) (SHINGRIX) 03/05/2020, 12/10/2020       SOCIAL HISTORY  Social History     Tobacco Use   Smoking Status Never   Smokeless Tobacco Never        DATA REVIEW  Lab Results   Component Value Date/Time    HBA1C 6.9 4/17/25                          Lab Results   Component Value Date/Time    CHOLSTRLTOT 161 04/17/2025 12:00 AM    LDL 82 04/17/2025 12:00 AM    HDL 54 04/17/2025 12:00 AM    TRIGLYCERIDE 148 04/17/2025 12:00 AM       No results found for: \"LIPOPROTA\"   No results found for: \"APOB\"    Lab Results   Component Value Date/Time    SODIUM 136 03/03/2025 10:24 PM    POTASSIUM 5.3 03/03/2025 10:24 PM    CHLORIDE 107 03/03/2025 10:24 PM    CO2 18 (L) 03/03/2025 10:24 PM    GLUCOSE 151 (H) 03/03/2025 10:24 PM    BUN 32 (H) 03/03/2025 10:24 PM    CREATININE 1.91 (H) 03/03/2025 10:24 PM    CREATININE 0.9 08/09/2007 04:52 AM     Lab Results   Component Value Date/Time    ALKPHOSPHAT 137 (H) 03/03/2025 10:24 PM    ASTSGOT 25 03/03/2025 10:24 PM    ALTSGPT 11 03/03/2025 10:24 PM    TBILIRUBIN 0.3 03/03/2025 10:24 PM    INR 0.98 01/23/2019 08:00 AM    ALBUMIN 3.8 03/03/2025 10:24 PM      Lab Results   Component Value Date/Time    RBC 3.65 (L) 03/03/2025 10:24 PM    HEMOGLOBIN 11.3 (L) 03/03/2025 10:24 PM    HEMATOCRIT 35.5 (L) 03/03/2025 10:24 PM    MCV 97.3 03/03/2025 10:24 PM    MCH 31.0 03/03/2025 10:24 PM    MCHC 31.8 (L) 03/03/2025 10:24 PM    MPV 10.7 03/03/2025 10:24 PM      No results found for: \"MALBCRT\", \"MICROALBUR\"  Lab Results   Component Value " "Date/Time    TSHULTRASEN 4.080 09/10/2022 0951     Lab Results   Component Value Date/Time    FREET4 0.90 01/24/2019 1643      Latest Reference Range & Units 09/10/22 00:50 09/11/22 01:11 09/12/22 02:08 03/03/25 22:24   Creatinine 0.50 - 1.40 mg/dL 2.17 (H) 2.17 (H) 2.25 (H) 1.91 (H)   GFR (CKD-EPI) >60 mL/min/1.73 m 2 23 ! 23 ! 22 ! 26 !     No results found for: \"FREET3\"  No results found for: \"THYSTIMIG\"          Significant changes to laboratory values since last visit that require repeat labs:  UACR 952  Other Pertinent Blood Work:   - A1c 6.9%      Current Outpatient Medications:     lisinopril, 20 mg, Oral, BID, Taking    ezetimibe, 10 mg, Oral, DAILY, Taking    Aspirin Low Dose, , Taking    acetaminophen, , Taking    diclofenac sodium, , Taking    Carboxymethylcellulose Sod PF, 1 Drop, Both Eyes, Q2HRS WHILE AWAKE, Taking    Brimonidine Tartrate-Timolol, 1 Drop, Both Eyes, BID, Taking    gabapentin, 300 mg, Oral, DAILY, Taking    latanoprost, 1 Drop, Both Eyes, Nightly, Taking    FreeStyle Flaco 2 Sensor,     Lantus SoloStar, 5 Units, Subcutaneous, Q EVENING    cyanocobalamin,  (Patient not taking: Reported on 4/22/2025), Not Taking    Vitamin D3,  (Patient not taking: Reported on 3/31/2025)    True Metrix Blood Glucose Test,     Droplet Pen Needles,     Insulin Pen Needle 32 G x 4 mm, Use to inject insulin daily as directed    BD Plastipak Syringe, use to draw B12 injection use as directed by MD    ferrous sulfate, 162.5 mg, Oral, DAILY (Patient not taking: Reported on 3/19/2025)    Vitals:    04/22/25 1029   BP: (!) 183/50   Pulse: 70         ASSESSMENT AND PLAN    CHF & HTN:  PUMP line number [id number] (PUMP) reviewed with patient  Is blood pressure at less <130/80 in clinic today: N, she has not started her increased dose of lisinopril yet  Home BP and HR:  158/71, HR 64  Lower extremity edema present: some   Shortness of breath at rest: n  LANDIS, dyspnea on exertion: n  Orthopnea: n  PND, paroxysmal " nocturnal dyspnea:  n  Change in weight: Stable  Exercise habits: no regular exercise program   Current Adherence to CHF and other therapies: Partial  Kidney function:  26 ml/min estimated GFR  Lab Results   Component Value Date/Time    BUN 32 (H) 03/03/2025 10:24 PM    CREATININE 1.91 (H) 03/03/2025 10:24 PM    CREATININE 2.25 (H) 09/12/2022 02:08 AM    CREATININE 2.17 (H) 09/11/2022 01:11 AM    CREATININE 0.9 08/09/2007 04:52 AM    CREATININE 0.7 07/30/2007 09:40 AM    CREATININE 0.8 05/18/2006 07:25 PM       CHF medications:  Increase to lisinopril 20 mg twice daily-per nephrology   She was only taking 20 mg once daily and did not understand his instructions  Declined Furosemide due to perceived side effects in the past including elevated blood pressure.  Obviously with her elevated blood pressure today this is likely the root cause. Will defer to nephrologist   Could consider torsemide.   Consider SGLT2 for chronic kidney disease and diastolic heart failure.   She has previously tried jardiance but was stopped due to UTIs  Declined but could consider  carvedilol, even though not as much data with diastolic heart failure, it would provide some blood pressure lowering  Could consider hydralazine/nitrate if needed  Due to her reluctance for initiating new therapies plus her chronic kidney disease and complex medical history I will place a referral to our vascular medicine department for assistance.  She is scheduled to see them in June    DM   A1c at goal of less than 7.  She is experiencing some hypoglycemic episodes with sugars into the 50s.    Diabetes medication:   Decrease to Lantus 5 units at bedtime,   Due to FBG of 50s in the AM   Declined any other changes today    Long-acting insulin:   Adjust dose according to FASTING BLOOD GLUCOSE target 100-150 mg/dL  Increase the insulin dose every 3-4 days as needed.   Increase by 2 units if FBG average concentration is >150 mg/dL.   Consider cutting back by 1-2  units to previous dose if glucose concentration is below 100 mg/dL or symptoms of hypoglycemia.     Lipids:  Is LDL <55: Very high risk category due to CVA (possibly cardioembolic) and peripheral vascular disease, plus age, plus diabetes, plus diastolic heart failure.  Lab Results   Component Value Date/Time    LDL 82 04/17/2025 12:00 AM     (H) 09/11/2022 01:11 AM    LDL 76 04/28/2016 03:38 AM       lipid medication:   Declined initiation of atorvastatin.   She was previously on atorvastatin but has since discontinued  Patient is a poor historian and does not recall why atorvastatin was discontinued; however, it may have been due to perceived limited benefit in advanced CKD. That said, evidence supports cardiovascular risk reduction with statin use in patients with CKD who are not yet on dialysis.   Start Zetia 10 mg once daily      Lifestyle   Lifestyle Recommendations From Today's Visit:   Diet: common adult  Continue to eat DASH/MED style diet.   Continue to exercise as tolerated.  Salt restriction to <2300mg daily     https://www.nhlbi.nih.gov/education/dash-eating-plan          Blood Work Ordered At Today's visit: as ordered   Studies Ordered at Todays Visit:no  Follow-Up: Return in about 4 months (around 8/22/2025).    Yossi Olmos, PharmD  [facility]  Phone: [phone], Fax: [phone]    This note was created using voice recognition software (Dragon). The accuracy of the dictation is limited by the abilities of the software. I have reviewed the note prior to signing, however some errors in grammar and context are still possible. If you have any questions related to this note please do not hesitate to contact our office.     CC:  Brittni Zhou M.D.  No ref. provider found

## 2025-04-30 ENCOUNTER — TELEPHONE (OUTPATIENT)
Dept: CARDIOLOGY | Facility: MEDICAL CENTER | Age: 80
End: 2025-04-30
Payer: MEDICARE

## 2025-04-30 ENCOUNTER — OFFICE VISIT (OUTPATIENT)
Dept: CARDIOLOGY | Facility: MEDICAL CENTER | Age: 80
End: 2025-04-30
Attending: NURSE PRACTITIONER
Payer: MEDICARE

## 2025-04-30 VITALS
SYSTOLIC BLOOD PRESSURE: 136 MMHG | HEIGHT: 62 IN | DIASTOLIC BLOOD PRESSURE: 70 MMHG | OXYGEN SATURATION: 100 % | HEART RATE: 63 BPM | WEIGHT: 156 LBS | BODY MASS INDEX: 28.71 KG/M2

## 2025-04-30 DIAGNOSIS — I10 ESSENTIAL HYPERTENSION, BENIGN: ICD-10-CM

## 2025-04-30 DIAGNOSIS — R79.89 ELEVATED BRAIN NATRIURETIC PEPTIDE (BNP) LEVEL: ICD-10-CM

## 2025-04-30 DIAGNOSIS — N18.4 CKD (CHRONIC KIDNEY DISEASE) STAGE 4, GFR 15-29 ML/MIN (HCC): ICD-10-CM

## 2025-04-30 DIAGNOSIS — N18.4 CHRONIC KIDNEY DISEASE (CKD), STAGE IV (SEVERE) (HCC): ICD-10-CM

## 2025-04-30 DIAGNOSIS — Z79.899 HIGH RISK MEDICATION USE: ICD-10-CM

## 2025-04-30 DIAGNOSIS — I50.30 ACC/AHA STAGE C HEART FAILURE WITH PRESERVED EJECTION FRACTION (HCC): ICD-10-CM

## 2025-04-30 DIAGNOSIS — E78.5 DYSLIPIDEMIA: ICD-10-CM

## 2025-04-30 DIAGNOSIS — Z79.4 TYPE 2 DIABETES MELLITUS WITH DIABETIC NEUROPATHY, WITH LONG-TERM CURRENT USE OF INSULIN (HCC): ICD-10-CM

## 2025-04-30 DIAGNOSIS — E11.40 TYPE 2 DIABETES MELLITUS WITH DIABETIC NEUROPATHY, WITH LONG-TERM CURRENT USE OF INSULIN (HCC): ICD-10-CM

## 2025-04-30 PROCEDURE — 99213 OFFICE O/P EST LOW 20 MIN: CPT | Performed by: NURSE PRACTITIONER

## 2025-04-30 RX ORDER — DAPAGLIFLOZIN 10 MG/1
10 TABLET, FILM COATED ORAL DAILY
Qty: 100 TABLET | Refills: 3 | Status: SHIPPED | OUTPATIENT
Start: 2025-04-30 | End: 2026-06-04

## 2025-04-30 RX ORDER — AMLODIPINE BESYLATE 2.5 MG/1
2.5 TABLET ORAL DAILY
Qty: 100 TABLET | Refills: 3 | Status: SHIPPED | OUTPATIENT
Start: 2025-04-30 | End: 2026-06-04

## 2025-04-30 ASSESSMENT — FIBROSIS 4 INDEX: FIB4 SCORE: 4.22

## 2025-04-30 NOTE — TELEPHONE ENCOUNTER
Phone number called: [phone] [facility]    Call outcome: I called rivka but there was no answer. Will try again.

## 2025-04-30 NOTE — TELEPHONE ENCOUNTER
MINO        Caller: SHALOM WITH [facility]    Topic/issue: Their office was calling about the   FARXIGA 10 MG Tab medication and that the patient was under the EGFR threshold to be able to be prescribed this medication and their office was needing a clearance before it can be prescribed for this patient and they were asking for a call back. Please advise        Callback Number: see below    Thank you    -Abdi EMANUEL

## 2025-04-30 NOTE — PROGRESS NOTES
Chief Complaint   Patient presents with    Chest Pain     F/V DX:Other chest pain         Subjective     Pam Hernandez is a 79 y.o. female who presents today for chest pain follow-up after being evaluated in the ER on 3/3/2025.  Patient has additional medical problems of hypertension, diabetes, hyperlipidemia, stroke.    Previous patient of Dr. Flores, she was last seen by myself on 3/19/2025.  Last seen by pharmacy clinic on 4/22/2025 where lisinopril was increased and xetia was added    Today, Patient feels well, denies chest pain, shortness of breath, palpitations, dizziness/lightheadedness, orthopnea, PND or Edema.  Patient believes that she misunderstood her nephrologist and is now taking lisinopril 20 mg daily.  Patient reports blood pressure at home has been elevated 130s to 150s systolically.    Blood work reviewed per below.  Echocardiogram remains pending.  Patient appears euvolemic on exam.    We will add amlodipine and Farxiga.  We will reevaluate renal and electrolyte function for his medication use in 1 week.  Patient to follow-up in 2 months as previously scheduled.    Past Medical History:   Diagnosis Date    Acid reflux     Acute nasopharyngitis [id number]    Anemia     Anesthesia     reports wants sleep, hard to wake up    Arthritis     fingers/hands    At risk for falling     Cataract     removed Dr.Stanko benjamín    Dental disorder     partial lower, plate upper    Diabetes     oral meds and insulin dependent    Elevated lipase 1/23/2019    Glaucoma     bilat    Hyperkalemia 1/23/2019    Hypertension     Hypothyroid 12/10/2018    on no meds at this time    Neuropathy (HCC)     Pneumonia     2015    Psychiatric problem     depression    Snoring     no sleep study    Urinary frequency     Urinary incontinence     wears a pad     Past Surgical History:   Procedure Laterality Date    MADELINE BY LAPAROSCOPY N/A 10/18/2019    Procedure: CHOLECYSTECTOMY, LAPAROSCOPIC;  Surgeon: Kathleen Pierson M.D.;   Location: SURGERY SAME DAY [facility] ORS;  Service: General    VITRECTOMY POSTERIOR Right 12/11/2018    Procedure: VITRECTOMY POSTERIOR-  LASER;  Surgeon: Katina Bolaños M.D.;  Location: SURGERY SAME DAY [facility];  Service: Ophthalmology    EYE SURGERY Left 2018    for glaucoma    CATARACT EXTRACTION WITH IOL Bilateral     US-NEEDLE CORE BX-BREAST PANEL       Family History   Problem Relation Age of Onset    Coronary artery disease Mother      Social History     Socioeconomic History    Marital status:      Spouse name: Not on file    Number of children: Not on file    Years of education: Not on file    Highest education level: Not on file   Occupational History    Not on file   Tobacco Use    Smoking status: Never    Smokeless tobacco: Never   Vaping Use    Vaping status: Never Used   Substance and Sexual Activity    Alcohol use: No    Drug use: No    Sexual activity: Not on file   Other Topics Concern    Not on file   Social History Narrative    Not on file     Social Drivers of Health     Financial Resource Strain: Not on file   Food Insecurity: Not on file   Transportation Needs: Not on file   Physical Activity: Not on file   Stress: Not on file   Social Connections: Not on file   Intimate Partner Violence: Not on file   Housing Stability: Not on file     Allergies   Allergen Reactions    Epinephrine      Other reaction(s): Dizziness    Nalbuphine      Other reaction(s): Depression    Novocain Anaphylaxis    Pseudoephedrine      Caused depression.  Other reaction(s): Depression    Lasix [Furosemide] Unspecified     Elevated BP - per pt     Levofloxacin      Other reaction(s): Pruritic rash     Outpatient Encounter Medications as of 4/30/2025   Medication Sig Dispense Refill    FARXIGA 10 MG Tab Take 1 Tablet by mouth every day. 100 Tablet 3    amLODIPine (NORVASC) 2.5 MG Tab Take 1 Tablet by mouth every day. 100 Tablet 3    lisinopril (PRINIVIL) 20 MG Tab Take 20 mg by mouth every day.       "ezetimibe (ZETIA) 10 MG Tab Take 1 Tablet by mouth every day. 90 Tablet 1    ASPIRIN LOW DOSE 81 MG EC tablet every day.      Continuous Blood Gluc Sensor (FREESTYLE ADAL 2 SENSOR) Mis       LANTUS SOLOSTAR 100 UNIT/ML Solution Pen-injector injection Inject 5 Units under the skin every evening.      acetaminophen (TYLENOL) 325 MG Tab       diclofenac sodium (VOLTAREN) 1 % Gel       TRUE METRIX BLOOD GLUCOSE TEST strip       DROPLET PEN NEEDLES       Insulin Pen Needle 32 G x 4 mm Use to inject insulin daily as directed 100 Each 0    Syringe (BD PLASTIPAK SYRINGE) 3 ML Misc use to draw B12 injection use as directed by MD 11 Each 3    Carboxymethylcellulose Sod PF 0.5 % Solution Administer 1 Drop into both eyes every 2 hours while awake.      Brimonidine Tartrate-Timolol 0.2-0.5 % Solution Administer 1 Drop into both eyes 2 times a day.      gabapentin (NEURONTIN) 100 MG Cap Take 300 mg by mouth every day.      latanoprost (XALATAN) 0.005 % Solution Place 1 Drop in both eyes every evening.      [DISCONTINUED] cyanocobalamin (VITAMIN B-12) 500 MCG Tab  (Patient not taking: Reported on 4/30/2025)      [DISCONTINUED] Cholecalciferol (VITAMIN D3) 50 MCG (2000 UT) Tab  (Patient not taking: Reported on 4/30/2025)      [DISCONTINUED] ferrous sulfate 325 (65 Fe) MG tablet Take 162.5 mg by mouth every day. 162.5 mg = 1/2 tablet (Patient not taking: Reported on 3/19/2025)       No facility-administered encounter medications on file as of 4/30/2025.     ROS Complete review of systems negative except as noted in HPI/subjective             Objective     /70 (BP Location: Right arm, Patient Position: Sitting, BP Cuff Size: Adult)   Pulse 63   Ht 1.575 m (5' 2\")   Wt 70.8 kg (156 lb)   LMP  (LMP Unknown)   SpO2 100%   BMI 28.53 kg/m²     Physical Exam  Vitals reviewed.   Constitutional:       Appearance: She is well-developed.   HENT:      Head: Normocephalic and atraumatic.   Eyes:      Pupils: Pupils are equal, " round, and reactive to light.   Neck:      Vascular: No carotid bruit or JVD.   Cardiovascular:      Rate and Rhythm: Normal rate and regular rhythm.      Heart sounds: Murmur heard.      Systolic murmur is present with a grade of 2/6.      No friction rub. No gallop.   Pulmonary:      Effort: Pulmonary effort is normal. No respiratory distress.      Breath sounds: Normal breath sounds.   Abdominal:      General: Bowel sounds are normal. There is no distension.      Palpations: Abdomen is soft.   Musculoskeletal:      Right lower leg: No edema.      Left lower leg: No edema.   Skin:     General: Skin is warm and dry.      Findings: No erythema.   Neurological:      General: No focal deficit present.      Mental Status: She is alert and oriented to person, place, and time.   Psychiatric:         Behavior: Behavior normal.       Lab Results   Component Value Date/Time    CHOLSTRLTOT 161 04/17/2025 12:00 AM    LDL 82 04/17/2025 12:00 AM    HDL 54 04/17/2025 12:00 AM    TRIGLYCERIDE 148 04/17/2025 12:00 AM       Lab Results   Component Value Date/Time    SODIUM 136 03/03/2025 10:24 PM    POTASSIUM 5.3 03/03/2025 10:24 PM    CHLORIDE 107 03/03/2025 10:24 PM    CO2 18 (L) 03/03/2025 10:24 PM    GLUCOSE 151 (H) 03/03/2025 10:24 PM    BUN 32 (H) 03/03/2025 10:24 PM    CREATININE 1.91 (H) 03/03/2025 10:24 PM    CREATININE 0.9 08/09/2007 04:52 AM     Lab Results   Component Value Date/Time    ALKPHOSPHAT 137 (H) 03/03/2025 10:24 PM    ASTSGOT 25 03/03/2025 10:24 PM    ALTSGPT 11 03/03/2025 10:24 PM    TBILIRUBIN 0.3 03/03/2025 10:24 PM       Latest Reference Range & Units 03/03/25 22:24   NT-proBNP 0 - 125 pg/mL 410 (H)     TTE (9/10/2022):  Normal left ventricular systolic function. The left ventricular   ejection fraction is visually estimated to be 60%.   Grade I diastolic dysfunction.  The right ventricle is normal in size and systolic function.  No significant valvular abnormalities.   No recent study for comparison.             Assessment & Plan     1. Essential hypertension, benign  FARXIGA 10 MG Tab    amLODIPine (NORVASC) 2.5 MG Tab    Basic Metabolic Panel      2. Chronic kidney disease (CKD), stage IV (severe) ([facility])  FARXIGA 10 MG Tab    amLODIPine (NORVASC) 2.5 MG Tab    Basic Metabolic Panel      3. Elevated brain natriuretic peptide (BNP) level  FARXIGA 10 MG Tab    amLODIPine (NORVASC) 2.5 MG Tab    Basic Metabolic Panel      4. Type 2 diabetes mellitus with diabetic neuropathy, with long-term current use of insulin ([facility])  FARXIGA 10 MG Tab    amLODIPine (NORVASC) 2.5 MG Tab    Basic Metabolic Panel      5. Dyslipidemia  FARXIGA 10 MG Tab    amLODIPine (NORVASC) 2.5 MG Tab    Basic Metabolic Panel      6. High risk medication use  FARXIGA 10 MG Tab    amLODIPine (NORVASC) 2.5 MG Tab    Basic Metabolic Panel      7. ACC/AHA stage C heart failure with preserved ejection fraction ([facility])        8. CKD (chronic kidney disease) stage 4, GFR 15-29 ml/min ([facility])              Medical Decision Making: Today's Assessment/Status/Plan:          Hypertension; Elevated BNP, High risk medication use; CKD 4; HLD  -chest pain improved  -continue lisinopril 20 mg daily. Increase per nephrology.  -Add amplodipine and farxiga  -Continue xetia. LDL 82  -Refer to pharmacotherapy clinic to optimize blood pressure less than 130/80.  Home blood pressure monitoring instructions provided on after visit summary  -Echo scheduled  -BMP in 2 weeks    FU in clinic in 2 months with cardiology. Sooner if needed.    Patient verbalizes understanding and agrees with the plan of care.         GERONIMO Gan, CCK, HF-Cert   [facility]  [phone]    PLEASE NOTE: This Note was created using voice recognition Software. I have made every reasonable attempt to correct obvious errors, but I expect that there are errors of grammar and possibly content that I did not discover before finalizing the note               Cardiac/Digestive

## 2025-04-30 NOTE — TELEPHONE ENCOUNTER
MINO    Caller: Erwin with [facility]     Topic/issue: Returning Ethyl's call, unable to reach to transfer. Confirmed contact number is correct that is in routing comments if you can please try again.    Thank you,  Martita GONCALVES

## 2025-05-19 NOTE — Clinical Note
[facility]  [address] [place] [address]    [id number]    Pam Hernandez  [address] [place] [address]    May 19, 2025    Member Name: Pam Hernandez   Member Number: [id number]   Reference Number: [id number]   Approved Services: Echos and EKG   Approved Service Dates: 05/19/2025 - 09/16/2025   Requesting Provider: Timothy Mcclellan   Requested Provider: [facility]     Dear Pam Deborah Mary:    The following medical service(s) requested by Timothy Mcclellan have been approved:    Procedure Code Procedure Code Name Requested Quantity Approved Quantity Status   73668 (CPT®) AR ECHO HEART XTHORACIC,COMPLETE W DOPPLER 1 1 Authorized       Approved Quantity means the number of visits approved for medication treatments and/or medical services.    The services should be provided by [facility] no later than 09/16/2025. Please contact the provider listed below with any questions.     Provider Information:  [facility]  [phone]    Your plan benefit may require a deductible, co-payment or coinsurance for these services. This authorization does not guarantee [facility] will pay the claim for services that you receive. Payment by [facility] for these services is subject to the terms of your Evidence of Coverage, your eligibility at the time of service, and confirmation of benefit coverage.    For any questions or additional information, please contact Customer Service:    [facility] Toll Free: [phone]  TTY users dial: [phone]   Call Center Hours:  Oct 1 - Mar 31, Mon - Fri 7 AM to 8 PM PST  Oct 1 - Mar 31, Sat - Sun 8 AM to 8 PM PST  Apr 1 - Sep 30, Mon - Fri 7 AM to 8 PM PST   Office Hours: Mon - Fri 8 AM to 5 PM PST   E-mail: [email or web address]   Website:  [email or web address]      This information is available for free in other languages. Please contact Customer Service at the phone  number above for more information. [facility] complies with applicable Federal civil rights laws and does not discriminate on the basis of race, color, national origin, age, disability or sex.    Sincerely,     Healthcare Utilization Management Department     Cc: [facility]   Timothy Mcclellan    Multi-Language Insert  Multi- Services  English: We have free  services to answer any questions you may have about our health or drug plan.  To get an , just call us at [phone].  Someone who speaks English/Language can help you.  This is a free service.  Hebrew: Tenemos servicios de intérprete sin costo alguno  para responder cualquier pregunta que pueda tener sobre nuestro plan de erika o medicamentos. Para hablar con un intérprete, por favor llame al [phone]. Alguien que hable español le podrá ayudar. Josie es un servicio gratuito.  Chinese Mandarin: ?????????????????????????????? ???????????????? [phone]????????????????? ?????????  Chinese Cantonese: ?????????????????????????????? ????????????? [phone]???????????????????? ????????  Tagalog:  Radha bernal serbisyo sa chavezsasaling-lili oneilnggil sa sheron palma o panggamot.  Dano leonardo tagasalingclaudine arambula sa [phone]. Daksha leonardo Tagalog.  Gerry ames.  Frisian:  Nous proposons juan a services gratuits d'interprétation pour répondre à toutes justina questions relatives à notre régime de santé ou d'assurance-médicaments. Pour accéder au service d'interprétation, il vous suffit de nous appeler au [phone]. Un interlocuteur parlant Français pourra vous aider. Ce service est gratuit.  Urdu:  Jmimy martínez có d?ch v? thông d?ch mi?n phí ð? tr? l?i các câu h?i v? chýõng s?c kh?e và chýõng trìn thu?c men. N?u quí v? c?n thông d?ch viên paulina  g?i [phone] s? có nhân viên nói ti?ng Vi?t giúp ð? quí v?. Ðây là d?ch v? mi?n phí .  Swiss:  Unser kostenser Dolmetscherservice beantwortet Ihren Fragen zu unserem Gesundheits- und Arzneimittelplan. Unsere Dolmetscher erreichen Sie [phone]. Man wird Ihnen adarsh auf [facility]. Dieser Service ist [facility].  Chinese:  ??? ?? ?? ?? ?? ??? ?? ??? ?? ???? ?? ?? ???? ???? ????. ?? ???? ????? ?? [phone] ??? ??? ????.  ???? ?? ???? ?? ?? ????. ? ???? ??? ?????.   Maldivian: Åñëè ó âàñ âîçíèêíóò âîïðîñû îòíîñèòåëüíî ñòðàõîâîãî èëè ìåäèêàìåíòíîãî ïëàíà, âû ìîæåòå âîñïîëüçîâàòüñÿ íàøèìè áåñïëàòíûìè óñëóãàìè ïåðåâîä÷èêîâ. ×òîáû âîñïîëüçîâàòüñÿ óñëóãàìè ïåðåâîä÷èêà, ïîçâîíèòå íàì ïî òåëåôîíó [phone]. Âàì îêàæåò ïîìîùü ñîòðóäíèê, êîòîðûé ãîâîðèò ïî-póññêè. Äàííàÿ óñëóãà áåñïëàòíàÿ.  Ukrainian: ÅääÇ äÞÏã ÎÏãÇÊ ÇáãÊÑÌã ÇáÝæÑí ÇáãÌÇäíÉ ááÅÌÇÈÉ Úä Ãí ÃÓÆáÉ ÊÊÚáÞ ÈÇáÕÍÉ Ãæ ÌÏæá ÇáÃÏæíÉ áÏíäÇ. ááÍÕæá Úáì ãÊÑÌã ÝæÑí¡ áíÓ Úáíß Óæì ÇáÇÊÕÇá ÈäÇ Úáì [phone] . ÓíÞæã ÔÎÕ ãÇ íÊÍÏË ÇáÚÑÈíÉ ÈãÓÇÚÏÊß. åÐå ÎÏãÉ ãÌÇäíÉ.  Shiva: ????? ????????? ?? ??? ?? ????? ?? ???? ??? ???? ???? ?? ?????? ?? ???? ???? ?? ??? ????? ??? ????? ???????? ?????? ?????? ???. ?? ???????? ??????? ???? ?? ???, ?? ???? [phone] ?? ??? ????. ??? ??????? ?? ?????? ????? ?? ???? ??? ?? ???? ??. ?? ?? ????? ???? ??.   Persian:  È disponibile un servizio di interpretariato gratuito per rispondere a eventuali domande sul nostro piano sanitario e farmaceutico. Per un interprete, contattare il caryn [phone]. Un nostro incaricato john parla Italianovi fornirà l'assistenza necessaria. È un servizio gratuito.  Portugués:  Dispomos de serviços de interpretação gratuitos para responder a qualquer questão que tenha acerca do nosso plano de saúde ou de medicação. Para obter um intérprete, contacte-nos através do número [phone]. Irá encontrar alguém que fale o idioma  Português para o ajudar. Josie serviço é gratuito.  Guyanese  Creole:  Nou genyen sèvis entèprèt gratis janny reponn tout kesyon ou ta genyen konsènan plan medikal oswa dwòg nou an.  Janny jwenn yon entèprèt, jis rele nou nan [phone]. Yon moun ki pale Kreyòl kapab andreia w.  Sa a se yon sèvis ki gratis.  Polish:  Umo¿liwiamy bezp³atne skorzystanie z us³ug t³umacza ustnego, który pomo¿e w uzyskaniu odpowiedzi na temat planu zdrowotnego lub dawkowania leków. Radha skorzystaæ z pomocy t³umacza znaj¹cego sukhdev graf¿y zadzwoniæ pod numer [phone]. Ta us³uga jest bezp³atna.  Monegasque: ????? ??????? ????????????????????? ??????????????????????????????????[phone] ???????????????? ? ????????????????? ?????

## 2025-05-20 ENCOUNTER — TELEPHONE (OUTPATIENT)
Dept: HEALTH INFORMATION MANAGEMENT | Facility: OTHER | Age: 80
End: 2025-05-20
Payer: MEDICARE

## 2025-06-02 ENCOUNTER — TELEPHONE (OUTPATIENT)
Dept: VASCULAR LAB | Facility: MEDICAL CENTER | Age: 80
End: 2025-06-02
Payer: MEDICARE

## 2025-06-02 ENCOUNTER — HOSPITAL ENCOUNTER (OUTPATIENT)
Dept: CARDIOLOGY | Facility: MEDICAL CENTER | Age: 80
End: 2025-06-02
Attending: STUDENT IN AN ORGANIZED HEALTH CARE EDUCATION/TRAINING PROGRAM
Payer: MEDICARE

## 2025-06-02 DIAGNOSIS — I63.9 CEREBROVASCULAR ACCIDENT (CVA), UNSPECIFIED MECHANISM (HCC): Chronic | ICD-10-CM

## 2025-06-02 DIAGNOSIS — R07.89 OTHER CHEST PAIN: Chronic | ICD-10-CM

## 2025-06-02 DIAGNOSIS — R79.89 ELEVATED BRAIN NATRIURETIC PEPTIDE (BNP) LEVEL: ICD-10-CM

## 2025-06-02 LAB
LV EJECT FRACT  99904: 67
LV EJECT FRACT MOD 2C 99903: 68.03
LV EJECT FRACT MOD 4C 99902: 69.11
LV EJECT FRACT MOD BP 99901: 67.59

## 2025-06-02 PROCEDURE — 93306 TTE W/DOPPLER COMPLETE: CPT

## 2025-06-02 PROCEDURE — 93306 TTE W/DOPPLER COMPLETE: CPT | Mod: 26 | Performed by: INTERNAL MEDICINE

## 2025-06-02 NOTE — TELEPHONE ENCOUNTER
Called patient regarding NP appointment with Dr. Moreno. Called to confirm if this will be first time patient is Vascular Med provider and review if any recent testing completed or hospital admissions. No answer, left voicemail to call back.    Correct appointment type? Yes  Referral attached to appointment? Yes  New patient packet sent via [employer]?  Yes

## 2025-06-03 PROBLEM — I63.9 CVA (CEREBRAL VASCULAR ACCIDENT) (HCC): Chronic | Status: RESOLVED | Noted: 2022-09-18 | Resolved: 2025-06-03

## 2025-06-03 NOTE — RESULT ENCOUNTER NOTE
Phone Number Called: [phone]  Call outcome: Left detailed message for patient. Informed to call back with any additional questions.  Message: Called to inform patient of normal echocardiogram results with no changes from prior echo. Notified pt that there are no current changes to medications or treatment. Provided phone number to call back if she has any questions.

## 2025-06-04 ENCOUNTER — APPOINTMENT (OUTPATIENT)
Dept: VASCULAR LAB | Facility: MEDICAL CENTER | Age: 80
End: 2025-06-04
Attending: FAMILY MEDICINE
Payer: MEDICARE

## 2025-06-05 ENCOUNTER — OFFICE VISIT (OUTPATIENT)
Dept: VASCULAR LAB | Facility: MEDICAL CENTER | Age: 80
End: 2025-06-05
Attending: FAMILY MEDICINE
Payer: MEDICARE

## 2025-06-05 VITALS
DIASTOLIC BLOOD PRESSURE: 62 MMHG | HEIGHT: 62 IN | BODY MASS INDEX: 27.86 KG/M2 | WEIGHT: 151.4 LBS | HEART RATE: 60 BPM | SYSTOLIC BLOOD PRESSURE: 161 MMHG

## 2025-06-05 DIAGNOSIS — I70.0 AORTIC ATHEROSCLEROSIS (HCC): ICD-10-CM

## 2025-06-05 DIAGNOSIS — E03.8 OTHER SPECIFIED HYPOTHYROIDISM: ICD-10-CM

## 2025-06-05 DIAGNOSIS — E11.21 TYPE 2 DIABETES MELLITUS WITH DIABETIC NEPHROPATHY, WITH LONG-TERM CURRENT USE OF INSULIN (HCC): Chronic | ICD-10-CM

## 2025-06-05 DIAGNOSIS — G31.9 ACQUIRED CEREBRAL ATROPHY (HCC): ICD-10-CM

## 2025-06-05 DIAGNOSIS — Z79.4 TYPE 2 DIABETES MELLITUS WITH DIABETIC NEPHROPATHY, WITH LONG-TERM CURRENT USE OF INSULIN (HCC): Chronic | ICD-10-CM

## 2025-06-05 DIAGNOSIS — Z79.02 LONG TERM (CURRENT) USE OF ANTITHROMBOTICS/ANTIPLATELETS: ICD-10-CM

## 2025-06-05 DIAGNOSIS — Z86.73 HISTORY OF ISCHEMIC STROKE WITHOUT RESIDUAL DEFICITS: Chronic | ICD-10-CM

## 2025-06-05 DIAGNOSIS — I10 HYPERTENSION, UNSPECIFIED TYPE: Primary | ICD-10-CM

## 2025-06-05 DIAGNOSIS — E11.40 NEUROPATHY DUE TO TYPE 2 DIABETES MELLITUS (HCC): ICD-10-CM

## 2025-06-05 DIAGNOSIS — K55.1 ATHEROSCLEROSIS OF SUPERIOR MESENTERIC ARTERY (HCC): ICD-10-CM

## 2025-06-05 DIAGNOSIS — E78.5 DYSLIPIDEMIA: ICD-10-CM

## 2025-06-05 DIAGNOSIS — N18.4 CKD STAGE G4/A3, GFR 15-29 AND ALBUMIN CREATININE RATIO >300 MG/G (HCC): Chronic | ICD-10-CM

## 2025-06-05 DIAGNOSIS — I63.81 LACUNAR INFARCTION (HCC): ICD-10-CM

## 2025-06-05 PROBLEM — N25.81 SECONDARY HYPERPARATHYROIDISM OF RENAL ORIGIN (HCC): Status: ACTIVE | Noted: 2023-07-10

## 2025-06-05 PROCEDURE — 3077F SYST BP >= 140 MM HG: CPT | Performed by: FAMILY MEDICINE

## 2025-06-05 PROCEDURE — G2211 COMPLEX E/M VISIT ADD ON: HCPCS | Performed by: FAMILY MEDICINE

## 2025-06-05 PROCEDURE — 3078F DIAST BP <80 MM HG: CPT | Performed by: FAMILY MEDICINE

## 2025-06-05 PROCEDURE — 99212 OFFICE O/P EST SF 10 MIN: CPT | Performed by: FAMILY MEDICINE

## 2025-06-05 PROCEDURE — 99204 OFFICE O/P NEW MOD 45 MIN: CPT | Performed by: FAMILY MEDICINE

## 2025-06-05 RX ORDER — TORSEMIDE 5 MG/1
5 TABLET ORAL EVERY MORNING
Qty: 100 TABLET | Refills: 3 | Status: SHIPPED | OUTPATIENT
Start: 2025-06-05

## 2025-06-05 ASSESSMENT — ENCOUNTER SYMPTOMS
CLAUDICATION: 0
WHEEZING: 0
CHILLS: 0
HEMOPTYSIS: 0
PALPITATIONS: 0
SHORTNESS OF BREATH: 0
COUGH: 0
PND: 0
ORTHOPNEA: 0
SPUTUM PRODUCTION: 0
FEVER: 0

## 2025-06-05 ASSESSMENT — FIBROSIS 4 INDEX: FIB4 SCORE: 4.22

## 2025-06-05 NOTE — PROGRESS NOTES
INITIAL VISIT  VASCULAR MEDICINE CLINIC   25     Pam Hernandez, ref for eval/mgmt of dyslipidemia, est 2025  Referral Source: Nichole Hill APRN (cardiology)     Subjective    Barriers to care/SDOH: none    Initial visit history: seen with cardiology APRN 2025 after ED eval for CP in 3/2025.  Had negative w/u including EKG, labs.   Recent echo was normal.    Had been on water pills the recent past due to BLE edema that helped but has stopped since.  Denies LANDIS.   Home BP los/70s  Reported at clinic today 110s    CVA: unclear when occurred, denies prior sx.  Had MR 2018 w/o infarcts and repeat  with lacunar nad L cerebellar, occipital infarcts.  No eval of carotid or post circ since event.        HLD: currently on zetia and prior atorva which she has stopped in the past.     HTN: Stable, tolerating meds with good adherence,     T2D: Stable. Seeing DM clinic. Tolerating meds and no recent med changes. Her a1c have been hovering in the 8-9 range in the past but most recently 6.9 in 2025.       ANTITHROMBOTIC TX: none  - no hx of bleeding        Problem List[1]   has a past surgical history that includes us-needle core bx-breast panel; cataract extraction with iol (Bilateral); eye surgery (Left, ); vitrectomy posterior (Right, 2018); and baron by laparoscopy (N/A, 10/18/2019).  Medications Ordered Prior to Encounter[2]   Allergies[3]   Family History   Problem Relation Age of Onset    Coronary artery disease Mother      Social History[4]  Review of Systems   Constitutional:  Negative for chills, fever and malaise/fatigue.   Respiratory:  Negative for cough, hemoptysis, sputum production, shortness of breath and wheezing.    Cardiovascular:  Negative for chest pain, palpitations, orthopnea, claudication, leg swelling and PND.         Objective    Vitals:    25 1306 25 1309   BP: (!) 159/66 (!) 161/62   BP Location: Left arm Left arm   Patient Position: Sitting  "Sitting   BP Cuff Size: Adult Adult   Pulse: 61 60   Weight: 68.7 kg (151 lb 6.4 oz)    Height: 1.575 m (5' 2\")      BP Readings from Last 5 Encounters:   06/05/25 (!) 161/62   04/30/25 136/70   04/22/25 (!) 183/50   04/15/25 (!) 181/48   03/31/25 (!) 183/60     BMI Readings from Last 1 Encounters:   06/05/25 27.69 kg/m²     Wt Readings from Last 3 Encounters:   06/05/25 68.7 kg (151 lb 6.4 oz)   04/30/25 70.8 kg (156 lb)   04/22/25 72.6 kg (160 lb)     Physical Exam  Constitutional:       General: She is not in acute distress.     Appearance: Normal appearance. She is not diaphoretic.   HENT:      Head: Normocephalic and atraumatic.   Eyes:      Conjunctiva/sclera: Conjunctivae normal.   Cardiovascular:      Rate and Rhythm: Normal rate and regular rhythm.      Pulses:           Carotid pulses are 2+ on the right side and 2+ on the left side with bruit.       Radial pulses are 2+ on the right side and 2+ on the left side.        Dorsalis pedis pulses are 2+ on the right side and 2+ on the left side.        Posterior tibial pulses are 2+ on the right side and 2+ on the left side.      Heart sounds: Normal heart sounds.   Pulmonary:      Effort: Pulmonary effort is normal.      Breath sounds: Normal breath sounds.   Musculoskeletal:      Right lower leg: No edema.      Left lower leg: No edema.   Skin:     General: Skin is warm and dry.   Neurological:      Mental Status: She is alert and oriented to person, place, and time.      Cranial Nerves: No cranial nerve deficit.      Gait: Gait is intact.   Psychiatric:         Mood and Affect: Mood and affect normal.       DATA REVIEW:  Most Recent Lipid Panel:   Lab Results   Component Value Date/Time    CHOLSTRLTOT 161 04/17/2025 12:00 AM    LDL 82 04/17/2025 12:00 AM    HDL 54 04/17/2025 12:00 AM    TRIGLYCERIDE 148 04/17/2025 12:00 AM     Lab Results   Component Value Date/Time    LDL 82 04/17/2025 12:00 AM     (H) 09/11/2022 01:11 AM    LDL 76 04/28/2016 03:38 " "AM      No results found for: \"LIPOPROTA\"   No results found for: \"APOB\"   No results found for: \"CRPHIGHSEN\"      Lab Results   Component Value Date    SODIUM 136 2025    POTASSIUM 5.3 2025    CHLORIDE 107 2025    CO2 18 (L) 2025    ANION 11.0 2025    GLUCOSE 151 (H) 2025    BUN 32 (H) 2025    CREATININE 1.91 (H) 2025    CALCIUM 8.7 2025    ASTSGOT 25 2025    ALTSGPT 11 2025    ALKPHOSPHAT 137 (H) 2025    TBILIRUBIN 0.3 2025    ALBUMIN 3.8 2025    AGRATIO 1.3 2025     Lab Results   Component Value Date/Time    HBA1C 8.6 (H) 2022 02:00 PM    HBA1C 9.5 (A) 2019 09:15 AM    HBA1C 8.3 (H) 2019 06:54 PM      No results found for: \"MICROALBCALC\", \"MALBCRT\", \"MALBEXCR\", \"XMPYGA03\", \"MICROALBUR\", \"MICRALB\", \"UMICROALBUM\", \"MICROALBTIM\"      Latest Reference Range & Units 09/10/22 09:51   Cortisol 0.0 - 23.0 ug/dL 11.0   TSH 0.380 - 5.330 uIU/mL 4.080     2025 [facility] labs   UACR 952  ApoB 70              IMAGIN CT a/p   Mild calcified plaque within the aorta and branches. Considerable calcified plaque at the origins of the celiac, superior mesenteric, and left renal arteries.      MR brain   1.  No acute abnormality.  2.  A few nonspecific T2 hyperintensities are noted in the subcortical and periventricular white matter.  3.  Mild cerebral and cerebellar volume loss.     MR brain   1.  No evidence of acute territorial infarct, intracranial hemorrhage or mass lesion.  2.  Chronic left paramedian pontine lacunar infarct, new from the previous exam.  3.  Redemonstrated chronic left cerebellar and left inferior medial occipital lobe infarcts.  4.  Mild diffuse cerebral substance loss.  5.  Mild microangiopathic ischemic change versus demyelination or gliosis.    2025 echo   Compared to the prior study on 9/10/2022, no changes.   Normal left ventricular systolic function.   No evidence of valvular " abnormality based on Doppler evaluation.       PROCEDURES: none          ASSESSMENT AND PLAN  1. Hypertension, unspecified type  Comp Metabolic Panel    MICROALBUMIN CREAT RATIO URINE    torsemide (DEMADEX) 5 MG Tab      2. Lacunar infarction (HCC)  US-CAROTID DOPPLER BILAT    CANCELED: US-CAROTID DOPPLER BILAT      3. Aortic atherosclerosis (HCC)  CRP HIGH SENSITIVE (CARDIAC)      4. Atherosclerosis of superior mesenteric artery (HCC)        5. Type 2 diabetes mellitus with diabetic nephropathy, with long-term current use of insulin (HCC)  HEMOGLOBIN A1C      6. CKD stage G4/A3, GFR 15-29 and albumin creatinine ratio >300 mg/g (HCC)  Comp Metabolic Panel    MICROALBUMIN CREAT RATIO URINE    torsemide (DEMADEX) 5 MG Tab      7. Neuropathy due to type 2 diabetes mellitus (HCC)        8. Acquired cerebral atrophy (HCC)        9. Dyslipidemia  Comp Metabolic Panel    Lipid Profile    MICROALBUMIN CREAT RATIO URINE    APOLIPOPROTEIN B    CRP HIGH SENSITIVE (CARDIAC)    Lipoprotein (a)      10. History of ischemic stroke without residual deficits  US-CAROTID DOPPLER BILAT    CANCELED: US-CAROTID DOPPLER BILAT      11. Other specified hypothyroidism  TSH      12. Long term (current) use of antithrombotics/antiplatelets          PATIENT TYPE: Secondary Prevention    MAJOR ASCVD:     1) hx of ischemic CVA, lacunar CVA (occurred b/t [id number] per MR imaging, pt reports no prior sx) -   2022 MR brain - L cerebellar, L occipital and L pontine lacunar   - appears to not have had complete w/u to r/o large vessel dz   - indicates mix of athero dz and small vessel CeVD.  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   Plan:  - continue secondary prevention treatment goals, intensive med mgmt and lifestyle interventions   - update carotid duplex, tobin in light of slight carotid bruit noted   - monitor for new sx     OTHER  "ESTABLISHED CVD:     1) NON-ANEURYSMAL AORTIC ATHEROSCLEROSIS per CT -  no sx, no prior reported clinical manifestations  or indications of complex features  - general indicator of systemic AS with higher potential AS in other vascular beds, possible indicator of higher overall ASCVD risk   Plan:  - no further imaging surveillance, continue med mgmt, consider antiplat tx if concomitant CAD, PAD, or both     2) CEREBROVASCULAR SMALL VESSEL DISEASE (CSVD) with hx of lacunar infarcts -  asymptomatic   - noted per prior neuroimaging, often termed \"chronic microvascular disease\"  - can manifest as mild cognitive dysfunction, dementia, mood disorders, motor and gait dysfunction, and urinary incontinence.   - It is a significant contributor to vascular cognitive impairment and dementia, accounting for a substantial proportion of these cases   - AHA/ASA highlight that CSVD accounts for 20-30% of ischemic strokes, generally as lacunar stroke syndromes and spontaneous ICH  Plan:  - avoid tobacco, control BP, continue ASA therapy   - in general, no specific surveillance needed, unless new focal neuro s/s occur  - seek prompt attention if acute focal neuro s/s      EVIDENCE OF GENETIC DYSLIPIDEMIA: No   FH / LIPIDEMIA genotyping: NO    SECONDARY CAUSES / CONTRIBUTORS: yes  Metabolic:  T2D  Thyroid disease:reported hx of hypothryoidism but not currently on meds   Liver disease: none reported   Renal disease/nephrotic syndrome:  advanced CKD, raises TG baseline  Dietary-induced (ketogenic, lean mass hyper-responder)? no  Medications: None  Alcohol use: N\A    ACC/AHA INDICATION FOR STATIN THERAPY:  Diabetes and Long duration (T2D >10y, T1D >20y), UACR >30, eGFR <60, and Neuropathy    LIPID TARGETS / GOALS   - using lower goals due to high risk T2D  As of 6/2025?  LDL-C <70 mg/dl   NOT MET = 82  apoB <70 mg/dl  MET - more important marker    LIFESTYLE INTERVENTIONS  TOBACCO:    reports that she has never smoked. She has never used " smokeless tobacco.   - continued complete avoidance of all tobacco products   PHYSICAL ACTIVITY: >150min/week of mod-intensity activity or as much as tolerated  NUTRITION: reduce Na to <1,500mg/day and Diabetic diet - reduce CHO and kcal    ETOH: does not drink  WT MGMT: maintain healthy weight      LIPID-LOWERING MEDICATION MANAGEMENT:     Statin Therapy:   Not currently indicated , consider restart of statin based upon future labs     Non-Statin Therapies:     LDL/apoB therapies:  EZETIMIBE: continue 10mg daily   BEMPEDOIC ACID: not currently indicated   BAS: not currently indicated   PCSK9 mAb: candidate   INCLISIRAN: candidate     TG therapies: not currently indicated , though consider icosapent if TGs >150 in future due to high risk T2D    LP(a) modifying therapy: available in clinical research trials only at this time     PCSK9i strategy indications: Not currently indicated    RECOMMENDED SUPPLEMENTS: None     APHERESIS: n/a     BLOOD PRESSURE MANAGEMENT  - presumed primary contributor is advanced CKD with relative hypervolemia and augmented RAAS, SNS drive  Office BP goal per ACC/AHA <130/80   Home BP at goal: MET  Office BP at goal: NOT MET - stage 2 baseline, reports some degree of WHC   24h ABPM:  not ordered to date   RDN candidate? NO, advanced ckd   Contributing factors: advanced CKD   Plan:   - start/continue home BP monitoring  - at initial visit, reviewed correct technique, provided BP log and instructions  - order 24h ABPM:  consider if continued home vs office discrepancies   - routine monitoring of lytes/gfr as needed with med changes   - lifestyle mgmt: as noted and maintain adequate daily hydration >60oz/day to avoid potential for hypovolemia and orthostatic sx   MEDS:  - continue lisinopril 20mg daily  - continue amlodipine 2.5mg daily   - reports furosemide raised her BP in the past   - suggested for trial of torsemide 5mg qAM for BP mgmt in light of relative hypervolemia from CKD G4  - could  "consider chlorthalidone 25mg 1/2 tab daily vs 25mg qod as per CLICK trial (2021) for avg 13 pt SBP reduction in CKD G4 pts, would require continued close monitoring of lytes/gfr    GLYCEMIC MANAGEMENT: Diabetic, T2 with DM nephropathy  Goal A1c < 7.5 reasonable due to age  - last a1c 6.9 in 4/2025   Lab Results   Component Value Date/Time    HBA1C 8.6 (H) 09/09/2022 02:00 PM    HBA1C 9.5 (A) 06/17/2019 09:15 AM    HBA1C 8.3 (H) 01/22/2019 06:54 PM       No results found for: \"MICROALBCALC\", \"MALBCRT\", \"MALBEXCR\", \"DOUODK23\", \"MICROALBUR\", \"MICRALB\", \"UMICROALBUM\", \"MICROALBTIM\"  Plan:  - continue current medication plan   - recommmend for routine care with PCP (or endocrine) to include regular A1c monitoring, annual albumin/creatinine ratio (ACR), annual diabetic retinopathy screening, foot exams, annual flu vaccine, and updates to pneumonia vaccines as appropriate      ANTITHROMBOTIC THERAPY: continue ASA 81mg daily due to hx of CVA     OTHER:    # CKD G4/A3, stable, presumed d/t T2D   Plan:  - avoid nephrotoxins, maintain adequate hydration   - continue HTN control with RAAS blockade   - monitor lytes/gfr routinely  - ongoing care with nephrology for lytes/gfr mgmt, prep for renal replacement tx if needed     STUDIES:  Now - carotid duplex  FOLLOW-UP: 6 weeks    Time: 49 min - - chart review/prep, review of other providers' records, imaging/lab review, face-to-face time for history/examination, ordering, prescribing,  review of results/meds/ treatment plan with patient/family/caregiver, documentation in EMR, care coordination (as needed)     Angus Moreno M.D.  DARIN, Board-certified Clinical Lipidologist   [facility]   [facility]   [phone]            [1]   Patient Active Problem List  Diagnosis    Other chest pain    DM (diabetes mellitus) (HCC)    Chronic kidney disease (CKD), stage IV (severe) (HCC)    RUQ pain    Essential hypertension    Elevated TSH    " Hypothyroidism    BMI 27.0-27.9,adult    GERD (gastroesophageal reflux disease)    Glaucoma    Peripheral vascular disease, unspecified (HCC)    Sinus bradycardia    Hypertension    Diastolic dysfunction    Dizziness    Neuropathy    Urinary incontinence    Low serum vitamin B12    Neuropathy due to type 2 diabetes mellitus (HCC)    Diabetic retinopathy (HCC)    Hearing loss    Osteoarthrosis    Osteopenia    Dyslipidemia    Hypertensive heart and chronic kidney disease with heart failure and stage 1 through stage 4 chronic kidney disease, or unspecified chronic kidney disease (HCC)    Long term (current) use of antithrombotics/antiplatelets    CKD stage G4/A3, GFR 15-29 and albumin creatinine ratio >300 mg/g (HCC)    Lacunar infarction (HCC)    History of CVA (cerebrovascular accident)    Aortic atherosclerosis (HCC)    Atherosclerosis of superior mesenteric artery (HCC)    Secondary hyperparathyroidism of renal origin (HCC)    Vitamin D deficiency    Acquired cerebral atrophy (HCC)   [2]   Current Outpatient Medications on File Prior to Visit   Medication Sig Dispense Refill    FARXIGA 10 MG Tab Take 1 Tablet by mouth every day. 100 Tablet 3    amLODIPine (NORVASC) 2.5 MG Tab Take 1 Tablet by mouth every day. 100 Tablet 3    lisinopril (PRINIVIL) 20 MG Tab Take 20 mg by mouth every day.      ezetimibe (ZETIA) 10 MG Tab Take 1 Tablet by mouth every day. 90 Tablet 1    ASPIRIN LOW DOSE 81 MG EC tablet every day.      Continuous Blood Gluc Sensor (FREESTYLE ADAL 2 SENSOR) Misc       LANTUS SOLOSTAR 100 UNIT/ML Solution Pen-injector injection Inject 5 Units under the skin every evening.      acetaminophen (TYLENOL) 325 MG Tab       diclofenac sodium (VOLTAREN) 1 % Gel       TRUE METRIX BLOOD GLUCOSE TEST strip       DROPLET PEN NEEDLES       Insulin Pen Needle 32 G x 4 mm Use to inject insulin daily as directed 100 Each 0    Syringe (BD PLASTIPAK SYRINGE) 3 ML Misc use to draw B12 injection use as directed by MD Gilbert  Each 3    Carboxymethylcellulose Sod PF 0.5 % Solution Administer 1 Drop into both eyes every 2 hours while awake.      Brimonidine Tartrate-Timolol 0.2-0.5 % Solution Administer 1 Drop into both eyes 2 times a day.      gabapentin (NEURONTIN) 100 MG Cap Take 300 mg by mouth every day.      latanoprost (XALATAN) 0.005 % Solution Place 1 Drop in both eyes every evening.       No current facility-administered medications on file prior to visit.   [3]   Allergies  Allergen Reactions    Epinephrine      Other reaction(s): Dizziness    Nalbuphine      Other reaction(s): Depression    Novocain Anaphylaxis    Pseudoephedrine      Caused depression.  Other reaction(s): Depression    Lasix [Furosemide] Unspecified     Elevated BP - per pt     Levofloxacin      Other reaction(s): Pruritic rash   [4]   Social History  Tobacco Use    Smoking status: Never    Smokeless tobacco: Never   Vaping Use    Vaping status: Never Used   Substance Use Topics    Alcohol use: No    Drug use: No

## 2025-06-09 ENCOUNTER — APPOINTMENT (OUTPATIENT)
Dept: CARDIOLOGY | Facility: MEDICAL CENTER | Age: 80
End: 2025-06-09
Attending: STUDENT IN AN ORGANIZED HEALTH CARE EDUCATION/TRAINING PROGRAM
Payer: MEDICARE

## 2025-06-09 PROBLEM — E11.65 TYPE 2 DIABETES MELLITUS WITH HYPERGLYCEMIA, WITHOUT LONG-TERM CURRENT USE OF INSULIN (HCC): Status: ACTIVE | Noted: 2019-01-23

## 2025-06-09 PROBLEM — E11.59 HYPERTENSION ASSOCIATED WITH TYPE 2 DIABETES MELLITUS (HCC): Status: ACTIVE | Noted: 2019-01-23

## 2025-06-09 PROBLEM — E11.59 HYPERTENSION ASSOCIATED WITH TYPE 2 DIABETES MELLITUS (HCC): Status: ACTIVE | Noted: 2022-09-10

## 2025-06-09 PROBLEM — N18.4 CKD STAGE G4/A3, GFR 15-29 AND ALBUMIN CREATININE RATIO >300 MG/G (HCC): Status: ACTIVE | Noted: 2019-01-23

## 2025-06-09 PROBLEM — I15.2 HYPERTENSION ASSOCIATED WITH TYPE 2 DIABETES MELLITUS (HCC): Status: ACTIVE | Noted: 2022-09-10

## 2025-06-09 PROBLEM — I15.2 HYPERTENSION ASSOCIATED WITH TYPE 2 DIABETES MELLITUS (HCC): Status: ACTIVE | Noted: 2019-01-23

## 2025-06-09 NOTE — ASSESSMENT & PLAN NOTE
Chronic, stable. Reviewed lipid profile from April 2025. Currently taking ezetimibe 10 mg daily. Provided education on following a heart healthy diet with adequate intake of fruits, vegetables, and whole grains. Encourage 30 minutes of moderate exercise 3-4 times a week. Denies chest pain and claudication. Monitored by primary care provider.

## 2025-06-09 NOTE — ASSESSMENT & PLAN NOTE
Chronic, stable. In-office hemoglobin A1C is 7.1 today. Currently taking farxiga 10 mg daily and 5 units of lantus nightly. Followed by primary care provider.  --In-office hemoglobin A1C ordered.

## 2025-06-09 NOTE — ASSESSMENT & PLAN NOTE
Chronic, stable. Most recent GFR was 26 in March 2025. Provided education on avoiding high-dose NSAIDs. Encourage a low-protein diet. Limit sodium intake to less than 2 grams/day. Encourage at least 64 ounces of water intake daily. Routinely monitored by primary care provider.

## 2025-06-09 NOTE — ASSESSMENT & PLAN NOTE
"Chronic, stable. Reviewed MRI of the brain from September 2022 with \"chronic left paramedian pontine lacunar infarct.\" Denies loss of coordination, weakness, and difficulty speaking. Monitored by primary care provider.  "

## 2025-06-09 NOTE — ASSESSMENT & PLAN NOTE
Chronic, stable. Reviewed DEXA scan from 2014. Encourage weight-bearing activity. Denies recent falls or fractures. Followed by primary care provider.

## 2025-06-09 NOTE — ASSESSMENT & PLAN NOTE
Chronic, stable. Reviewed MRI of the brain from September 2022 with mild atrophy. Denies concerns regarding memory. Monitored by primary care provider.

## 2025-06-09 NOTE — ASSESSMENT & PLAN NOTE
Chronic, stable. Currently taking amlodipine 2.5 mg, furosemide 20 mg, lisinopril 20 mg, and torsemide 5 mg daily. In-office blood pressure is 120/50. Denies chest pain, lightheadedness, and lower extremity edema. Continue to follow with cardiology, Nichole BECK, for medication and symptom management.

## 2025-06-10 ENCOUNTER — RESULTS FOLLOW-UP (OUTPATIENT)
Dept: MEDICAL GROUP | Facility: MEDICAL CENTER | Age: 80
End: 2025-06-10

## 2025-06-10 ENCOUNTER — OFFICE VISIT (OUTPATIENT)
Dept: MEDICAL GROUP | Facility: MEDICAL CENTER | Age: 80
End: 2025-06-10
Payer: MEDICARE

## 2025-06-10 VITALS
HEART RATE: 70 BPM | SYSTOLIC BLOOD PRESSURE: 120 MMHG | WEIGHT: 151 LBS | HEIGHT: 65 IN | DIASTOLIC BLOOD PRESSURE: 50 MMHG | OXYGEN SATURATION: 99 % | BODY MASS INDEX: 25.16 KG/M2 | TEMPERATURE: 97.6 F

## 2025-06-10 DIAGNOSIS — E11.59 HYPERTENSION ASSOCIATED WITH TYPE 2 DIABETES MELLITUS (HCC): ICD-10-CM

## 2025-06-10 DIAGNOSIS — G31.9 ACQUIRED CEREBRAL ATROPHY (HCC): ICD-10-CM

## 2025-06-10 DIAGNOSIS — I63.81 LACUNAR INFARCTION (HCC): ICD-10-CM

## 2025-06-10 DIAGNOSIS — E11.319 DIABETIC RETINOPATHY ASSOCIATED WITH TYPE 2 DIABETES MELLITUS, MACULAR EDEMA PRESENCE UNSPECIFIED, UNSPECIFIED LATERALITY, UNSPECIFIED RETINOPATHY SEVERITY (HCC): ICD-10-CM

## 2025-06-10 DIAGNOSIS — E11.69 DYSLIPIDEMIA ASSOCIATED WITH TYPE 2 DIABETES MELLITUS (HCC): ICD-10-CM

## 2025-06-10 DIAGNOSIS — M85.859 OSTEOPENIA OF HIP, UNSPECIFIED LATERALITY: ICD-10-CM

## 2025-06-10 DIAGNOSIS — E26.1 SECONDARY ALDOSTERONISM (HCC): ICD-10-CM

## 2025-06-10 DIAGNOSIS — N18.4 CKD STAGE G4/A3, GFR 15-29 AND ALBUMIN CREATININE RATIO >300 MG/G (HCC): ICD-10-CM

## 2025-06-10 DIAGNOSIS — E78.5 DYSLIPIDEMIA ASSOCIATED WITH TYPE 2 DIABETES MELLITUS (HCC): ICD-10-CM

## 2025-06-10 DIAGNOSIS — E11.40 NEUROPATHY DUE TO TYPE 2 DIABETES MELLITUS (HCC): ICD-10-CM

## 2025-06-10 DIAGNOSIS — I15.2 HYPERTENSION ASSOCIATED WITH TYPE 2 DIABETES MELLITUS (HCC): ICD-10-CM

## 2025-06-10 DIAGNOSIS — Z79.4 TYPE 2 DIABETES MELLITUS WITH HYPERGLYCEMIA, WITH LONG-TERM CURRENT USE OF INSULIN (HCC): ICD-10-CM

## 2025-06-10 DIAGNOSIS — E11.65 TYPE 2 DIABETES MELLITUS WITH HYPERGLYCEMIA, WITH LONG-TERM CURRENT USE OF INSULIN (HCC): ICD-10-CM

## 2025-06-10 LAB
HBA1C MFR BLD: 7.1 % (ref ?–5.8)
POCT INT CON NEG: NEGATIVE
POCT INT CON POS: POSITIVE

## 2025-06-10 PROCEDURE — 1126F AMNT PAIN NOTED NONE PRSNT: CPT

## 2025-06-10 PROCEDURE — G0439 PPPS, SUBSEQ VISIT: HCPCS

## 2025-06-10 PROCEDURE — 3074F SYST BP LT 130 MM HG: CPT

## 2025-06-10 PROCEDURE — 83036 HEMOGLOBIN GLYCOSYLATED A1C: CPT

## 2025-06-10 PROCEDURE — 3078F DIAST BP <80 MM HG: CPT

## 2025-06-10 RX ORDER — FUROSEMIDE 20 MG/1
20 TABLET ORAL DAILY
COMMUNITY

## 2025-06-10 SDOH — ECONOMIC STABILITY: FOOD INSECURITY: HOW HARD IS IT FOR YOU TO PAY FOR THE VERY BASICS LIKE FOOD, HOUSING, MEDICAL CARE, AND HEATING?: SOMEWHAT HARD

## 2025-06-10 SDOH — ECONOMIC STABILITY: TRANSPORTATION INSECURITY: IN THE PAST 12 MONTHS, HAS LACK OF TRANSPORTATION KEPT YOU FROM MEDICAL APPOINTMENTS OR FROM GETTING MEDICATIONS?: NO

## 2025-06-10 SDOH — ECONOMIC STABILITY: FOOD INSECURITY: WITHIN THE PAST 12 MONTHS, THE FOOD YOU BOUGHT JUST DIDN'T LAST AND YOU DIDN'T HAVE MONEY TO GET MORE.: NEVER TRUE

## 2025-06-10 SDOH — ECONOMIC STABILITY: FOOD INSECURITY: WITHIN THE PAST 12 MONTHS, YOU WORRIED THAT YOUR FOOD WOULD RUN OUT BEFORE YOU GOT THE MONEY TO BUY MORE.: NEVER TRUE

## 2025-06-10 ASSESSMENT — ACTIVITIES OF DAILY LIVING (ADL)
BATHING_REQUIRES_ASSISTANCE: 0
LACK_OF_TRANSPORTATION: NO

## 2025-06-10 ASSESSMENT — FIBROSIS 4 INDEX: FIB4 SCORE: 4.22

## 2025-06-10 ASSESSMENT — ENCOUNTER SYMPTOMS: GENERAL WELL-BEING: FAIR

## 2025-06-10 ASSESSMENT — PAIN SCALES - GENERAL: PAINLEVEL_OUTOF10: NO PAIN

## 2025-06-10 ASSESSMENT — PATIENT HEALTH QUESTIONNAIRE - PHQ9: CLINICAL INTERPRETATION OF PHQ2 SCORE: 0

## 2025-06-10 NOTE — ASSESSMENT & PLAN NOTE
Chronic, ongoing. Continues on brimonidine tartrate-timolol and latanoprost eyedrops daily. Reports progressive changes in vision with intermittent floaters. Routinely monitored by opthalmology, Dr. Inman.

## 2025-06-10 NOTE — ASSESSMENT & PLAN NOTE
Chronic, ongoing. Continues on brimonidine tartrate-timolol and latanoprost eyedrops daily. Reports progressive changes in vision and intermittent floaters. Routinely monitored by opthalmology, Dr. Inman.

## 2025-06-10 NOTE — PROGRESS NOTES
Comprehensive Health Assessment Program     Pam Hernandez is a 79 y.o. here for her comprehensive health assessment.    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    Long term (current) use of antithrombotics/antiplatelets 06/05/2025    Lacunar infarction (HCC) 06/05/2025    History of CVA (cerebrovascular accident) 06/05/2025    Aortic atherosclerosis ([facility]) 06/05/2025    Atherosclerosis of superior mesenteric artery ([facility]) 06/05/2025    Acquired cerebral atrophy ([facility]) 06/05/2025    Hypertensive heart and chronic kidney disease with heart failure and stage 1 through stage 4 chronic kidney disease, or unspecified chronic kidney disease ([facility]) 03/19/2025    Secondary hyperparathyroidism of renal origin ([facility]) 07/10/2023    Low serum vitamin B12 09/11/2022    Sinus bradycardia 09/10/2022    Diastolic dysfunction 09/10/2022    Dizziness 09/10/2022    Neuropathy 09/10/2022    Urinary incontinence 09/10/2022    BMI 27.0-27.9,adult 07/14/2022    GERD (gastroesophageal reflux disease) 07/14/2022    Glaucoma 07/14/2022    Peripheral vascular disease, unspecified ([facility]) 07/14/2022    Hearing loss 02/05/2020    Neuropathy due to type 2 diabetes mellitus ([facility]) 09/06/2019    Hypothyroidism 04/10/2019    Elevated TSH 01/24/2019    Type 2 diabetes mellitus with hyperglycemia, with long-term current use of insulin ([facility]) 01/23/2019    RUQ pain 01/23/2019    Hypertension associated with type 2 diabetes mellitus ([facility]) 01/23/2019    CKD stage G4/A3, GFR 15-29 and albumin creatinine ratio >300 mg/g ([facility]) 01/23/2019    Other chest pain 04/27/2016    Osteoarthrosis 10/29/2015    Diabetic retinopathy ([facility]) 10/05/2015    Osteopenia 10/05/2015    Dyslipidemia associated with type 2 diabetes mellitus ([facility]) 10/05/2015    Vitamin D deficiency 10/05/2015       Current Medications[1]       Current supplements as per medication list.     Allergies:   Epinephrine, Nalbuphine, Novocain,  Pseudoephedrine, Lasix [furosemide], and Levofloxacin  Social History[2]  Family History   Problem Relation Age of Onset    Coronary artery disease Mother      Pam  has a past medical history of Acid reflux, Acute nasopharyngitis ([id number]), Anemia, Anesthesia, Arthritis, At risk for falling, Cataract, CVA (cerebral vascular accident) ([facility]) (09/18/2022), Dental disorder, Diabetes, Elevated lipase (01/23/2019), Glaucoma, Hyperkalemia (01/23/2019), Hypertension, Hypothyroid (12/10/2018), Neuropathy ([facility]), Pneumonia, Psychiatric problem, Snoring, Urinary frequency, and Urinary incontinence.   Past Surgical History[3]    Screening:  In the last six months have you experienced any leakage of urine? Yes, reports leakage with use of absorbent pads.    Depression Screening  Little interest or pleasure in doing things?  0 - not at all  Feeling down, depressed , or hopeless? 0 - not at all  Patient Health Questionnaire Score:  0    If depressive symptoms identified deferred to follow up visit unless specifically addressed in assessment and plan.    Interpretation of PHQ-9 Total Score   Score Severity   1-4 No Depression   5-9 Mild Depression   10-14 Moderate Depression   15-19 Moderately Severe Depression   20-27 Severe Depression    Screening for Cognitive Impairment  Do you or any of your friends or family members have any concern about your memory? No  Three Minute Recall (Village, Kitchen, Baby) 2/3    Cj clock face with all 12 numbers and set the hands to show 10 minutes past 11.  No    Cognitive concerns identified deferred for follow up unless specifically addressed in assessment and plan.    Fall Risk Assessment  Has the patient had two or more falls in the last year or any fall with injury in the last year?  No    Safety Assessment  Do you always wear your seatbelt?  Yes  Any changes to home needed to function safely? No  Difficulty hearing.  Yes  Patient counseled about all safety risks that were  identified.    Functional Assessment ADLs  Are there any barriers preventing you from cooking for yourself or meeting nutritional needs?  No.    Are there any barriers preventing you from driving safely or obtaining transportation?  No.    Are there any barriers preventing you from using a telephone or calling for help?  No    Are there any barriers preventing you from shopping?  No.    Are there any barriers preventing you from taking care of your own finances?  No    Are there any barriers preventing you from managing your medications?  No    Are there any barriers preventing you from showering, bathing or dressing yourself? No    Are there any barriers preventing you from doing housework or laundry? No    Are there any barriers preventing you from using the toilet?No    Are you currently engaging in any exercise or physical activity?  Yes. Mon, wed and Fridays goes to [employer] to exercise.    Self-Assessment of Health  What is your perception of your health? Fair    Do you sleep more than six hours a night? Yes    In the past 7 days, how much did pain keep you from doing your normal work? None    Do you spend quality time with family or friends (virtually or in person)? Yes    Do you usually eat a heart healthy diet that constists of a variety of fruits, vegetables, whole grains and fiber? Yes Not much veggies.  Do you eat foods high in fat and/or Fast Food more than three times per week? No    How concerned are you that your medical conditions are not being well managed? Not at all    Are you worried that in the next 2 months, you may not have stable housing that you own, rent, or stay in as part of a household? No        Advance Care Planning  Do you have an Advance Directive, Living Will, Durable Power of , or POLST? No   Provided patient with educational brochure regarding Advance Care Planning.                Health Maintenance Summary            Current Care Gaps       Bone Density Scan  (Every 5 Years) Overdue since 8/15/2019      08/15/2014  DS-BONE DENSITY STUDY (DEXA)              COVID-19 Vaccine (7 - 2024-25 season) Overdue since 5/19/2025 11/19/2024  Imm Admin: Covid-19 Mrna (Spikevax) Moderna 12+ Years    01/26/2024  Imm Admin: Covid-19 Mrna (Spikevax) Moderna 12+ Years    06/20/2023  Imm Admin: MODERNA BIVALENT BOOSTER SARS-COV-2 VACCINE (6+)    11/03/2022  Imm Admin: MODERNA BIVALENT BOOSTER SARS-COV-2 VACCINE (6+)    06/30/2022  Imm Admin: MODERNA SARS-COV-2 VACCINE (12+)     Only the first 5 history entries have been loaded, but more history exists.            Hepatitis C Screening (Once) Never done      No completion history exists for this topic.              Diabetes: Monofilament / LE Exam (Yearly) Never done     No completion history exists for this topic.                      Needs Review       A1c Screening (Every 6 Months) Tentatively due on 12/10/2025      06/10/2025  POCT Hemoglobin A1C    06/06/2025  Outside Procedure: HEMOGLOBIN A1C    04/17/2025  Outside Procedure: HEMOGLOBIN A1C    03/06/2025  Outside Procedure: HEMOGLOBIN A1C    10/16/2024  Outside Procedure: HEMOGLOBIN A1C     Only the first 5 history entries have been loaded, but more history exists.                    Upcoming       Diabetes: Retinopathy Screening (Yearly) Next due on 10/10/2025      10/10/2024  Outside Procedure: AK EYE EXAM ESTABLISHED PT    06/26/2024  Outside Procedure: AK EYE EXAM ESTABLISHED PT    04/29/2024  Outside Procedure: AK EYE EXAM ESTABLISHED PT    02/21/2024  Outside Procedure: AK EYE EXAM, EST PATIENT,COMPREHESV    07/12/2019  Reason not specified      Only the first 5 history entries have been loaded, but more history exists.              Fasting Lipid Profile (Yearly) Next due on 4/21/2026 06/05/2025  Order placed for Lipid Profile by Angus Moreno M.D.    04/21/2025  Lipid Profile    04/17/2025  Lipid Profile    03/06/2025  Outside Procedure: LIPID PROFILE    07/17/2024   Outside Procedure: LIPID PROFILE      Only the first 5 history entries have been loaded, but more history exists.              Diabetes: Urine Protein Screening (Yearly) Next due on 6/6/2026 06/06/2025  Outside Procedure: CT MICROALBUMIN, QUANTITATIVE    04/17/2025  Outside Procedure: CT MICROALBUMIN, QUANTITATIVE    01/16/2025  Outside Procedure: URINE PROTEIN    01/16/2025  Outside Procedure: CT MICROALBUMIN, QUANTITATIVE    10/16/2024  Outside Procedure: CT MICROALBUMIN, QUANTITATIVE      Only the first 5 history entries have been loaded, but more history exists.              SERUM CREATININE (Yearly) Next due on 6/6/2026 06/06/2025  Outside Procedure: RENAL FUNCTION PANEL    04/17/2025  Outside Procedure: COMP METABOLIC PANEL    04/01/2025  Basic Metabolic Panel    03/31/2025  Outside Procedure: BASIC METABOLIC PANEL    03/03/2025  Complete Metabolic Panel (CMP)      Only the first 5 history entries have been loaded, but more history exists.              Annual Wellness Visit (Yearly) Next due on 6/10/2026      06/10/2025  Level of Service: CT ANNUAL WELLNESS VISIT-INCLUDES PPPS SUBSEQUE*    09/22/2023  Level of Service: ANNUAL WELLNESS VISIT-INCLUDES PPPS SUBSEQUE*    09/21/2023  Outside Procedure: CT ANNUAL WELLNESS VISIT-INCLUDES PPPS SUBSEQUE*    07/14/2022  Level of Service: CT ANNUAL WELLNESS VISIT-INCLUDES PPPS SUBSEQUE*    07/13/2022  Outside Procedure: CT ANNUAL WELLNESS VISIT-INCLUDES PPPS SUBSEQUE*      Only the first 5 history entries have been loaded, but more history exists.              IMM DTaP/Tdap/Td Vaccine (3 - Td or Tdap) Next due on 7/17/2034 07/17/2024  Outside Immunization: Tdap    01/15/2014  Imm Admin: Tdap Vaccine    04/22/2003  Imm Admin: TD Vaccine                      Completed or No Longer Recommended       Hepatitis B Vaccine (Hep B) (Series Information) Completed      07/31/2014  Imm Admin: Hep A/HEP B Combined Vaccine (TwinRix)    01/15/2014  Imm Admin: Hep A/HEP  B Combined Vaccine (TwinRix)    08/06/2013  Imm Admin: Hep A/HEP B Combined Vaccine (TwinRix)              Influenza Vaccine (Series Information) Completed      11/19/2024  Outside Immunization: Influenza, High Dose    10/25/2023  Outside Immunization: Fluzone High-Dose Quad    10/17/2022  Outside Immunization: Influenza Quad Adjuvanted    10/12/2021  Imm Admin: Influenza Vac Subunit Quad Inj (Pf)    10/02/2020  Imm Admin: Influenza Vaccine Adult HD      Only the first 5 history entries have been loaded, but more history exists.              Zoster (Shingles) Vaccines (Series Information) Completed      12/10/2020  Imm Admin: Zoster Vaccine Recombinant (RZV) (SHINGRIX)    03/05/2020  Imm Admin: Zoster Vaccine Recombinant (RZV) (SHINGRIX)    08/06/2013  Imm Admin: Zoster Vaccine Live (ZVL) (Zostavax) - HISTORICAL DATA              Pneumococcal Vaccine: 50+ Years (Series Information) Completed      03/05/2025  Outside Immunization: PCV20    02/15/2016  Imm Admin: Pneumococcal Conjugate Vaccine (Prevnar/PCV-13)    09/07/2013  Imm Admin: Pneumococcal polysaccharide vaccine (PPSV-23)    01/12/2010  Imm Admin: Pneumococcal polysaccharide vaccine (PPSV-23)              Hepatitis A Vaccine (Hep A) (Series Information) Aged Out      07/31/2014  Imm Admin: Hep A/HEP B Combined Vaccine (TwinRix)    01/15/2014  Imm Admin: Hep A/HEP B Combined Vaccine (TwinRix)    08/06/2013  Imm Admin: Hep A/HEP B Combined Vaccine (TwinRix)              HPV Vaccines (Series Information) Aged Out      No completion history exists for this topic.              Polio Vaccine (Inactivated Polio) (Series Information) Aged Out     No completion history exists for this topic.              Meningococcal Immunization (Series Information) Aged Out     No completion history exists for this topic.              Meningococcal B Vaccine (Series Information) Aged Out     No completion history exists for this topic.              Mammogram  Discontinued         "Frequency changed to Never automatically (Topic No Longer Applies)    11/07/2018  MA-SCREENING MAMMO BILAT W/TOMOSYNTHESIS W/CAD    07/09/2018  Reason not specified    11/23/2015  MA-DIAGNOSTIC MAMMO W/CAD-BILAT    04/03/2015  MA-UNILAT DIAGNOSTIC MAMMO W/ CAD     Only the first 5 history entries have been loaded, but more history exists.            Colorectal Cancer Screening  Discontinued        Frequency changed to Never automatically (Topic No Longer Applies)    01/02/2019  OCCULT BLOOD FECES IMMUNOASSAY                            Patient Care Team:  Pcp Pt States None as PCP - General (Family Medicine)  [facility] (Inactive)  Ermias Coleman P.A.-C. (Endocrinology)    Financial Resource Strain: Medium Risk (6/10/2025)    Overall Financial Resource Strain (CARDIA)     Difficulty of Paying Living Expenses: Somewhat hard      Transportation Needs: No Transportation Needs (6/10/2025)    PRAPARE - Transportation     Lack of Transportation (Medical): No     Lack of Transportation (Non-Medical): No      Food Insecurity: No Food Insecurity (6/10/2025)    Hunger Vital Sign     Worried About Running Out of Food in the Last Year: Never true     Ran Out of Food in the Last Year: Never true     Offered resources, declined at this time.        Encounter Vitals  Temperature: 36.4 °C (97.6 °F)  Blood Pressure : 120/50  Pulse: 70  Pulse Oximetry: 99 %  Weight: 68.5 kg (151 lb)  Height: 165.1 cm (5' 5\")  BMI (Calculated): 25.13  Pain Score: No pain     ROS:  No fever, chills, nausea, vomiting, diarrhea, chest pain or shortness of breath. See HPI.    Physical Exam  Vitals reviewed.   Constitutional:       Appearance: Normal appearance.   Cardiovascular:      Rate and Rhythm: Normal rate and regular rhythm.      Pulses: Normal pulses.      Heart sounds: Normal heart sounds.   Pulmonary:      Effort: Pulmonary effort is normal.      Breath sounds: Normal breath sounds.   Skin:     General: Skin is warm and dry. "      Capillary Refill: Capillary refill takes less than 2 seconds.   Neurological:      General: No focal deficit present.      Mental Status: She is alert and oriented to person, place, and time.   Psychiatric:         Mood and Affect: Mood normal.       Assessment and Plan. The following treatment and monitoring plan is recommended:    Acquired cerebral atrophy (HCC)  Chronic, stable. Reviewed MRI of the brain from September 2022 with mild atrophy. Denies concerns regarding memory. Followed by primary care provider.    CKD stage G4/A3, GFR 15-29 and albumin creatinine ratio >300 mg/g (HCC)  Chronic, ongoing. Most recent GFR was 26 in March 2025. Provided education on avoiding high-dose NSAIDs. Encourage a low-protein diet. Limit sodium intake to less than 2 grams/day. Encourage at least 64 ounces of water intake daily. Monitored by primary care provider.    Diabetic retinopathy (HCC)  Chronic, ongoing. Continues on brimonidine tartrate-timolol and latanoprost eyedrops daily. Reports progressive changes in vision with intermittent floaters. Routinely monitored by opthalmology, Dr. Inman.     Dyslipidemia associated with type 2 diabetes mellitus (HCC)  Chronic, stable. Reviewed lipid profile from April 2025. Currently taking ezetimibe 10 mg daily. Provided education on following a heart healthy diet with adequate intake of fruits, vegetables, and whole grains. Encourage 30 minutes of moderate exercise 3-4 times a week. Denies chest pain and claudication. Monitored by primary care provider.    Hypertension associated with type 2 diabetes mellitus (HCC)  Secondary aldosteronism (HCC)  Chronic, stable. Currently taking amlodipine 2.5 mg, furosemide 20 mg, lisinopril 20 mg, and torsemide 5 mg daily. In-office blood pressure is 120/50. Denies chest pain, lightheadedness, and lower extremity edema. Continue to follow with cardiology, Nichole BECK, for medication and symptom management.    Lacunar infarction  "(HCC)  Chronic, stable. Reviewed MRI of the brain from September 2022 with \"chronic left paramedian pontine lacunar infarct.\" Denies loss of coordination, weakness, and difficulty speaking. Followed by primary care provider.    Neuropathy due to type 2 diabetes mellitus ([facility])  Chronic, stable. Reports that symptoms are well-managed with gabapentin. Provided education on removing hazards from home including rugs and carpets, adequate lighting, and use of treaded slippers and socks. Denies recent falls. Managed by primary care provider.    Osteopenia  Chronic, stable. Reviewed DEXA scan from 2014. Encourage weight-bearing activity. Denies recent falls or fractures. Followed by primary care provider.    Type 2 diabetes mellitus with hyperglycemia, with long-term current use of insulin ([facility])  Chronic, stable. In-office hemoglobin A1C is 7.1 today. Currently taking farxiga 10 mg daily and 5 units of lantus nightly. Monitored by primary care provider.  --In-office hemoglobin A1C ordered.        Services suggested: No services needed at this time  Health Care Screening: Age-appropriate preventive services recommended by USPTF and ACIP covered by Medicare were discussed today. Services ordered if indicated and agreed upon by the patient.  Referrals offered: Community-based lifestyle interventions to reduce health risks and promote self-management and wellness, fall prevention, nutrition, physical activity, tobacco-use cessation, weight loss, and mental health services as per orders if indicated.    Discussion today about general wellness and lifestyle habits:    Prevent falls and reduce trip hazards; Cautioned about securing or removing rugs.  Have a working fire alarm and carbon monoxide detector.  Engage in regular physical activity and social activities.    Follow-up: Return for appointment with Primary Care Provider as needed.              [1]   Current Outpatient Medications   Medication Sig Dispense Refill    " furosemide (LASIX) 20 MG Tab Take 20 mg by mouth every day.      torsemide (DEMADEX) 5 MG Tab Take 1 Tablet by mouth every morning. to lower blood pressure 100 Tablet 3    FARXIGA 10 MG Tab Take 1 Tablet by mouth every day. 100 Tablet 3    amLODIPine (NORVASC) 2.5 MG Tab Take 1 Tablet by mouth every day. 100 Tablet 3    lisinopril (PRINIVIL) 20 MG Tab Take 20 mg by mouth every day.      ezetimibe (ZETIA) 10 MG Tab Take 1 Tablet by mouth every day. 90 Tablet 1    ASPIRIN LOW DOSE 81 MG EC tablet every day.      Continuous Blood Gluc Sensor (FREESTYLE ADAL 2 SENSOR) Misc       LANTUS SOLOSTAR 100 UNIT/ML Solution Pen-injector injection Inject 5 Units under the skin every evening.      acetaminophen (TYLENOL) 325 MG Tab       diclofenac sodium (VOLTAREN) 1 % Gel       TRUE METRIX BLOOD GLUCOSE TEST strip       DROPLET PEN NEEDLES       Insulin Pen Needle 32 G x 4 mm Use to inject insulin daily as directed 100 Each 0    Syringe (BD PLASTIPAK SYRINGE) 3 ML Misc use to draw B12 injection use as directed by MD 11 Each 3    Carboxymethylcellulose Sod PF 0.5 % Solution Administer 1 Drop into both eyes every 2 hours while awake.      Brimonidine Tartrate-Timolol 0.2-0.5 % Solution Administer 1 Drop into both eyes 2 times a day.      gabapentin (NEURONTIN) 100 MG Cap Take 300 mg by mouth every day.      latanoprost (XALATAN) 0.005 % Solution Place 1 Drop in both eyes every evening.       No current facility-administered medications for this visit.   [2]   Social History  Tobacco Use    Smoking status: Never    Smokeless tobacco: Never   Vaping Use    Vaping status: Never Used   Substance Use Topics    Alcohol use: No    Drug use: No   [3]   Past Surgical History:  Procedure Laterality Date    MADELINE BY LAPAROSCOPY N/A 10/18/2019    Procedure: CHOLECYSTECTOMY, LAPAROSCOPIC;  Surgeon: Kathleen Pierson M.D.;  Location: SURGERY SAME DAY [facility];  Service: General    VITRECTOMY POSTERIOR Right 12/11/2018    Procedure:  VITRECTOMY POSTERIOR-  LASER;  Surgeon: Katina Bolaños M.D.;  Location: SURGERY SAME DAY [facility];  Service: Ophthalmology    EYE SURGERY Left 2018    for glaucoma    CATARACT EXTRACTION WITH IOL Bilateral     US-NEEDLE CORE BX-BREAST PANEL

## 2025-06-10 NOTE — ASSESSMENT & PLAN NOTE
Chronic, stable. Reports that symptoms are well-managed with gabapentin. Provided education on removing hazards from home including rugs and carpets, adequate lighting, and use of treaded slippers and socks. Denies recent falls. Managed by primary care provider.

## 2025-06-12 ENCOUNTER — TELEPHONE (OUTPATIENT)
Dept: CARDIOLOGY | Facility: MEDICAL CENTER | Age: 80
End: 2025-06-12
Payer: MEDICARE

## 2025-06-12 NOTE — TELEPHONE ENCOUNTER
LVM to pt asking if they are planning on getting their labs done prior to their next appt with MINO. I let pt know that they can call lab scheduling at [phone] to schedule an appt or they can do a walk in at any of our lab locations.

## 2025-06-23 ENCOUNTER — TELEPHONE (OUTPATIENT)
Dept: CARDIOLOGY | Facility: MEDICAL CENTER | Age: 80
End: 2025-06-23
Payer: MEDICARE

## 2025-07-25 ENCOUNTER — DOCUMENTATION (OUTPATIENT)
Dept: VASCULAR LAB | Facility: MEDICAL CENTER | Age: 80
End: 2025-07-25
Payer: MEDICARE

## 2025-07-25 NOTE — PROGRESS NOTES
Established patient  Chart prep for upcoming appointment.    Any pending/incomplete orders from last visit? Yes Labs  Was patient called and reminded to complete pending orders? Yes  Were any records requested?  No    Referral up to date? Yes  Referral attached to appointment (renewals and New patients only)? N/A (established with up-to-date referral)  Virtual appointment? No            Nehal Palencia, Medical Assistant   [facility]   Ph: [phone]  Fx: [phone]

## 2025-07-31 ENCOUNTER — OFFICE VISIT (OUTPATIENT)
Dept: VASCULAR LAB | Facility: MEDICAL CENTER | Age: 80
End: 2025-07-31
Payer: MEDICARE

## 2025-07-31 VITALS
HEIGHT: 65 IN | DIASTOLIC BLOOD PRESSURE: 62 MMHG | HEART RATE: 54 BPM | WEIGHT: 148 LBS | BODY MASS INDEX: 24.66 KG/M2 | SYSTOLIC BLOOD PRESSURE: 116 MMHG

## 2025-07-31 DIAGNOSIS — I10 HYPERTENSION, UNSPECIFIED TYPE: Primary | ICD-10-CM

## 2025-07-31 DIAGNOSIS — Z79.02 LONG TERM (CURRENT) USE OF ANTITHROMBOTICS/ANTIPLATELETS: ICD-10-CM

## 2025-07-31 DIAGNOSIS — K55.1 ATHEROSCLEROSIS OF SUPERIOR MESENTERIC ARTERY (HCC): ICD-10-CM

## 2025-07-31 DIAGNOSIS — E11.21 TYPE 2 DIABETES MELLITUS WITH DIABETIC NEPHROPATHY, WITH LONG-TERM CURRENT USE OF INSULIN (HCC): ICD-10-CM

## 2025-07-31 DIAGNOSIS — Z79.4 TYPE 2 DIABETES MELLITUS WITH DIABETIC NEPHROPATHY, WITH LONG-TERM CURRENT USE OF INSULIN (HCC): ICD-10-CM

## 2025-07-31 DIAGNOSIS — Z86.73 HISTORY OF ISCHEMIC STROKE WITHOUT RESIDUAL DEFICITS: ICD-10-CM

## 2025-07-31 DIAGNOSIS — N18.4 CKD STAGE G4/A3, GFR 15-29 AND ALBUMIN CREATININE RATIO >300 MG/G (HCC): ICD-10-CM

## 2025-07-31 DIAGNOSIS — I70.0 AORTIC ATHEROSCLEROSIS (HCC): ICD-10-CM

## 2025-07-31 DIAGNOSIS — I63.81 LACUNAR INFARCTION (HCC): ICD-10-CM

## 2025-07-31 DIAGNOSIS — E78.5 DYSLIPIDEMIA: ICD-10-CM

## 2025-07-31 ASSESSMENT — ENCOUNTER SYMPTOMS
MYALGIAS: 0
WHEEZING: 0
SHORTNESS OF BREATH: 0
CHILLS: 0
HEMOPTYSIS: 0
PALPITATIONS: 0
FEVER: 0
HEADACHES: 0
SPUTUM PRODUCTION: 0
ORTHOPNEA: 0
COUGH: 0
CLAUDICATION: 0
PND: 0
DIZZINESS: 0

## 2025-07-31 ASSESSMENT — FIBROSIS 4 INDEX: FIB4 SCORE: 4.22

## 2025-07-31 NOTE — PROGRESS NOTES
Follow  Up VISIT  [facility] MED[facility]INE CLINIC   2025     Pam Hernandez, ref for eval/mgmt of dyslipidemia, est 2025  Referral Source: Nichole Hill APRN (cardiology)     Subjective    Barriers to care/SDOH: none  Initial visit history: seen with cardiology APRN 2025 after ED eval for CP in 3/2025.  Had negative w/u including EKG, labs.   Recent echo was normal.    Had been on water pills the recent past due to BLE edema that helped but has stopped since.  Denies LANDIS.   Home BP los/70s  Reported at clinic today 110s    Interval history:  Last seen 2025 by Dr Moreno  Doing well  Has started going to gym/exercise classes 3 days a week - weights, bands and bike.  Feeling stronger and has noticed improvement in balance   Family taking her to [place] in sept to celebrate 80th bday!  Did fasting labs - reviewed  Has not scheduled carotid yet  Down 3lbs    CVA: MR 2018 w/o infarcts and repeat  with lacunar nad L cerebellar, occipital infarcts  No TIA/CVA s/s    HLD: currently on zetia and tolerating    HTN: Stable, tolerating meds with good adherence,   Home bps improved, 110-120s/60s  Taking 1/2 tab torsemide    T2D: Stable. Seeing DM clinic. Tolerating meds and no recent med changes.     ANTITHROMBOTIC TX: none  - no hx of bleeding        CKD: - follows with nephrology - has appt in sept  Wants to avoid HD  Still making urine     Problem List[1]   has a past surgical history that includes us-needle core bx-breast panel; cataract extraction with iol (Bilateral); eye surgery (Left, ); vitrectomy posterior (Right, 2018); and baron by laparoscopy (N/A, 10/18/2019).  Medications Ordered Prior to Encounter[2]   Allergies[3]   Family History   Problem Relation Age of Onset    Coronary artery disease Mother      Social History[4]  Review of Systems   Constitutional:  Negative for chills, fever and malaise/fatigue.   Respiratory:  Negative for cough, hemoptysis, sputum production,  "shortness of breath and wheezing.    Cardiovascular:  Negative for chest pain, palpitations, orthopnea, claudication, leg swelling and PND.   Musculoskeletal:  Negative for myalgias.   Neurological:  Negative for dizziness and headaches.         Objective    Vitals:    07/31/25 0937   BP: 116/62   BP Location: Left arm   Patient Position: Sitting   BP Cuff Size: Adult   Pulse: (!) 54   Weight: 67.1 kg (148 lb)   Height: 1.651 m (5' 5\")     BP Readings from Last 5 Encounters:   07/31/25 116/62   06/10/25 120/50   06/05/25 (!) 161/62   04/30/25 136/70   04/22/25 (!) 183/50     BMI Readings from Last 1 Encounters:   07/31/25 24.63 kg/m²     Wt Readings from Last 3 Encounters:   07/31/25 67.1 kg (148 lb)   06/10/25 68.5 kg (151 lb)   06/05/25 68.7 kg (151 lb 6.4 oz)     Physical Exam  Constitutional:       General: She is not in acute distress.     Appearance: Normal appearance. She is not diaphoretic.   HENT:      Head: Normocephalic and atraumatic.   Eyes:      Conjunctiva/sclera: Conjunctivae normal.   Cardiovascular:      Rate and Rhythm: Normal rate and regular rhythm.      Pulses:           Carotid pulses are 2+ on the right side and 2+ on the left side with bruit.       Radial pulses are 2+ on the right side and 2+ on the left side.        Dorsalis pedis pulses are 2+ on the right side and 2+ on the left side.        Posterior tibial pulses are 2+ on the right side and 2+ on the left side.      Heart sounds: Normal heart sounds.   Pulmonary:      Effort: Pulmonary effort is normal.      Breath sounds: Normal breath sounds.   Musculoskeletal:      Right lower leg: No edema.      Left lower leg: No edema.   Skin:     General: Skin is warm and dry.   Neurological:      Mental Status: She is alert and oriented to person, place, and time.      Cranial Nerves: No cranial nerve deficit.      Gait: Gait is intact.   Psychiatric:         Mood and Affect: Mood and affect normal.       DATA REVIEW:  Most Recent Lipid Panel: " "  Lab Results   Component Value Date/Time    CHOLSTRLTOT 161 2025 12:00 AM    LDL 82 2025 12:00 AM    HDL 54 2025 12:00 AM    TRIGLYCERIDE 148 2025 12:00 AM     Lab Results   Component Value Date/Time    LDL 82 2025 12:00 AM     (H) 2022 01:11 AM    LDL 76 2016 03:38 AM      No results found for: \"LIPOPROTA\"   No results found for: \"APOB\"   No results found for: \"CRPHIGHSEN\"      Lab Results   Component Value Date    SODIUM 136 2025    POTASSIUM 5.3 2025    CHLORIDE 107 2025    CO2 18 (L) 2025    ANION 11.0 2025    GLUCOSE 151 (H) 2025    BUN 32 (H) 2025    CREATININE 1.91 (H) 2025    CALCIUM 8.7 2025    ASTSGOT 25 2025    ALTSGPT 11 2025    ALKPHOSPHAT 137 (H) 2025    TBILIRUBIN 0.3 2025    ALBUMIN 3.8 2025    AGRATIO 1.3 2025     Lab Results   Component Value Date/Time    HBA1C 7.1 (A) 06/10/2025 02:34 PM    HBA1C 8.6 (H) 2022 02:00 PM    HBA1C 9.5 (A) 2019 09:15 AM      No results found for: \"MICROALBCALC\", \"MALBCRT\", \"MALBEXCR\", \"FKCQRR00\", \"MICROALBUR\", \"MICRALB\", \"UMICROALBUM\", \"MICROALBTIM\"      Latest Reference Range & Units 09/10/22 09:51   Cortisol 0.0 - 23.0 ug/dL 11.0   TSH 0.380 - 5.330 uIU/mL 4.080     2025 [facility] labs   UACR 952  ApoB 70              IMAGIN CT a/p   Mild calcified plaque within the aorta and branches. Considerable calcified plaque at the origins of the celiac, superior mesenteric, and left renal arteries.      MR brain   1.  No acute abnormality.  2.  A few nonspecific T2 hyperintensities are noted in the subcortical and periventricular white matter.  3.  Mild cerebral and cerebellar volume loss.     MR brain   1.  No evidence of acute territorial infarct, intracranial hemorrhage or mass lesion.  2.  Chronic left paramedian pontine lacunar infarct, new from the previous exam.  3.  Redemonstrated chronic left " cerebellar and left inferior medial occipital lobe infarcts.  4.  Mild diffuse cerebral substance loss.  5.  Mild microangiopathic ischemic change versus demyelination or gliosis.    6/2025 echo   Compared to the prior study on 9/10/2022, no changes.   Normal left ventricular systolic function.   No evidence of valvular abnormality based on Doppler evaluation.       PROCEDURES: none          ASSESSMENT AND PLAN  1. Hypertension, unspecified type  Basic Metabolic Panel      2. Lacunar infarction (HCC)        3. Aortic atherosclerosis (HCC)        4. Atherosclerosis of superior mesenteric artery (HCC)        5. Type 2 diabetes mellitus with diabetic nephropathy, with long-term current use of insulin (HCC)        6. CKD stage G4/A3, GFR 15-29 and albumin creatinine ratio >300 mg/g (HCC)        7. Dyslipidemia        8. History of ischemic stroke without residual deficits        9. Long term (current) use of antithrombotics/antiplatelets            PATIENT TYPE: Secondary Prevention    MAJOR ASCVD:     1) hx of ischemic CVA, lacunar CVA (occurred b/t 2772-2258 per MR imaging, pt reports no prior sx) -   2022 MR brain - L cerebellar, L occipital and L pontine lacunar   - appears to not have had complete w/u to r/o large vessel dz   - indicates mix of athero dz and small vessel CeVD.  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   Plan:  - continue secondary prevention treatment goals, intensive med mgmt and lifestyle interventions   - update carotid duplex, [place]bin in light of slight carotid bruit noted - reminded to schedule, provided contact number  - monitor for new sx     OTHER ESTABLISHED CVD:     1) NON-ANEURYSMAL AORTIC ATHEROSCLEROSIS per CT -  no sx, no prior reported clinical manifestations  or indications of complex features  - general indicator of systemic AS with higher potential AS in other vascular  "beds, possible indicator of higher overall ASCVD risk   Plan:  - no further imaging surveillance, continue med mgmt, consider antiplat tx if concomitant CAD, PAD, or both     2) CEREBROVASCULAR SMALL VESSEL DISEASE (CSVD) with hx of lacunar infarcts -  asymptomatic   - noted per prior neuroimaging, often termed \"chronic microvascular disease\"  - can manifest as mild cognitive dysfunction, dementia, mood disorders, motor and gait dysfunction, and urinary incontinence.   - It is a significant contributor to vascular cognitive impairment and dementia, accounting for a substantial proportion of these cases   - AHA/ASA highlight that CSVD accounts for 20-30% of ischemic strokes, generally as lacunar stroke syndromes and spontaneous ICH  Plan:  - avoid tobacco, control BP, continue ASA therapy   - in general, no specific surveillance needed, unless new focal neuro s/s occur  - seek prompt attention if acute focal neuro s/s      EVIDENCE OF GENETIC DYSLIPIDEMIA: No   FH / LIPIDEMIA genotyping: NO    SECONDARY CAUSES / CONTRIBUTORS: yes  Metabolic:  T2D  Thyroid disease:reported hx of hypothryoidism but not currently on meds   Liver disease: none reported   Renal disease/nephrotic syndrome:  advanced CKD, raises TG baseline  Dietary-induced (ketogenic, lean mass hyper-responder)? no  Medications: None  Alcohol use: N\A    ACC/AHA INDICATION FOR STATIN THERAPY:  Diabetes and Long duration (T2D >10y, T1D >20y), UACR >30, eGFR <60, and Neuropathy    LIPID TARGETS / GOALS   - using lower goals due to high risk T2D  As of 7/2025?  LDL-C <70 mg/dl   MET   apoB <70 mg/dl  MET - more important marker    LIFESTYLE INTERVENTIONS  TOBACCO:    reports that she has never smoked. She has never used smokeless tobacco.   - continued complete avoidance of all tobacco products   PHYSICAL ACTIVITY: >150min/week of mod-intensity activity or as much as tolerated  NUTRITION: reduce Na to <1,500mg/day and Diabetic diet - reduce CHO and kcal  "   ETOH: does not drink  WT MGMT: maintain healthy weight      LIPID-LOWERING MEDICATION MANAGEMENT:     Statin Therapy:   Not currently indicated , consider restart of statin based upon future labs     Non-Statin Therapies:     LDL/apoB therapies:  EZETIMIBE: continue 10mg daily   BEMPEDOIC ACID: not currently indicated   BAS: not currently indicated   PCSK9 mAb: candidate   INCLISIRAN: candidate     TG therapies: not currently indicated , though consider icosapent if TGs >150 in future due to high risk T2D    LP(a) modifying therapy: available in clinical research trials only at this time     PCSK9i strategy indications: Not currently indicated    RECOMMENDED SUPPLEMENTS: None     APHERESIS: n/a     BLOOD PRESSURE MANAGEMENT  - presumed primary contributor is advanced CKD with relative hypervolemia and augmented RAAS, SNS drive  Office BP goal per ACC/AHA <130/80   Home BP at goal: MET  Office BP at goal: MET - prior stage 2 baseline, reports some degree of WHC   24h ABPM:  not ordered to date   RDN candidate? NO, advanced ckd   Contributing factors: advanced CKD   Plan:   - start/continue home BP monitoring  - at initial visit, reviewed correct technique, provided BP log and instructions  - order 24h ABPM:  consider if continued home vs office discrepancies   - routine monitoring of lytes/gfr as needed with med changes   - lifestyle mgmt: as noted and maintain adequate daily hydration >60oz/day to avoid potential for hypovolemia and orthostatic sx   MEDS:  - continue lisinopril 20mg daily  - continue amlodipine 2.5mg daily   - prior stopped furosemide raised her BP in the past   - continue torsemide 5mg qAM for BP mgmt in light of relative hypervolemia from CKD G4  - could consider chlorthalidone 25mg 1/2 tab daily vs 25mg qod as per CLICK trial (2021) for avg 13 pt SBP reduction in CKD G4 pts, would require continued close monitoring of lytes/gfr    GLYCEMIC MANAGEMENT: Diabetic, T2 with DM nephropathy  Goal A1c  "< 7.5 reasonable due to age  - last a1c 6.9 in 4/2025   Lab Results   Component Value Date/Time    HBA1C 7.1 (A) 06/10/2025 02:34 PM    HBA1C 8.6 (H) 09/09/2022 02:00 PM    HBA1C 9.5 (A) 06/17/2019 09:15 AM       No results found for: \"MICROALBCALC\", \"MALBCRT\", \"MALBEXCR\", \"[id number]\", \"MICROALBUR\", \"MICRALB\", \"UMICROALBUM\", \"MICROALBTIM\"  Plan:  - continue current medication plan   - recommmend for routine care with PCP (or endocrine) to include regular A1c monitoring, annual albumin/creatinine ratio (ACR), annual diabetic retinopathy screening, foot exams, annual flu vaccine, and updates to pneumonia vaccines as appropriate      ANTITHROMBOTIC THERAPY: continue ASA 81mg daily due to hx of CVA     OTHER:    # CKD G4/A3, stable, presumed d/t T2D   Making urine, wishes to avoid HD if possible, follows with nephrology regularly   Plan:  - avoid nephrotoxins, maintain adequate hydration   - continue HTN control with RAAS blockade   - monitor lytes/gfr routinely  - ongoing care with nephrology for lytes/gfr mgmt, prep for renal replacement tx if needed     STUDIES:  Now - carotid duplex - reminded to schedule   FOLLOW-UP: 4 months        GERONIMO Dickerson.  [facility]   [facility]   [phone]              [1]   Patient Active Problem List  Diagnosis    Other chest pain    Type 2 diabetes mellitus with hyperglycemia, with long-term current use of insulin (HCC)    RUQ pain    Elevated TSH    Hypothyroidism    BMI 27.0-27.9,adult    GERD (gastroesophageal reflux disease)    Glaucoma    Peripheral vascular disease, unspecified (HCC)    Sinus bradycardia    Hypertension associated with type 2 diabetes mellitus (HCC)    Diastolic dysfunction    Dizziness    Neuropathy    Urinary incontinence    Low serum vitamin B12    Neuropathy due to type 2 diabetes mellitus (HCC)    Diabetic retinopathy (HCC)    Hearing loss    Osteoarthrosis    Osteopenia    Dyslipidemia associated " with type 2 diabetes mellitus (HCC)    Hypertensive heart and chronic kidney disease with heart failure and stage 1 through stage 4 chronic kidney disease, or unspecified chronic kidney disease (HCC)    Long term (current) use of antithrombotics/antiplatelets    CKD stage G4/A3, GFR 15-29 and albumin creatinine ratio >300 mg/g (HCC)    Lacunar infarction (HCC)    History of CVA (cerebrovascular accident)    Aortic atherosclerosis (HCC)    Atherosclerosis of superior mesenteric artery (HCC)    Secondary hyperparathyroidism of renal origin (HCC)    Vitamin D deficiency    Acquired cerebral atrophy (HCC)    Secondary aldosteronism (HCC)   [2]   Current Outpatient Medications on File Prior to Visit   Medication Sig Dispense Refill    torsemide (DEMADEX) 5 MG Tab Take 1 Tablet by mouth every morning. to lower blood pressure 100 Tablet 3    FARXIGA 10 MG Tab Take 1 Tablet by mouth every day. 100 Tablet 3    amLODIPine (NORVASC) 2.5 MG Tab Take 1 Tablet by mouth every day. 100 Tablet 3    lisinopril (PRINIVIL) 20 MG Tab Take 20 mg by mouth every day.      ezetimibe (ZETIA) 10 MG Tab Take 1 Tablet by mouth every day. 90 Tablet 1    ASPIRIN LOW DOSE 81 MG EC tablet every day.      Continuous Blood Gluc Sensor (FREESTYLE ADAL 2 SENSOR) Misc       LANTUS SOLOSTAR 100 UNIT/ML Solution Pen-injector injection Inject 5 Units under the skin every evening.      acetaminophen (TYLENOL) 325 MG Tab       diclofenac sodium (VOLTAREN) 1 % Gel       TRUE METRIX BLOOD GLUCOSE TEST strip       DROPLET PEN NEEDLES       Insulin Pen Needle 32 G x 4 mm Use to inject insulin daily as directed 100 Each 0    Syringe (BD PLASTIPAK SYRINGE) 3 ML Misc use to draw B12 injection use as directed by MD 11 Each 3    Carboxymethylcellulose Sod PF 0.5 % Solution Administer 1 Drop into both eyes every 2 hours while awake.      Brimonidine Tartrate-Timolol 0.2-0.5 % Solution Administer 1 Drop into both eyes 2 times a day.      gabapentin (NEURONTIN) 100 MG  Cap Take 300 mg by mouth every day.      latanoprost (XALATAN) 0.005 % Solution Place 1 Drop in both eyes every evening.       No current facility-administered medications on file prior to visit.   [3]   Allergies  Allergen Reactions    Epinephrine      Other reaction(s): Dizziness    Nalbuphine      Other reaction(s): Depression    Novocain Anaphylaxis    Pseudoephedrine      Caused depression.  Other reaction(s): Depression    Lasix [Furosemide] Unspecified     Elevated BP - per pt     Levofloxacin      Other reaction(s): Pruritic rash   [4]   Social History  Tobacco Use    Smoking status: Never    Smokeless tobacco: Never   Vaping Use    Vaping status: Never Used   Substance Use Topics    Alcohol use: No    Drug use: No

## 2025-08-12 ENCOUNTER — TELEPHONE (OUTPATIENT)
Dept: VASCULAR LAB | Facility: MEDICAL CENTER | Age: 80
End: 2025-08-12
Payer: MEDICARE

## 2025-08-18 ENCOUNTER — NON-PROVIDER VISIT (OUTPATIENT)
Dept: CARDIOLOGY | Facility: MEDICAL CENTER | Age: 80
End: 2025-08-18
Attending: NURSE PRACTITIONER
Payer: MEDICARE

## 2025-08-18 VITALS
HEART RATE: 55 BPM | SYSTOLIC BLOOD PRESSURE: 124 MMHG | WEIGHT: 147.4 LBS | DIASTOLIC BLOOD PRESSURE: 46 MMHG | BODY MASS INDEX: 24.53 KG/M2

## 2025-08-18 DIAGNOSIS — R79.89 ELEVATED BRAIN NATRIURETIC PEPTIDE (BNP) LEVEL: ICD-10-CM

## 2025-08-18 DIAGNOSIS — I10 ESSENTIAL HYPERTENSION, BENIGN: ICD-10-CM

## 2025-08-18 DIAGNOSIS — N18.4 CKD STAGE G4/A3, GFR 15-29 AND ALBUMIN CREATININE RATIO >300 MG/G (HCC): Chronic | ICD-10-CM

## 2025-08-18 DIAGNOSIS — Z79.4 TYPE 2 DIABETES MELLITUS WITH DIABETIC NEUROPATHY, WITH LONG-TERM CURRENT USE OF INSULIN (HCC): ICD-10-CM

## 2025-08-18 DIAGNOSIS — E78.5 DYSLIPIDEMIA: ICD-10-CM

## 2025-08-18 DIAGNOSIS — E11.40 TYPE 2 DIABETES MELLITUS WITH DIABETIC NEUROPATHY, WITH LONG-TERM CURRENT USE OF INSULIN (HCC): ICD-10-CM

## 2025-08-18 DIAGNOSIS — N18.4 CHRONIC KIDNEY DISEASE (CKD), STAGE IV (SEVERE) (HCC): ICD-10-CM

## 2025-08-18 DIAGNOSIS — I10 HYPERTENSION, UNSPECIFIED TYPE: ICD-10-CM

## 2025-08-18 DIAGNOSIS — Z79.899 HIGH RISK MEDICATION USE: ICD-10-CM

## 2025-08-18 PROCEDURE — 99214 OFFICE O/P EST MOD 30 MIN: CPT

## 2025-08-18 RX ORDER — AMLODIPINE BESYLATE 2.5 MG/1
2.5 TABLET ORAL DAILY
Qty: 100 TABLET | Refills: 3 | Status: SHIPPED | OUTPATIENT
Start: 2025-08-18 | End: 2026-09-22

## 2025-08-18 ASSESSMENT — FIBROSIS 4 INDEX: FIB4 SCORE: 4.28

## (undated) DEVICE — SYRINGE SAFETY 10 ML 18 GA X 1 1/2 BLUNT LL (100/BX 4BX/CA)

## (undated) DEVICE — TROCAR 5X100 BLADED Z-THREAD - KII (6/BX)

## (undated) DEVICE — NEPTUNE 4 PORT MANIFOLD - (20/PK)

## (undated) DEVICE — KIT ANESTHESIA W/CIRCUIT & 3/LT BAG W/FILTER (20EA/CA)

## (undated) DEVICE — CANNULA W/SEAL 5X100 Z-THRE - ADED KII (12/BX)

## (undated) DEVICE — SENSOR SPO2 NEO LNCS ADHESIVE (20/BX) SEE USER NOTES

## (undated) DEVICE — NEEDLE FILTER ASPIRATION 18 GA X 1 1/2 IN (100EA/BX)

## (undated) DEVICE — MANIFOLD NEPTUNE 1 PORT (20/PK)

## (undated) DEVICE — GLOVE BIOGEL SZ 7.5 SURGICAL PF LTX - (50PR/BX 4BX/CA)

## (undated) DEVICE — SUCTION INSTRUMENT YANKAUER BULBOUS TIP W/O VENT (50EA/CA)

## (undated) DEVICE — GLOVE BIOGEL INDICATOR SZ 6.5 SURGICAL PF LTX - (50PR/BX 4BX/CA)

## (undated) DEVICE — BAG RETRIEVAL 10ML (10EA/BX)

## (undated) DEVICE — MASK ANESTHESIA ADULT  - (100/CA)

## (undated) DEVICE — WATER IRRIGATION STERILE 1000ML (12EA/CA)

## (undated) DEVICE — SET LEADWIRE 5 LEAD BEDSIDE DISPOSABLE ECG (1SET OF 5/EA)

## (undated) DEVICE — BLADE SURGICAL CLIPPER - (50EA/CA)

## (undated) DEVICE — TUBING CLEARLINK DUO-VENT - C-FLO (48EA/CA)

## (undated) DEVICE — ELECTRODE 850 FOAM ADHESIVE - HYDROGEL RADIOTRNSPRNT (50/PK)

## (undated) DEVICE — LACTATED RINGERS INJ 1000 ML - (14EA/CA 60CA/PF)

## (undated) DEVICE — HEAD HOLDER JUNIOR/ADULT

## (undated) DEVICE — GLOVE BIOGEL SZ 8 SURGICAL PF LTX - (50PR/BX 4BX/CA)

## (undated) DEVICE — CATHETER IV 20 GA X 1-1/4 ---SURG.& SDS ONLY--- (50EA/BX)

## (undated) DEVICE — CANISTER SUCTION 3000ML MECHANICAL FILTER AUTO SHUTOFF MEDI-VAC NONSTERILE LF DISP  (40EA/CA)

## (undated) DEVICE — SET SUCTION/IRRIGATION WITH DISPOSABLE TIP (6/CA )PART #0250-070-520 IS A SUB

## (undated) DEVICE — CHLORAPREP 26 ML APPLICATOR - ORANGE TINT(25/CA)

## (undated) DEVICE — PROBE 23 GA ILLUM FLEX CURVED - LASER(6/BX)

## (undated) DEVICE — ELECTRODE DUAL RETURN W/ CORD - (50/PK)

## (undated) DEVICE — SUTURE GENERAL

## (undated) DEVICE — NEEDLE INSUFFLATION FOR STEP - (12/BX)

## (undated) DEVICE — SUTURE 4-0 MONOCRYL PLUS PS-2 - 27 INCH (36/BX)

## (undated) DEVICE — TUBE NG SALEM SUMP 16FR (50EA/CA)

## (undated) DEVICE — SET I.V. ADMIN W/ULTRASITE 106IN APPX 13.2ML 15 DROP PRIMARY W/BCV-CLAVE ROTAT LR (50/CA)30 DAY LEAD

## (undated) DEVICE — CANISTER SUCTION RIGID RED 1500CC (40EA/CA)

## (undated) DEVICE — CANNULA INJECTION 27G (EYE) - 10/BX ALCON

## (undated) DEVICE — TROCAR Z THREAD 11 X 100 - BLADED (6/BX)

## (undated) DEVICE — DERMABOND ADVANCED - (12EA/BX)

## (undated) DEVICE — CORDS BIPOLAR COAGULATION - 12FT STERILE DISP. (10EA/BX)

## (undated) DEVICE — PACK LAP CHOLE OR - (2EA/CA)

## (undated) DEVICE — GOWN SURGEONS X-LARGE - DISP. (30/CA)

## (undated) DEVICE — PROTECTOR ULNA NERVE - (36PR/CA)

## (undated) DEVICE — KIT  I.V. START (100EA/CA)

## (undated) DEVICE — GLOVE BIOGEL SZ 8.5 SURGICAL PF LTX - (50PR/BX 4BX/CA)

## (undated) DEVICE — CLIP MED LG INTNL HRZN TI ESCP - (20/BX)

## (undated) DEVICE — TUBE CONNECTING SUCTION - CLEAR PLASTIC STERILE 72 IN (50EA/CA)

## (undated) DEVICE — SUTURE EYE

## (undated) DEVICE — SLEEVE, VASO, THIGH, MED

## (undated) DEVICE — SHIELD OPTH AL GRTR CVR FOX (50EA/BX)

## (undated) DEVICE — SODIUM CHL IRRIGATION 0.9% 1000ML (12EA/CA)

## (undated) DEVICE — GOWN SURGEONS LARGE - (32/CA)

## (undated) DEVICE — FORCEP ILM 23GA DISP REVOLUT. - (6/BX)

## (undated) DEVICE — PAD EYE GAUZE COVERED OVAL 1 5/8 X 2 5/8" STERILE"

## (undated) DEVICE — SET EXTENSION WITH 2 PORTS (48EA/CA) ***PART #2C8610 IS A SUBSTITUTE*****

## (undated) DEVICE — CANNULA DIVIDED ADULT CO2 - SAMPLE W/FEMALE CONNCT (25/CA)